# Patient Record
Sex: FEMALE | Race: WHITE | Employment: OTHER | URBAN - METROPOLITAN AREA
[De-identification: names, ages, dates, MRNs, and addresses within clinical notes are randomized per-mention and may not be internally consistent; named-entity substitution may affect disease eponyms.]

---

## 2023-06-01 ENCOUNTER — OFFICE VISIT (OUTPATIENT)
Age: 68
End: 2023-06-01
Payer: COMMERCIAL

## 2023-06-01 VITALS
TEMPERATURE: 97.1 F | HEART RATE: 71 BPM | OXYGEN SATURATION: 96 % | SYSTOLIC BLOOD PRESSURE: 130 MMHG | HEIGHT: 64 IN | WEIGHT: 191 LBS | DIASTOLIC BLOOD PRESSURE: 60 MMHG | RESPIRATION RATE: 14 BRPM | BODY MASS INDEX: 32.61 KG/M2

## 2023-06-01 DIAGNOSIS — Z86.010 HISTORY OF COLONIC POLYPS: ICD-10-CM

## 2023-06-01 DIAGNOSIS — R10.30 LOWER ABDOMINAL PAIN: ICD-10-CM

## 2023-06-01 DIAGNOSIS — D50.0 IRON DEFICIENCY ANEMIA DUE TO CHRONIC BLOOD LOSS: ICD-10-CM

## 2023-06-01 DIAGNOSIS — E07.9 THYROID DISORDER: ICD-10-CM

## 2023-06-01 DIAGNOSIS — Z86.010 HISTORY OF COLONIC POLYPS: Primary | ICD-10-CM

## 2023-06-01 DIAGNOSIS — K92.1 BLOOD IN STOOL: ICD-10-CM

## 2023-06-01 DIAGNOSIS — R10.30 LOWER ABDOMINAL PAIN: Primary | ICD-10-CM

## 2023-06-01 DIAGNOSIS — R19.7 DIARRHEA, UNSPECIFIED TYPE: ICD-10-CM

## 2023-06-01 PROBLEM — D64.9 ANEMIA: Status: ACTIVE | Noted: 2023-06-01

## 2023-06-01 PROBLEM — Z86.0100 HISTORY OF COLONIC POLYPS: Status: ACTIVE | Noted: 2023-03-08

## 2023-06-01 PROCEDURE — 99204 OFFICE O/P NEW MOD 45 MIN: CPT | Performed by: INTERNAL MEDICINE

## 2023-06-01 PROCEDURE — 1123F ACP DISCUSS/DSCN MKR DOCD: CPT | Performed by: INTERNAL MEDICINE

## 2023-06-01 RX ORDER — ASPIRIN 81 MG/1
81 TABLET ORAL DAILY
COMMUNITY

## 2023-06-01 RX ORDER — SERTRALINE HYDROCHLORIDE 100 MG/1
200 TABLET, FILM COATED ORAL EVERY MORNING
COMMUNITY
Start: 2023-04-03 | End: 2023-06-01

## 2023-06-01 RX ORDER — FUROSEMIDE 20 MG/1
20 TABLET ORAL 2 TIMES DAILY
COMMUNITY

## 2023-06-01 RX ORDER — POLYETHYLENE GLYCOL 3350 17 G/17G
POWDER, FOR SOLUTION ORAL
Qty: 510 G | Refills: 0 | Status: SHIPPED | OUTPATIENT
Start: 2023-06-01

## 2023-06-01 RX ORDER — ERGOCALCIFEROL 1.25 MG/1
CAPSULE ORAL
COMMUNITY
Start: 2023-04-13

## 2023-06-01 RX ORDER — LOPERAMIDE HCL 2 MG
2 TABLET ORAL PRN
COMMUNITY
Start: 2023-03-22

## 2023-06-01 RX ORDER — HYDROCODONE BITARTRATE AND ACETAMINOPHEN 10; 325 MG/1; MG/1
TABLET ORAL
COMMUNITY
Start: 2023-03-10 | End: 2023-06-01

## 2023-06-01 RX ORDER — LEVOTHYROXINE SODIUM 0.15 MG/1
150 TABLET ORAL
COMMUNITY
Start: 2023-04-03

## 2023-06-01 RX ORDER — DICYCLOMINE HYDROCHLORIDE 10 MG/1
10 CAPSULE ORAL 3 TIMES DAILY PRN
Qty: 90 CAPSULE | Refills: 3 | Status: SHIPPED | OUTPATIENT
Start: 2023-06-01

## 2023-06-01 RX ORDER — TRAZODONE HYDROCHLORIDE 100 MG/1
200 TABLET ORAL NIGHTLY
COMMUNITY
Start: 2023-05-15

## 2023-06-01 RX ORDER — BUSPIRONE HYDROCHLORIDE 15 MG/1
15 TABLET ORAL 2 TIMES DAILY
COMMUNITY
Start: 2023-05-22

## 2023-06-01 RX ORDER — ATORVASTATIN CALCIUM 40 MG/1
40 TABLET, FILM COATED ORAL DAILY
COMMUNITY

## 2023-06-01 RX ORDER — DONEPEZIL HYDROCHLORIDE 10 MG/1
TABLET, FILM COATED ORAL
COMMUNITY
Start: 2023-03-20

## 2023-06-01 RX ORDER — POTASSIUM CHLORIDE 750 MG/1
TABLET, FILM COATED, EXTENDED RELEASE ORAL
COMMUNITY
Start: 2023-05-31

## 2023-06-01 RX ORDER — MIRABEGRON 50 MG/1
TABLET, FILM COATED, EXTENDED RELEASE ORAL
COMMUNITY
Start: 2023-05-31

## 2023-06-01 RX ORDER — ATORVASTATIN CALCIUM 20 MG/1
20 TABLET, FILM COATED ORAL
COMMUNITY
End: 2023-06-01

## 2023-06-01 ASSESSMENT — PATIENT HEALTH QUESTIONNAIRE - PHQ9
1. LITTLE INTEREST OR PLEASURE IN DOING THINGS: 1
2. FEELING DOWN, DEPRESSED OR HOPELESS: 0
SUM OF ALL RESPONSES TO PHQ QUESTIONS 1-9: 1
SUM OF ALL RESPONSES TO PHQ QUESTIONS 1-9: 1
SUM OF ALL RESPONSES TO PHQ9 QUESTIONS 1 & 2: 1
SUM OF ALL RESPONSES TO PHQ QUESTIONS 1-9: 1
SUM OF ALL RESPONSES TO PHQ QUESTIONS 1-9: 1

## 2023-06-01 ASSESSMENT — ENCOUNTER SYMPTOMS
RECTAL PAIN: 0
CONSTIPATION: 0
ABDOMINAL DISTENTION: 1
ANAL BLEEDING: 1
VOMITING: 1
BLOOD IN STOOL: 1
DIARRHEA: 1
ABDOMINAL PAIN: 1
ALLERGIC/IMMUNOLOGIC NEGATIVE: 1
NAUSEA: 1

## 2023-06-02 PROBLEM — K92.1 BLOOD IN STOOL: Status: ACTIVE | Noted: 2023-06-02

## 2023-06-02 NOTE — PROGRESS NOTES
1. Have you been to the ER, urgent care clinic since your last visit? Hospitalized since your last visit? no    2. Have you seen or consulted any other health care providers outside of the [address] [place] [address] since your last visit? Include any pap smears or colon screening.  No    Chief Complaint   Patient presents with    New Patient    Melena     Black and tarry stools   /60 (Site: Left Upper Arm, Position: Sitting, Cuff Size: Large Adult)   Pulse 71   Temp 97.1 °F (36.2 °C) (Temporal)   Resp 14   Ht 5' 4\" (1.626 m)   Wt 191 lb (86.6 kg)   SpO2 96%   BMI 32.79 kg/m²
Patient and  was given [employer] stool kit to collect specimen at home. Colonoscopy is scheduled for 6-8-2023. NO PA REQUIRED PER [facility]. 6-2-2023.
back surgery    TONSILLECTOMY N/A        Family History   Problem Relation Age of Onset    Heart Disease Paternal Grandfather     Diabetes Other     Hypertension Other     Stroke Other        Social History     Socioeconomic History    Marital status:      Spouse name: Not on file    Number of children: Not on file    Years of education: Not on file    Highest education level: Not on file   Occupational History    Not on file   Tobacco Use    Smoking status: Every Day     Types: Cigarettes    Smokeless tobacco: Never   Vaping Use    Vaping Use: Never used   Substance and Sexual Activity    Alcohol use: Never    Drug use: Never    Sexual activity: Not on file   Other Topics Concern    Not on file   Social History Narrative    Not on file     Social Determinants of Health     Financial Resource Strain: Not on file   Food Insecurity: Not on file   Transportation Needs: Not on file   Physical Activity: Not on file   Stress: Not on file   Social Connections: Not on file   Intimate Partner Violence: Not on file   Housing Stability: Not on file         HPI  63-year-old female with history of hypertension, hyperlipidemia, anxiety disorder, legally blind, COPD, status post CVA, dementia, hypothyroidism, polysubstance abuse, history of suicide attempts, and depression who comes in for evaluation. Colonoscopy on 3/8/2023 at [address] [place] [address] for multiple polyps were noted throughout the colon which were not removed. The procedure was done by a surgeon there. The reason for the colonoscopy was anemia, diarrhea, and lower abdominal pain. Patient was sent to see GI later on 3/26/2023 who decided to hold off on a colonoscopy and removal polyps until her abdominal pain resolves. She was to have follow-up in 1 month. She states she is has constant diarrhea all day eating causes her to have a bowel movement. The abdominal pain is across the lower abdomen.   She has seen a urologist who did a cystoscopy and told her everything

## 2023-06-08 ENCOUNTER — HOSPITAL ENCOUNTER (OUTPATIENT)
Facility: HOSPITAL | Age: 68
Setting detail: OUTPATIENT SURGERY
Discharge: HOME | End: 2023-06-08
Attending: INTERNAL MEDICINE | Admitting: INTERNAL MEDICINE
Payer: COMMERCIAL

## 2023-06-08 ENCOUNTER — ANESTHESIA EVENT (OUTPATIENT)
Facility: HOSPITAL | Age: 68
End: 2023-06-08
Payer: COMMERCIAL

## 2023-06-08 ENCOUNTER — ANESTHESIA (OUTPATIENT)
Facility: HOSPITAL | Age: 68
End: 2023-06-08
Payer: COMMERCIAL

## 2023-06-08 ENCOUNTER — TELEPHONE (OUTPATIENT)
Age: 68
End: 2023-06-08

## 2023-06-08 VITALS
OXYGEN SATURATION: 94 % | HEIGHT: 64 IN | RESPIRATION RATE: 18 BRPM | TEMPERATURE: 97.3 F | WEIGHT: 181 LBS | DIASTOLIC BLOOD PRESSURE: 59 MMHG | SYSTOLIC BLOOD PRESSURE: 134 MMHG | HEART RATE: 75 BPM | BODY MASS INDEX: 30.9 KG/M2

## 2023-06-08 DIAGNOSIS — K57.90 DIVERTICULAR DISEASE: Primary | ICD-10-CM

## 2023-06-08 PROCEDURE — 3700000000 HC ANESTHESIA ATTENDED CARE: Performed by: INTERNAL MEDICINE

## 2023-06-08 PROCEDURE — 2709999900 HC NON-CHARGEABLE SUPPLY: Performed by: INTERNAL MEDICINE

## 2023-06-08 PROCEDURE — 6360000002 HC RX W HCPCS: Performed by: NURSE ANESTHETIST, CERTIFIED REGISTERED

## 2023-06-08 PROCEDURE — 3600007502: Performed by: INTERNAL MEDICINE

## 2023-06-08 PROCEDURE — 3600007512: Performed by: INTERNAL MEDICINE

## 2023-06-08 PROCEDURE — 3700000001 HC ADD 15 MINUTES (ANESTHESIA): Performed by: INTERNAL MEDICINE

## 2023-06-08 PROCEDURE — 7100000010 HC PHASE II RECOVERY - FIRST 15 MIN: Performed by: INTERNAL MEDICINE

## 2023-06-08 PROCEDURE — 7100000011 HC PHASE II RECOVERY - ADDTL 15 MIN: Performed by: INTERNAL MEDICINE

## 2023-06-08 PROCEDURE — 2580000003 HC RX 258: Performed by: INTERNAL MEDICINE

## 2023-06-08 RX ORDER — SODIUM CHLORIDE 9 MG/ML
25 INJECTION, SOLUTION INTRAVENOUS PRN
Status: DISCONTINUED | OUTPATIENT
Start: 2023-06-08 | End: 2023-06-08 | Stop reason: HOSPADM

## 2023-06-08 RX ORDER — FENTANYL CITRATE 50 UG/ML
INJECTION, SOLUTION INTRAMUSCULAR; INTRAVENOUS PRN
Status: DISCONTINUED | OUTPATIENT
Start: 2023-06-08 | End: 2023-06-08 | Stop reason: SDUPTHER

## 2023-06-08 RX ORDER — PROPOFOL 10 MG/ML
INJECTION, EMULSION INTRAVENOUS PRN
Status: DISCONTINUED | OUTPATIENT
Start: 2023-06-08 | End: 2023-06-08 | Stop reason: SDUPTHER

## 2023-06-08 RX ORDER — METRONIDAZOLE 500 MG/1
500 TABLET ORAL 3 TIMES DAILY
Qty: 42 TABLET | Refills: 0 | Status: SHIPPED | OUTPATIENT
Start: 2023-06-08

## 2023-06-08 RX ADMIN — PROPOFOL 50 MG: 10 INJECTION, EMULSION INTRAVENOUS at 14:24

## 2023-06-08 RX ADMIN — PROPOFOL 100 MG: 10 INJECTION, EMULSION INTRAVENOUS at 15:40

## 2023-06-08 RX ADMIN — PROPOFOL 75 MG: 10 INJECTION, EMULSION INTRAVENOUS at 14:38

## 2023-06-08 RX ADMIN — PROPOFOL 100 MG: 10 INJECTION, EMULSION INTRAVENOUS at 15:51

## 2023-06-08 RX ADMIN — PROPOFOL 50 MG: 10 INJECTION, EMULSION INTRAVENOUS at 16:23

## 2023-06-08 RX ADMIN — PROPOFOL 50 MG: 10 INJECTION, EMULSION INTRAVENOUS at 15:59

## 2023-06-08 RX ADMIN — PROPOFOL 50 MG: 10 INJECTION, EMULSION INTRAVENOUS at 16:10

## 2023-06-08 RX ADMIN — PROPOFOL 50 MG: 10 INJECTION, EMULSION INTRAVENOUS at 16:29

## 2023-06-08 RX ADMIN — PROPOFOL 50 MG: 10 INJECTION, EMULSION INTRAVENOUS at 15:33

## 2023-06-08 RX ADMIN — MIDAZOLAM 2 MG: 1 INJECTION INTRAMUSCULAR; INTRAVENOUS at 14:14

## 2023-06-08 RX ADMIN — PROPOFOL 50 MG: 10 INJECTION, EMULSION INTRAVENOUS at 14:15

## 2023-06-08 RX ADMIN — PROPOFOL 100 MG: 10 INJECTION, EMULSION INTRAVENOUS at 14:58

## 2023-06-08 RX ADMIN — PROPOFOL 100 MG: 10 INJECTION, EMULSION INTRAVENOUS at 15:45

## 2023-06-08 RX ADMIN — FENTANYL CITRATE 100 MCG: 50 INJECTION, SOLUTION INTRAMUSCULAR; INTRAVENOUS at 16:41

## 2023-06-08 RX ADMIN — PROPOFOL 50 MG: 10 INJECTION, EMULSION INTRAVENOUS at 15:25

## 2023-06-08 RX ADMIN — PROPOFOL 100 MG: 10 INJECTION, EMULSION INTRAVENOUS at 15:09

## 2023-06-08 RX ADMIN — PROPOFOL 50 MG: 10 INJECTION, EMULSION INTRAVENOUS at 14:32

## 2023-06-08 RX ADMIN — PROPOFOL 75 MG: 10 INJECTION, EMULSION INTRAVENOUS at 14:46

## 2023-06-08 ASSESSMENT — PAIN - FUNCTIONAL ASSESSMENT
PAIN_FUNCTIONAL_ASSESSMENT: NONE - DENIES PAIN
PAIN_FUNCTIONAL_ASSESSMENT: 0-10

## 2023-06-08 NOTE — OP NOTE
cm in length by 0.5 cm. It was removed via multiple cold biopsies. In the descending colon a polyp measuring 0 1.2 cm was removed via hot snare polypectomy. In the sigmoid colon polyp which measured 1.5 cm was found which could not be removed due to technical difficulties. In the rectum a polyp measuring 0.4 cm x 3 was removed via cold biopsies. Patient had diverticulosis involving the sigmoid and descending colon. It should be noted that the gum-like material was noted and small pieces throughout the colon because of biopsy as well as snares to get stuck in the scope after the initial use. Second colonoscope was used with all new forceps and it also stuck through the channel after initial use. We will have to discuss with patient since the sigmoid colon polyp could not be removed secondary to that problem. It did not prevent us from reaching the cecum.         ENDOSCOPIST: Jaylen Velasco MD     ENDOSCOPE: Olympus video colonoscope     ASSISTANT:Circulator: Yocasta Sierra RN              Scrub Person First: Mahsa Harden     ANESTHESIA: TIVA      QUALITY OF PREPARATION:Poor      FINDINGS:   Ascending colon polyps  Transverse colon polyp  Sigmoid colon polyp  Rectal polyps  Left-sided diverticulosis      Complication:  None           EBL: Minimal     SPECIMENS:   ID Type Source Tests Collected by Time Destination   A : ascending colon polyp Tissue Colon-Ascending SURGICAL PATHOLOGY [place]., MD 6/8/2023 1448    B : descending colon polyp Tissue Colon-Descending SURGICAL PATHOLOGY [place] [facility]., MD 6/8/2023 1509    C : rectal polyp Tissue Rectum SURGICAL PATHOLOGY [place]., MD 6/8/2023 1519              [place], MD  June 8, 2023  4:43 PM

## 2023-06-08 NOTE — INTERVAL H&P NOTE
Update History & Physical    The patient's History and Physical of June 8, 2023 was reviewed with the patient and I examined the patient. There was no change. The surgical site was confirmed by the patient and me. Plan: The risks, benefits, expected outcome, and alternative to the recommended procedure have been discussed with the patient. Patient understands and wants to proceed with the procedure.      Electronically signed by Carmen Birmingham MD on 6/8/2023 at 10:34 AM

## 2023-06-08 NOTE — ANESTHESIA PRE PROCEDURE
Department of Anesthesiology  Preprocedure Note       Name:  Venkat Polo   Age:  79 y.o.  :  1955                                          MRN:  [id number]         Date:  2023      Surgeon: Luis Miguel Carrasquillo):  Wing Arabella MD    Procedure: Procedure(s):  COLONOSCOPY    Medications prior to admission:   Prior to Admission medications    Medication Sig Start Date End Date Taking? Authorizing Provider   busPIRone (BUSPAR) 15 MG tablet Take 15 mg by mouth 2 times daily 23   Historical Provider, MD   diclofenac sodium (VOLTAREN) 1 % GEL 2 g 4 times daily as needed 23   Historical Provider, MD   donepezil (ARICEPT) 10 MG tablet TAKE 1 TABLET BY MOUTH EVERYDAY AT BEDTIME 3/20/23   Historical Provider, MD   vitamin D (ERGOCALCIFEROL) 1.25 MG (60621 UT) CAPS capsule TAKE 1 CAPSULE BY MOUTH 1 TIME PER WEEK. 23   Historical Provider, MD   levothyroxine (SYNTHROID) 150 MCG tablet Take 1 tablet by mouth every morning (before breakfast) 4/3/23   Historical Provider, MD   [employer] ANTI-DIARRHEAL 2 MG tablet 1 tablet as needed 3/22/23   Historical Provider, MD   metoprolol tartrate (LOPRESSOR) 25 MG tablet Take 1 tablet by mouth 2 times daily 23   Historical Provider, MD   MYRBETRIQ 50 MG TB24  23   Historical Provider, MD   potassium chloride (KLOR-CON) 10 MEQ extended release tablet  23   Historical Provider, MD   traZODone (DESYREL) 100 MG tablet Take 2 tablets by mouth nightly 5/15/23   Historical Provider, MD   aspirin 81 MG EC tablet Take 1 tablet by mouth daily    Historical Provider, MD   furosemide (LASIX) 20 MG tablet Take 1 tablet by mouth 2 times daily  Patient not taking: Reported on 2023    Historical Provider, MD   atorvastatin (LIPITOR) 40 MG tablet Take 1 tablet by mouth daily    Historical Provider, MD   polyethylene glycol (GLYCOLAX) 17 GM/SCOOP powder Use as directed by physician.  23   Jh Rico MD   dicyclomine (BENTYL) 10 MG capsule Take 1

## 2023-06-08 NOTE — ANESTHESIA PRE PROCEDURE
Department of Anesthesiology  Preprocedure Note       Name:  Lilian Burn   Age:  79 y.o.  :  1955                                          MRN:  [id number]         Date:  2023      Surgeon: Robinson Nguyen):  Danelle Mesa MD    Procedure: Procedure(s):  COLONOSCOPY    Medications prior to admission:   Prior to Admission medications    Medication Sig Start Date End Date Taking? Authorizing Provider   busPIRone (BUSPAR) 15 MG tablet Take 15 mg by mouth 2 times daily 23   Historical Provider, MD   diclofenac sodium (VOLTAREN) 1 % GEL 2 g 4 times daily as needed 23   Historical Provider, MD   donepezil (ARICEPT) 10 MG tablet TAKE 1 TABLET BY MOUTH EVERYDAY AT BEDTIME 3/20/23   Historical Provider, MD   vitamin D (ERGOCALCIFEROL) 1.25 MG (82365 UT) CAPS capsule TAKE 1 CAPSULE BY MOUTH 1 TIME PER WEEK. 23   Historical Provider, MD   levothyroxine (SYNTHROID) 150 MCG tablet Take 1 tablet by mouth every morning (before breakfast) 4/3/23   Historical Provider, MD   [employer]S ANTI-DIARRHEAL 2 MG tablet 1 tablet as needed 3/22/23   Historical Provider, MD   metoprolol tartrate (LOPRESSOR) 25 MG tablet Take 1 tablet by mouth 2 times daily 23   Historical Provider, MD   MYRBETRIQ 50 MG TB24  23   Historical Provider, MD   potassium chloride (KLOR-CON) 10 MEQ extended release tablet  23   Historical Provider, MD   traZODone (DESYREL) 100 MG tablet Take 2 tablets by mouth nightly 5/15/23   Historical Provider, MD   aspirin 81 MG EC tablet Take 1 tablet by mouth daily    Historical Provider, MD   furosemide (LASIX) 20 MG tablet Take 1 tablet by mouth 2 times daily  Patient not taking: Reported on 2023    Historical Provider, MD   atorvastatin (LIPITOR) 40 MG tablet Take 1 tablet by mouth daily    Historical Provider, MD   polyethylene glycol (GLYCOLAX) 17 GM/SCOOP powder Use as directed by physician.  23   Jh Hurt MD   dicyclomine (BENTYL) 10 MG capsule Take 1

## 2023-06-08 NOTE — ANESTHESIA POSTPROCEDURE EVALUATION
Department of Anesthesiology  Postprocedure Note    Patient: Cristine Ortiz  MRN: [id number]  YOB: 1955  Date of evaluation: 6/8/2023      Procedure Summary     Date: 06/08/23 Room / Location: [facility] ENDO 01 / [facility] ENDOSCOPY    Anesthesia Start: 1414 Anesthesia Stop:     Procedure: COLONOSCOPY (Anus) Diagnosis:       Hx of colonic polyps      Lower abdominal pain      Diarrhea, unspecified type      (Hx of colonic polyps [Z86.010])      (Lower abdominal pain [R10.30])      (Diarrhea, unspecified type [R19.7])    Surgeons:  Rancho Yepez MD Responsible Provider: JILLIAN Brizuela CRNA    Anesthesia Type: MAC ASA Status: 3          Anesthesia Type: MAC    Sowmya Phase I: Sowmya Score: 10    Sowmya Phase II:        Anesthesia Post Evaluation    Patient location during evaluation: PACU  Patient participation: complete - patient participated  Level of consciousness: awake  Pain score: 7  Airway patency: patent  Nausea & Vomiting: no vomiting and no nausea  Complications: no  Cardiovascular status: blood pressure returned to baseline and hemodynamically stable  Respiratory status: room air  Hydration status: stable  Multimodal analgesia pain management approach

## 2023-06-08 NOTE — DISCHARGE INSTRUCTIONS

## 2023-06-09 ENCOUNTER — TELEPHONE (OUTPATIENT)
Age: 68
End: 2023-06-09

## 2023-06-09 RX ORDER — MIDAZOLAM HYDROCHLORIDE 1 MG/ML
INJECTION INTRAMUSCULAR; INTRAVENOUS PRN
Status: DISCONTINUED | OUTPATIENT
Start: 2023-06-08 | End: 2023-06-09 | Stop reason: SDUPTHER

## 2023-06-09 NOTE — ADDENDUM NOTE
Addendum  created 06/09/23 1142 by JILLIAN Lacy - DAVID    Intraprocedure Meds edited, Orders acknowledged in Narrator

## 2023-06-09 NOTE — RESULT ENCOUNTER NOTE
Tell patient that the stool sample submitted did not show any infection in her colon. Continue the antibiotic therapy that was prescribed after the colonoscopy.

## 2024-03-17 ENCOUNTER — ANESTHESIA (OUTPATIENT)
Facility: HOSPITAL | Age: 69
End: 2024-03-17
Payer: COMMERCIAL

## 2024-03-17 ENCOUNTER — APPOINTMENT (OUTPATIENT)
Facility: HOSPITAL | Age: 69
End: 2024-03-17
Payer: MEDICARE

## 2024-03-17 ENCOUNTER — HOSPITAL ENCOUNTER (INPATIENT)
Facility: HOSPITAL | Age: 69
LOS: 4 days | Discharge: NURSING, REHAB OR LONG-TERM CARE | DRG: 661 | End: 2024-03-21
Attending: STUDENT IN AN ORGANIZED HEALTH CARE EDUCATION/TRAINING PROGRAM | Admitting: STUDENT IN AN ORGANIZED HEALTH CARE EDUCATION/TRAINING PROGRAM
Payer: MEDICARE

## 2024-03-17 ENCOUNTER — ANESTHESIA EVENT (OUTPATIENT)
Facility: HOSPITAL | Age: 69
End: 2024-03-17
Payer: COMMERCIAL

## 2024-03-17 ENCOUNTER — HOSPITAL ENCOUNTER (EMERGENCY)
Facility: HOSPITAL | Age: 69
Discharge: ANOTHER HOSPITAL | End: 2024-03-17
Attending: STUDENT IN AN ORGANIZED HEALTH CARE EDUCATION/TRAINING PROGRAM
Payer: MEDICARE

## 2024-03-17 ENCOUNTER — APPOINTMENT (OUTPATIENT)
Facility: HOSPITAL | Age: 69
DRG: 661 | End: 2024-03-17
Payer: MEDICARE

## 2024-03-17 VITALS
SYSTOLIC BLOOD PRESSURE: 190 MMHG | HEIGHT: 64 IN | DIASTOLIC BLOOD PRESSURE: 88 MMHG | OXYGEN SATURATION: 92 % | TEMPERATURE: 98.3 F | WEIGHT: 196.2 LBS | BODY MASS INDEX: 33.49 KG/M2 | RESPIRATION RATE: 18 BRPM | HEART RATE: 73 BPM

## 2024-03-17 DIAGNOSIS — N20.0 NEPHROLITHIASIS: Primary | ICD-10-CM

## 2024-03-17 DIAGNOSIS — N20.1 URETEROLITHIASIS: Primary | ICD-10-CM

## 2024-03-17 LAB
ABO + RH BLD: NORMAL
ALBUMIN SERPL-MCNC: 3.1 G/DL (ref 3.5–5)
ALBUMIN/GLOB SERPL: 1 (ref 1.1–2.2)
ALP SERPL-CCNC: 79 U/L (ref 45–117)
ALT SERPL-CCNC: 11 U/L (ref 12–78)
ANION GAP SERPL CALC-SCNC: 10 MMOL/L (ref 5–15)
APPEARANCE UR: CLEAR
AST SERPL W P-5'-P-CCNC: 17 U/L (ref 15–37)
BASOPHILS # BLD: 0 K/UL (ref 0–0.1)
BASOPHILS NFR BLD: 0 % (ref 0–1)
BILIRUB SERPL-MCNC: 0.4 MG/DL (ref 0.2–1)
BILIRUB UR QL: NEGATIVE
BLOOD GROUP ANTIBODIES SERPL: NEGATIVE
BUN SERPL-MCNC: 16 MG/DL (ref 6–20)
BUN/CREAT SERPL: 10 (ref 12–20)
CA-I BLD-MCNC: 8.4 MG/DL (ref 8.5–10.1)
CHLORIDE SERPL-SCNC: 106 MMOL/L (ref 97–108)
CO2 SERPL-SCNC: 26 MMOL/L (ref 21–32)
COLOR UR: ABNORMAL
CREAT SERPL-MCNC: 1.64 MG/DL (ref 0.55–1.02)
DIFFERENTIAL METHOD BLD: ABNORMAL
EOSINOPHIL # BLD: 0.1 K/UL (ref 0–0.4)
EOSINOPHIL NFR BLD: 2 % (ref 0–7)
ERYTHROCYTE [DISTWIDTH] IN BLOOD BY AUTOMATED COUNT: 21.5 % (ref 11.5–14.5)
GLOBULIN SER CALC-MCNC: 3.1 G/DL (ref 2–4)
GLUCOSE SERPL-MCNC: 129 MG/DL (ref 65–100)
GLUCOSE UR STRIP.AUTO-MCNC: NEGATIVE MG/DL
HCT VFR BLD AUTO: 26.9 % (ref 35–47)
HGB BLD-MCNC: 7.8 G/DL (ref 11.5–16)
HGB UR QL STRIP: ABNORMAL
IMM GRANULOCYTES # BLD AUTO: 0 K/UL (ref 0–0.04)
IMM GRANULOCYTES NFR BLD AUTO: 0 % (ref 0–0.5)
KETONES UR QL STRIP.AUTO: NEGATIVE MG/DL
LEUKOCYTE ESTERASE UR QL STRIP.AUTO: NEGATIVE
LIPASE SERPL-CCNC: 53 U/L (ref 13–75)
LYMPHOCYTES # BLD: 1.1 K/UL (ref 0.8–3.5)
LYMPHOCYTES NFR BLD: 20 % (ref 12–49)
MAGNESIUM SERPL-MCNC: 1.7 MG/DL (ref 1.6–2.4)
MCH RBC QN AUTO: 20.6 PG (ref 26–34)
MCHC RBC AUTO-ENTMCNC: 29 G/DL (ref 30–36.5)
MCV RBC AUTO: 71 FL (ref 80–99)
MONOCYTES # BLD: 0.5 K/UL (ref 0–1)
MONOCYTES NFR BLD: 9 % (ref 5–13)
NEUTS SEG # BLD: 4 K/UL (ref 1.8–8)
NEUTS SEG NFR BLD: 69 % (ref 32–75)
NITRITE UR QL STRIP.AUTO: NEGATIVE
NRBC # BLD: 0 K/UL (ref 0–0.01)
NRBC BLD-RTO: 0 PER 100 WBC
PH UR STRIP: 7 (ref 5–8)
PLATELET # BLD AUTO: 166 K/UL (ref 150–400)
PMV BLD AUTO: 9.5 FL (ref 8.9–12.9)
POTASSIUM SERPL-SCNC: 3.7 MMOL/L (ref 3.5–5.1)
PROT SERPL-MCNC: 6.2 G/DL (ref 6.4–8.2)
PROT UR STRIP-MCNC: NEGATIVE MG/DL
RBC # BLD AUTO: 3.79 M/UL (ref 3.8–5.2)
RBC MORPH BLD: ABNORMAL
SODIUM SERPL-SCNC: 142 MMOL/L (ref 136–145)
SP GR UR REFRACTOMETRY: 1.01 (ref 1–1.03)
SPECIMEN EXP DATE BLD: NORMAL
TROPONIN I SERPL HS-MCNC: 11 NG/L (ref 0–51)
TROPONIN I SERPL HS-MCNC: 12 NG/L (ref 0–51)
UROBILINOGEN UR QL STRIP.AUTO: 1 EU/DL (ref 0.2–1)
WBC # BLD AUTO: 5.7 K/UL (ref 3.6–11)

## 2024-03-17 PROCEDURE — 7100000001 HC PACU RECOVERY - ADDTL 15 MIN: Performed by: UROLOGY

## 2024-03-17 PROCEDURE — 83735 ASSAY OF MAGNESIUM: CPT

## 2024-03-17 PROCEDURE — 36415 COLL VENOUS BLD VENIPUNCTURE: CPT

## 2024-03-17 PROCEDURE — 3600000003 HC SURGERY LEVEL 3 BASE: Performed by: UROLOGY

## 2024-03-17 PROCEDURE — 86900 BLOOD TYPING SEROLOGIC ABO: CPT

## 2024-03-17 PROCEDURE — 96375 TX/PRO/DX INJ NEW DRUG ADDON: CPT

## 2024-03-17 PROCEDURE — 0T768DZ DILATION OF RIGHT URETER WITH INTRALUMINAL DEVICE, VIA NATURAL OR ARTIFICIAL OPENING ENDOSCOPIC: ICD-10-PCS | Performed by: UROLOGY

## 2024-03-17 PROCEDURE — 76000 FLUOROSCOPY <1 HR PHYS/QHP: CPT

## 2024-03-17 PROCEDURE — 74176 CT ABD & PELVIS W/O CONTRAST: CPT

## 2024-03-17 PROCEDURE — 6370000000 HC RX 637 (ALT 250 FOR IP): Performed by: PHYSICIAN ASSISTANT

## 2024-03-17 PROCEDURE — 84484 ASSAY OF TROPONIN QUANT: CPT

## 2024-03-17 PROCEDURE — 52332 CYSTOSCOPY AND TREATMENT: CPT | Performed by: UROLOGY

## 2024-03-17 PROCEDURE — 85025 COMPLETE CBC W/AUTO DIFF WBC: CPT

## 2024-03-17 PROCEDURE — 6360000002 HC RX W HCPCS: Performed by: STUDENT IN AN ORGANIZED HEALTH CARE EDUCATION/TRAINING PROGRAM

## 2024-03-17 PROCEDURE — 99223 1ST HOSP IP/OBS HIGH 75: CPT | Performed by: UROLOGY

## 2024-03-17 PROCEDURE — C1769 GUIDE WIRE: HCPCS | Performed by: UROLOGY

## 2024-03-17 PROCEDURE — 2500000003 HC RX 250 WO HCPCS: Performed by: NURSE ANESTHETIST, CERTIFIED REGISTERED

## 2024-03-17 PROCEDURE — 2580000003 HC RX 258: Performed by: STUDENT IN AN ORGANIZED HEALTH CARE EDUCATION/TRAINING PROGRAM

## 2024-03-17 PROCEDURE — 2500000003 HC RX 250 WO HCPCS: Performed by: PHYSICIAN ASSISTANT

## 2024-03-17 PROCEDURE — 86850 RBC ANTIBODY SCREEN: CPT

## 2024-03-17 PROCEDURE — 96374 THER/PROPH/DIAG INJ IV PUSH: CPT

## 2024-03-17 PROCEDURE — 1100000000 HC RM PRIVATE

## 2024-03-17 PROCEDURE — 2580000003 HC RX 258: Performed by: NURSE ANESTHETIST, CERTIFIED REGISTERED

## 2024-03-17 PROCEDURE — 3600000013 HC SURGERY LEVEL 3 ADDTL 15MIN: Performed by: UROLOGY

## 2024-03-17 PROCEDURE — 83540 ASSAY OF IRON: CPT

## 2024-03-17 PROCEDURE — 3700000001 HC ADD 15 MINUTES (ANESTHESIA): Performed by: UROLOGY

## 2024-03-17 PROCEDURE — 2709999900 HC NON-CHARGEABLE SUPPLY: Performed by: UROLOGY

## 2024-03-17 PROCEDURE — 7100000000 HC PACU RECOVERY - FIRST 15 MIN: Performed by: UROLOGY

## 2024-03-17 PROCEDURE — 99285 EMERGENCY DEPT VISIT HI MDM: CPT

## 2024-03-17 PROCEDURE — 96361 HYDRATE IV INFUSION ADD-ON: CPT

## 2024-03-17 PROCEDURE — 93005 ELECTROCARDIOGRAM TRACING: CPT | Performed by: STUDENT IN AN ORGANIZED HEALTH CARE EDUCATION/TRAINING PROGRAM

## 2024-03-17 PROCEDURE — 3700000000 HC ANESTHESIA ATTENDED CARE: Performed by: UROLOGY

## 2024-03-17 PROCEDURE — 81001 URINALYSIS AUTO W/SCOPE: CPT

## 2024-03-17 PROCEDURE — C2617 STENT, NON-COR, TEM W/O DEL: HCPCS | Performed by: UROLOGY

## 2024-03-17 PROCEDURE — 83690 ASSAY OF LIPASE: CPT

## 2024-03-17 PROCEDURE — 6360000002 HC RX W HCPCS: Performed by: PHYSICIAN ASSISTANT

## 2024-03-17 PROCEDURE — 6370000000 HC RX 637 (ALT 250 FOR IP): Performed by: UROLOGY

## 2024-03-17 PROCEDURE — 80053 COMPREHEN METABOLIC PANEL: CPT

## 2024-03-17 PROCEDURE — 86901 BLOOD TYPING SEROLOGIC RH(D): CPT

## 2024-03-17 PROCEDURE — 6370000000 HC RX 637 (ALT 250 FOR IP): Performed by: STUDENT IN AN ORGANIZED HEALTH CARE EDUCATION/TRAINING PROGRAM

## 2024-03-17 PROCEDURE — 6360000002 HC RX W HCPCS: Performed by: NURSE ANESTHETIST, CERTIFIED REGISTERED

## 2024-03-17 PROCEDURE — 2580000003 HC RX 258: Performed by: PHYSICIAN ASSISTANT

## 2024-03-17 DEVICE — VARIABLE LENGTH URETERAL STENT
Type: IMPLANTABLE DEVICE | Site: URETER | Status: FUNCTIONAL
Brand: CONTOUR VL™

## 2024-03-17 RX ORDER — ONDANSETRON 4 MG/1
4 TABLET, ORALLY DISINTEGRATING ORAL EVERY 8 HOURS PRN
Status: DISCONTINUED | OUTPATIENT
Start: 2024-03-17 | End: 2024-03-21 | Stop reason: HOSPADM

## 2024-03-17 RX ORDER — DEXAMETHASONE SODIUM PHOSPHATE 4 MG/ML
INJECTION, SOLUTION INTRA-ARTICULAR; INTRALESIONAL; INTRAMUSCULAR; INTRAVENOUS; SOFT TISSUE PRN
Status: DISCONTINUED | OUTPATIENT
Start: 2024-03-17 | End: 2024-03-17 | Stop reason: SDUPTHER

## 2024-03-17 RX ORDER — 0.9 % SODIUM CHLORIDE 0.9 %
1000 INTRAVENOUS SOLUTION INTRAVENOUS ONCE
Status: COMPLETED | OUTPATIENT
Start: 2024-03-17 | End: 2024-03-17

## 2024-03-17 RX ORDER — LIDOCAINE HYDROCHLORIDE 20 MG/ML
INJECTION, SOLUTION EPIDURAL; INFILTRATION; INTRACAUDAL; PERINEURAL PRN
Status: DISCONTINUED | OUTPATIENT
Start: 2024-03-17 | End: 2024-03-17 | Stop reason: SDUPTHER

## 2024-03-17 RX ORDER — POTASSIUM CHLORIDE 20 MEQ/1
40 TABLET, EXTENDED RELEASE ORAL PRN
Status: DISCONTINUED | OUTPATIENT
Start: 2024-03-17 | End: 2024-03-21 | Stop reason: HOSPADM

## 2024-03-17 RX ORDER — MORPHINE SULFATE 4 MG/ML
4 INJECTION, SOLUTION INTRAMUSCULAR; INTRAVENOUS
Status: COMPLETED | OUTPATIENT
Start: 2024-03-17 | End: 2024-03-17

## 2024-03-17 RX ORDER — LORAZEPAM 2 MG/ML
0.5 INJECTION INTRAMUSCULAR
Status: DISCONTINUED | OUTPATIENT
Start: 2024-03-17 | End: 2024-03-17 | Stop reason: HOSPADM

## 2024-03-17 RX ORDER — SODIUM CHLORIDE 9 MG/ML
INJECTION, SOLUTION INTRAVENOUS CONTINUOUS
Status: DISCONTINUED | OUTPATIENT
Start: 2024-03-17 | End: 2024-03-17 | Stop reason: HOSPADM

## 2024-03-17 RX ORDER — ONDANSETRON 2 MG/ML
4 INJECTION INTRAMUSCULAR; INTRAVENOUS
Status: DISCONTINUED | OUTPATIENT
Start: 2024-03-17 | End: 2024-03-17 | Stop reason: HOSPADM

## 2024-03-17 RX ORDER — FENTANYL CITRATE 50 UG/ML
50 INJECTION, SOLUTION INTRAMUSCULAR; INTRAVENOUS EVERY 5 MIN PRN
Status: DISCONTINUED | OUTPATIENT
Start: 2024-03-17 | End: 2024-03-17 | Stop reason: HOSPADM

## 2024-03-17 RX ORDER — SODIUM CHLORIDE 0.9 % (FLUSH) 0.9 %
5-40 SYRINGE (ML) INJECTION PRN
Status: DISCONTINUED | OUTPATIENT
Start: 2024-03-17 | End: 2024-03-21 | Stop reason: HOSPADM

## 2024-03-17 RX ORDER — MORPHINE SULFATE 4 MG/ML
4 INJECTION, SOLUTION INTRAMUSCULAR; INTRAVENOUS ONCE
Status: COMPLETED | OUTPATIENT
Start: 2024-03-17 | End: 2024-03-17

## 2024-03-17 RX ORDER — LIDOCAINE 4 G/G
1 PATCH TOPICAL AS NEEDED
Status: DISCONTINUED | OUTPATIENT
Start: 2024-03-17 | End: 2024-03-17 | Stop reason: HOSPADM

## 2024-03-17 RX ORDER — SODIUM CHLORIDE 0.9 % (FLUSH) 0.9 %
5-40 SYRINGE (ML) INJECTION PRN
Status: DISCONTINUED | OUTPATIENT
Start: 2024-03-17 | End: 2024-03-17 | Stop reason: HOSPADM

## 2024-03-17 RX ORDER — EPHEDRINE SULFATE 50 MG/ML
INJECTION INTRAVENOUS PRN
Status: DISCONTINUED | OUTPATIENT
Start: 2024-03-17 | End: 2024-03-17 | Stop reason: SDUPTHER

## 2024-03-17 RX ORDER — HYDROMORPHONE HYDROCHLORIDE 1 MG/ML
0.5 INJECTION, SOLUTION INTRAMUSCULAR; INTRAVENOUS; SUBCUTANEOUS EVERY 4 HOURS PRN
Status: DISPENSED | OUTPATIENT
Start: 2024-03-17 | End: 2024-03-19

## 2024-03-17 RX ORDER — LIDOCAINE HYDROCHLORIDE 20 MG/ML
JELLY TOPICAL PRN
Status: DISCONTINUED | OUTPATIENT
Start: 2024-03-17 | End: 2024-03-17 | Stop reason: ALTCHOICE

## 2024-03-17 RX ORDER — LEVOTHYROXINE SODIUM 0.03 MG/1
125 TABLET ORAL
Status: DISCONTINUED | OUTPATIENT
Start: 2024-03-17 | End: 2024-03-21 | Stop reason: HOSPADM

## 2024-03-17 RX ORDER — FENTANYL CITRATE 50 UG/ML
INJECTION, SOLUTION INTRAMUSCULAR; INTRAVENOUS PRN
Status: DISCONTINUED | OUTPATIENT
Start: 2024-03-17 | End: 2024-03-17 | Stop reason: SDUPTHER

## 2024-03-17 RX ORDER — SODIUM CHLORIDE 0.9 % (FLUSH) 0.9 %
5-40 SYRINGE (ML) INJECTION EVERY 12 HOURS SCHEDULED
Status: DISCONTINUED | OUTPATIENT
Start: 2024-03-17 | End: 2024-03-17 | Stop reason: HOSPADM

## 2024-03-17 RX ORDER — QUETIAPINE FUMARATE 100 MG/1
100 TABLET, FILM COATED ORAL NIGHTLY
COMMUNITY

## 2024-03-17 RX ORDER — TAMSULOSIN HYDROCHLORIDE 0.4 MG/1
0.4 CAPSULE ORAL ONCE
Status: COMPLETED | OUTPATIENT
Start: 2024-03-17 | End: 2024-03-17

## 2024-03-17 RX ORDER — ATORVASTATIN CALCIUM 40 MG/1
40 TABLET, FILM COATED ORAL DAILY
Status: DISCONTINUED | OUTPATIENT
Start: 2024-03-17 | End: 2024-03-21 | Stop reason: HOSPADM

## 2024-03-17 RX ORDER — TRAZODONE HYDROCHLORIDE 50 MG/1
100 TABLET ORAL DAILY PRN
Status: DISCONTINUED | OUTPATIENT
Start: 2024-03-17 | End: 2024-03-21 | Stop reason: HOSPADM

## 2024-03-17 RX ORDER — KETOROLAC TROMETHAMINE 30 MG/ML
INJECTION, SOLUTION INTRAMUSCULAR; INTRAVENOUS PRN
Status: DISCONTINUED | OUTPATIENT
Start: 2024-03-17 | End: 2024-03-17 | Stop reason: SDUPTHER

## 2024-03-17 RX ORDER — IPRATROPIUM BROMIDE AND ALBUTEROL SULFATE 2.5; .5 MG/3ML; MG/3ML
1 SOLUTION RESPIRATORY (INHALATION)
Status: DISCONTINUED | OUTPATIENT
Start: 2024-03-17 | End: 2024-03-17 | Stop reason: HOSPADM

## 2024-03-17 RX ORDER — POLYETHYLENE GLYCOL 3350 17 G/17G
17 POWDER, FOR SOLUTION ORAL DAILY PRN
Status: DISCONTINUED | OUTPATIENT
Start: 2024-03-17 | End: 2024-03-21 | Stop reason: HOSPADM

## 2024-03-17 RX ORDER — SERTRALINE HYDROCHLORIDE 100 MG/1
200 TABLET, FILM COATED ORAL DAILY
COMMUNITY

## 2024-03-17 RX ORDER — OXYCODONE HYDROCHLORIDE 5 MG/1
5 TABLET ORAL PRN
Status: DISCONTINUED | OUTPATIENT
Start: 2024-03-17 | End: 2024-03-17 | Stop reason: HOSPADM

## 2024-03-17 RX ORDER — HYDROMORPHONE HYDROCHLORIDE 1 MG/ML
0.5 INJECTION, SOLUTION INTRAMUSCULAR; INTRAVENOUS; SUBCUTANEOUS EVERY 5 MIN PRN
Status: DISCONTINUED | OUTPATIENT
Start: 2024-03-17 | End: 2024-03-17 | Stop reason: HOSPADM

## 2024-03-17 RX ORDER — SODIUM CHLORIDE 0.9 % (FLUSH) 0.9 %
5-40 SYRINGE (ML) INJECTION EVERY 12 HOURS SCHEDULED
Status: DISCONTINUED | OUTPATIENT
Start: 2024-03-17 | End: 2024-03-21 | Stop reason: HOSPADM

## 2024-03-17 RX ORDER — GLUCAGON 1 MG/ML
1 KIT INJECTION PRN
Status: DISCONTINUED | OUTPATIENT
Start: 2024-03-17 | End: 2024-03-17 | Stop reason: HOSPADM

## 2024-03-17 RX ORDER — ACETAMINOPHEN 325 MG/1
650 TABLET ORAL EVERY 6 HOURS PRN
Status: DISCONTINUED | OUTPATIENT
Start: 2024-03-17 | End: 2024-03-21 | Stop reason: HOSPADM

## 2024-03-17 RX ORDER — ONDANSETRON 2 MG/ML
4 INJECTION INTRAMUSCULAR; INTRAVENOUS EVERY 6 HOURS PRN
Status: DISCONTINUED | OUTPATIENT
Start: 2024-03-17 | End: 2024-03-21 | Stop reason: HOSPADM

## 2024-03-17 RX ORDER — 0.9 % SODIUM CHLORIDE 0.9 %
INTRAVENOUS SOLUTION INTRAVENOUS PRN
Status: DISCONTINUED | OUTPATIENT
Start: 2024-03-17 | End: 2024-03-17 | Stop reason: SDUPTHER

## 2024-03-17 RX ORDER — POTASSIUM CHLORIDE 7.45 MG/ML
10 INJECTION INTRAVENOUS PRN
Status: DISCONTINUED | OUTPATIENT
Start: 2024-03-17 | End: 2024-03-21 | Stop reason: HOSPADM

## 2024-03-17 RX ORDER — SODIUM CHLORIDE 9 MG/ML
INJECTION, SOLUTION INTRAVENOUS CONTINUOUS
Status: DISPENSED | OUTPATIENT
Start: 2024-03-17 | End: 2024-03-19

## 2024-03-17 RX ORDER — PROPOFOL 10 MG/ML
INJECTION, EMULSION INTRAVENOUS PRN
Status: DISCONTINUED | OUTPATIENT
Start: 2024-03-17 | End: 2024-03-17 | Stop reason: SDUPTHER

## 2024-03-17 RX ORDER — OXYCODONE HYDROCHLORIDE 5 MG/1
10 TABLET ORAL PRN
Status: DISCONTINUED | OUTPATIENT
Start: 2024-03-17 | End: 2024-03-17 | Stop reason: HOSPADM

## 2024-03-17 RX ORDER — DEXTROSE MONOHYDRATE 100 MG/ML
INJECTION, SOLUTION INTRAVENOUS CONTINUOUS PRN
Status: DISCONTINUED | OUTPATIENT
Start: 2024-03-17 | End: 2024-03-17 | Stop reason: HOSPADM

## 2024-03-17 RX ORDER — MAGNESIUM SULFATE IN WATER 40 MG/ML
2000 INJECTION, SOLUTION INTRAVENOUS PRN
Status: DISCONTINUED | OUTPATIENT
Start: 2024-03-17 | End: 2024-03-21 | Stop reason: HOSPADM

## 2024-03-17 RX ORDER — NALOXONE HYDROCHLORIDE 0.4 MG/ML
INJECTION, SOLUTION INTRAMUSCULAR; INTRAVENOUS; SUBCUTANEOUS PRN
Status: DISCONTINUED | OUTPATIENT
Start: 2024-03-17 | End: 2024-03-17 | Stop reason: HOSPADM

## 2024-03-17 RX ORDER — SODIUM CHLORIDE, SODIUM LACTATE, POTASSIUM CHLORIDE, CALCIUM CHLORIDE 600; 310; 30; 20 MG/100ML; MG/100ML; MG/100ML; MG/100ML
INJECTION, SOLUTION INTRAVENOUS ONCE
Status: DISCONTINUED | OUTPATIENT
Start: 2024-03-17 | End: 2024-03-17 | Stop reason: HOSPADM

## 2024-03-17 RX ORDER — DIPHENHYDRAMINE HYDROCHLORIDE 50 MG/ML
12.5 INJECTION INTRAMUSCULAR; INTRAVENOUS
Status: DISCONTINUED | OUTPATIENT
Start: 2024-03-17 | End: 2024-03-17 | Stop reason: HOSPADM

## 2024-03-17 RX ORDER — ACETAMINOPHEN 650 MG/1
650 SUPPOSITORY RECTAL EVERY 6 HOURS PRN
Status: DISCONTINUED | OUTPATIENT
Start: 2024-03-17 | End: 2024-03-21 | Stop reason: HOSPADM

## 2024-03-17 RX ORDER — ONDANSETRON 2 MG/ML
2 INJECTION INTRAMUSCULAR; INTRAVENOUS ONCE
Status: COMPLETED | OUTPATIENT
Start: 2024-03-17 | End: 2024-03-17

## 2024-03-17 RX ORDER — DONEPEZIL HYDROCHLORIDE 5 MG/1
10 TABLET, FILM COATED ORAL
Status: DISCONTINUED | OUTPATIENT
Start: 2024-03-17 | End: 2024-03-21 | Stop reason: HOSPADM

## 2024-03-17 RX ORDER — ONDANSETRON 2 MG/ML
INJECTION INTRAMUSCULAR; INTRAVENOUS PRN
Status: DISCONTINUED | OUTPATIENT
Start: 2024-03-17 | End: 2024-03-17 | Stop reason: SDUPTHER

## 2024-03-17 RX ORDER — HYDRALAZINE HYDROCHLORIDE 20 MG/ML
10 INJECTION INTRAMUSCULAR; INTRAVENOUS
Status: DISCONTINUED | OUTPATIENT
Start: 2024-03-17 | End: 2024-03-17 | Stop reason: HOSPADM

## 2024-03-17 RX ORDER — LABETALOL HYDROCHLORIDE 5 MG/ML
10 INJECTION, SOLUTION INTRAVENOUS
Status: DISCONTINUED | OUTPATIENT
Start: 2024-03-17 | End: 2024-03-17 | Stop reason: HOSPADM

## 2024-03-17 RX ORDER — SODIUM CHLORIDE 9 MG/ML
INJECTION, SOLUTION INTRAVENOUS PRN
Status: DISCONTINUED | OUTPATIENT
Start: 2024-03-17 | End: 2024-03-17 | Stop reason: HOSPADM

## 2024-03-17 RX ORDER — SODIUM CHLORIDE 9 MG/ML
INJECTION, SOLUTION INTRAVENOUS PRN
Status: DISCONTINUED | OUTPATIENT
Start: 2024-03-17 | End: 2024-03-21 | Stop reason: HOSPADM

## 2024-03-17 RX ORDER — METOCLOPRAMIDE HYDROCHLORIDE 5 MG/ML
10 INJECTION INTRAMUSCULAR; INTRAVENOUS
Status: DISCONTINUED | OUTPATIENT
Start: 2024-03-17 | End: 2024-03-17 | Stop reason: HOSPADM

## 2024-03-17 RX ADMIN — TRAZODONE HYDROCHLORIDE 100 MG: 50 TABLET ORAL at 20:26

## 2024-03-17 RX ADMIN — SODIUM CHLORIDE: 9 INJECTION, SOLUTION INTRAVENOUS at 16:45

## 2024-03-17 RX ADMIN — ATORVASTATIN CALCIUM 40 MG: 40 TABLET, FILM COATED ORAL at 16:46

## 2024-03-17 RX ADMIN — HYDROMORPHONE HYDROCHLORIDE 0.5 MG: 1 INJECTION, SOLUTION INTRAMUSCULAR; INTRAVENOUS; SUBCUTANEOUS at 19:03

## 2024-03-17 RX ADMIN — ONDANSETRON 4 MG: 2 INJECTION INTRAMUSCULAR; INTRAVENOUS at 13:49

## 2024-03-17 RX ADMIN — FENTANYL CITRATE 50 MCG: 50 INJECTION, SOLUTION INTRAMUSCULAR; INTRAVENOUS at 13:49

## 2024-03-17 RX ADMIN — BUSPIRONE HYDROCHLORIDE 15 MG: 10 TABLET ORAL at 20:26

## 2024-03-17 RX ADMIN — SODIUM CHLORIDE 1000 ML: 9 INJECTION, SOLUTION INTRAVENOUS at 04:33

## 2024-03-17 RX ADMIN — ONDANSETRON 2 MG: 2 INJECTION INTRAMUSCULAR; INTRAVENOUS at 02:13

## 2024-03-17 RX ADMIN — METOPROLOL TARTRATE 25 MG: 25 TABLET, FILM COATED ORAL at 20:25

## 2024-03-17 RX ADMIN — ACETAMINOPHEN 650 MG: 325 TABLET ORAL at 20:25

## 2024-03-17 RX ADMIN — TAMSULOSIN HYDROCHLORIDE 0.4 MG: 0.4 CAPSULE ORAL at 04:34

## 2024-03-17 RX ADMIN — KETOROLAC TROMETHAMINE 30 MG: 30 INJECTION, SOLUTION INTRAMUSCULAR at 14:11

## 2024-03-17 RX ADMIN — MORPHINE SULFATE 4 MG: 4 INJECTION, SOLUTION INTRAMUSCULAR; INTRAVENOUS at 06:32

## 2024-03-17 RX ADMIN — DONEPEZIL HYDROCHLORIDE 10 MG: 5 TABLET, FILM COATED ORAL at 20:26

## 2024-03-17 RX ADMIN — MORPHINE SULFATE 4 MG: 4 INJECTION, SOLUTION INTRAMUSCULAR; INTRAVENOUS at 02:13

## 2024-03-17 RX ADMIN — PROPOFOL 100 MG: 10 INJECTION, EMULSION INTRAVENOUS at 13:34

## 2024-03-17 RX ADMIN — SODIUM CHLORIDE 600 ML: 9 INJECTION, SOLUTION INTRAVENOUS at 13:56

## 2024-03-17 RX ADMIN — DEXAMETHASONE SODIUM PHOSPHATE 4 MG: 4 INJECTION, SOLUTION INTRA-ARTICULAR; INTRALESIONAL; INTRAMUSCULAR; INTRAVENOUS; SOFT TISSUE at 13:49

## 2024-03-17 RX ADMIN — SODIUM CHLORIDE: 9 INJECTION, SOLUTION INTRAVENOUS at 10:42

## 2024-03-17 RX ADMIN — LEVOTHYROXINE SODIUM 125 MCG: 0.03 TABLET ORAL at 16:46

## 2024-03-17 RX ADMIN — EPHEDRINE SULFATE 15 MG: 50 INJECTION INTRAVENOUS at 13:40

## 2024-03-17 RX ADMIN — CEFTRIAXONE SODIUM 2000 MG: 2 INJECTION, POWDER, FOR SOLUTION INTRAMUSCULAR; INTRAVENOUS at 12:45

## 2024-03-17 RX ADMIN — FENTANYL CITRATE 50 MCG: 50 INJECTION, SOLUTION INTRAMUSCULAR; INTRAVENOUS at 13:34

## 2024-03-17 RX ADMIN — SODIUM CHLORIDE, PRESERVATIVE FREE 5 ML: 5 INJECTION INTRAVENOUS at 17:43

## 2024-03-17 RX ADMIN — SODIUM CHLORIDE, PRESERVATIVE FREE 10 ML: 5 INJECTION INTRAVENOUS at 20:26

## 2024-03-17 RX ADMIN — EPHEDRINE SULFATE 25 MG: 50 INJECTION INTRAVENOUS at 13:45

## 2024-03-17 RX ADMIN — LIDOCAINE HYDROCHLORIDE 80 MG: 20 INJECTION, SOLUTION EPIDURAL; INFILTRATION; INTRACAUDAL; PERINEURAL at 13:34

## 2024-03-17 ASSESSMENT — PAIN DESCRIPTION - ORIENTATION
ORIENTATION: MID;LOWER
ORIENTATION: UPPER
ORIENTATION: LOWER

## 2024-03-17 ASSESSMENT — PAIN DESCRIPTION - DESCRIPTORS
DESCRIPTORS: DISCOMFORT;ACHING
DESCRIPTORS: ACHING
DESCRIPTORS: SORE
DESCRIPTORS: ACHING

## 2024-03-17 ASSESSMENT — PAIN SCALES - GENERAL
PAINLEVEL_OUTOF10: 2
PAINLEVEL_OUTOF10: 8
PAINLEVEL_OUTOF10: 6
PAINLEVEL_OUTOF10: 0
PAINLEVEL_OUTOF10: 3
PAINLEVEL_OUTOF10: 6
PAINLEVEL_OUTOF10: 9
PAINLEVEL_OUTOF10: 5

## 2024-03-17 ASSESSMENT — PAIN DESCRIPTION - LOCATION
LOCATION: ABDOMEN
LOCATION: BACK

## 2024-03-17 ASSESSMENT — LIFESTYLE VARIABLES
HOW MANY STANDARD DRINKS CONTAINING ALCOHOL DO YOU HAVE ON A TYPICAL DAY: PATIENT DOES NOT DRINK
HOW MANY STANDARD DRINKS CONTAINING ALCOHOL DO YOU HAVE ON A TYPICAL DAY: PATIENT DOES NOT DRINK
HOW OFTEN DO YOU HAVE A DRINK CONTAINING ALCOHOL: NEVER
HOW OFTEN DO YOU HAVE A DRINK CONTAINING ALCOHOL: NEVER

## 2024-03-17 ASSESSMENT — PAIN DESCRIPTION - PAIN TYPE: TYPE: ACUTE PAIN

## 2024-03-17 ASSESSMENT — PAIN - FUNCTIONAL ASSESSMENT
PAIN_FUNCTIONAL_ASSESSMENT: 0-10
PAIN_FUNCTIONAL_ASSESSMENT: ACTIVITIES ARE NOT PREVENTED
PAIN_FUNCTIONAL_ASSESSMENT: WONG-BAKER FACES

## 2024-03-17 ASSESSMENT — PAIN SCALES - WONG BAKER
WONGBAKER_NUMERICALRESPONSE: NO HURT
WONGBAKER_NUMERICALRESPONSE: NO HURT
WONGBAKER_NUMERICALRESPONSE: HURTS EVEN MORE

## 2024-03-17 NOTE — PROGRESS NOTES
Patient arrival to unit in bed 441. She isconfused confused but alert and cooperative and can recall some things. Reports abdominal pain 5/10. Denies nausea. Does report pain with urination and burning.

## 2024-03-17 NOTE — PROGRESS NOTES
Patient has pulled out her IV and will not let nurse place another. She is very angry about it and saying aggressively \"just forget it\". Dr. Hernandez notified. She is becoming more restless and confused. Will request oral pain medication for c/o pain.

## 2024-03-17 NOTE — ED NOTES
ED TO INPATIENT SBAR HANDOFF    Patient Name: Jimena Sepulveda   Preferred Name: Jimena  : 1955  68 y.o.   Family/Caregiver Present: no   Code Status Order: Prior  PO Status: NPO:No  Telemetry Order:   C-SSRS: Risk of Suicide: No Risk  Sitter no   Restraints:     Sepsis Risk Score Sepsis Risk Score: 0.56    Situation  No chief complaint on file.    Brief Description of Patient's Condition: stable  Mental Status: oriented and alert  [facility] from:[facility]  Imaging:   No orders to display     Abnormal labs: Abnormal Labs Reviewed - No data to display    Background  Allergies:   Allergies   Allergen Reactions    Food Rash     Terriaki sauce    Penicillins Nausea And Vomiting     History:   Past Medical History:   Diagnosis Date    Anemia     Blind     Blood in stool 2023    Diarrhea     History of colonic polyps 2023    [facility]    Lower abdominal pain     Stroke (HCC) 2018    Thyroid disorder        Assessment  Vitals:        Vitals:    24 0616 24 0629 24 0659 24 0715   BP: (!) 165/70 (!) 148/63 (!) 147/83 (!) 154/65   Pulse: 68 68 68 69   Resp: 15 19 17 14   Temp: 98.2 °F (36.8 °C)      TempSrc: Oral      SpO2: 95% 93% 92% 94%     Deterioration Index (DI): Deterioration Index: 27.91  Deterioration Index (DI) Interventions Performed:    O2 Flow Rate:    O2 Device: O2 Device: None (Room air)  Cardiac Rhythm:    Critical Lab Results: [unfilled]  Cultures: no  NIH Score: NIH     Active LDA's:   Peripheral IV 24 Left Forearm (Active)     Active Central Lines:                          Active Wounds:    Active Jj's:    Active Feeding Tubes:      Administered Medications:   Medications   morphine (PF) injection 4 mg (4 mg IntraVENous Given 3/17/24 0632)     Last documented pain medication administration: Morphine @ 6:32 am  Pertinent or High Risk Medications/Drips: no   If Yes, please provide details:   Blood Product Administration: no  If Yes, please provide

## 2024-03-17 NOTE — ED NOTES
Patient stable at time of transfer. Report given to Ary with [facility]. Patient being moved to their stretcher at this time.

## 2024-03-17 NOTE — ED PROVIDER NOTES
16 6 - 20 mg/dL    Creatinine 1.64 (H) 0.55 - 1.02 mg/dL    Bun/Cre Ratio 10 (L) 12 - 20      Est, Glom Filt Rate 34 (L) >60 ml/min/1.73m2    Calcium 8.4 (L) 8.5 - 10.1 mg/dL    Total Bilirubin 0.4 0.2 - 1.0 mg/dL    AST 17 15 - 37 U/L    ALT 11 (L) 12 - 78 U/L    Alk Phosphatase 79 45 - 117 U/L    Total Protein 6.2 (L) 6.4 - 8.2 g/dL    Albumin 3.1 (L) 3.5 - 5.0 g/dL    Globulin 3.1 2.0 - 4.0 g/dL    Albumin/Globulin Ratio 1.0 (L) 1.1 - 2.2     Lipase    Collection Time: 03/17/24  1:52 AM   Result Value Ref Range    Lipase 53 13 - 75 U/L   Troponin    Collection Time: 03/17/24  1:52 AM   Result Value Ref Range    Troponin, High Sensitivity 11 0 - 51 ng/L   Magnesium    Collection Time: 03/17/24  1:52 AM   Result Value Ref Range    Magnesium 1.7 1.6 - 2.4 mg/dL   EKG 12 Lead    Collection Time: 03/17/24  1:54 AM   Result Value Ref Range    Ventricular Rate 72 BPM    Atrial Rate 72 BPM    P-R Interval 182 ms    QRS Duration 108 ms    Q-T Interval 410 ms    QTc Calculation (Bazett) 449 ms    P Axis 78 degrees    R Axis 45 degrees    T Axis 53 degrees    Diagnosis Sinus rhythm  Low voltage, precordial leads      Troponin    Collection Time: 03/17/24  3:33 AM   Result Value Ref Range    Troponin, High Sensitivity 12 0 - 51 ng/L   Urinalysis with Reflex to Culture    Collection Time: 03/17/24  4:23 AM    Specimen: Urine   Result Value Ref Range    Color, UA Yellow/Straw      Appearance Clear Clear      Specific Gravity, UA 1.010 1.003 - 1.030      pH, Urine 7.0 5.0 - 8.0      Protein, UA Negative Negative mg/dL    Glucose, UA Negative Negative mg/dL    Ketones, Urine Negative Negative mg/dL    Bilirubin Urine Negative Negative      Blood, Urine Moderate (A) Negative      Urobilinogen, Urine 1.0 0.2 - 1.0 EU/dL    Nitrite, Urine Negative Negative      Leukocyte Esterase, Urine Negative Negative       Radiologic Studies:  CT ABDOMEN PELVIS WO CONTRAST Additional Contrast? None   Final Result   1.  Bilateral renal calculi.

## 2024-03-17 NOTE — ED NOTES
Received report on the patient at the change of shift. Patient is A+O X 4 and is waiting for a bed for admission to the nephrology services. Patient has bilateral kidney stones. Morphine given for pain with good effect. Patient denies pain or discomfort at this time. VSS. SERENITY.

## 2024-03-17 NOTE — H&P
Hospitalist Admission Note    NAME: Jimena Sepulveda   :  1955   MRN:  [id number]     Date/Time:  3/17/2024 8:31 AM    Patient PCP: Bertha Contreras    ______________________________________________________________________  Given the patient's current clinical presentation, I have a high level of concern for decompensation if discharged from the emergency department.  Complex decision making was performed, which includes reviewing the patient's available past medical records, laboratory results, and x-ray films.       My assessment of this patient's clinical condition and my plan of care is as follows.    Assessment / Plan:    Right hydronephrosis secondary to urolithiasis  CT reveals severe hydronephrosis with 2 distal stones 4 mm and 7 mm in size.  Urology consultation  Rocephin  Pain medications  N.p.o.  Urinalysis during intervention or urine culture due to complete obstruction of the right ureter    Hypothyroidism  Continue levothyroxine    Blindness  Stable    Hypercholesterolemia  Continue Lipitor    Essential hypertension  Continue metoprolol    History of stroke  No obvious residual effects  Continue aspirin when appropriate    Presumed STEPHANIE  No further creatinine noted  Monitor creatinine closely  Patient has received IV fluids in the emergency department    Microcytic anemia  Hemoglobin 7.8  MCV 71.8  Iron panel  Type and cross    Medical Decision Making     [x] High (any 2)     A. Problems (any 1)  [x] Acute/Chronic Illness/injury posing threat to life or bodily function:    [] Severe exacerbation of chronic illness:    ---------------------------------------------------------------------  B. Risk of Treatment (any 1)   [] Drugs/treatments that require intensive monitoring for toxicity include:    [x] IV ABX requiring serial renal monitoring for nephrotoxicity:     [] IV Narcotic analgesia for adverse drug reaction  [] Aggressive IV diuresis requiring serial monitoring for renal impairment and  AM   Result Value Ref Range    Troponin, High Sensitivity 11 0 - 51 ng/L   Magnesium    Collection Time: 03/17/24  1:52 AM   Result Value Ref Range    Magnesium 1.7 1.6 - 2.4 mg/dL   EKG 12 Lead    Collection Time: 03/17/24  1:54 AM   Result Value Ref Range    Ventricular Rate 72 BPM    Atrial Rate 72 BPM    P-R Interval 182 ms    QRS Duration 108 ms    Q-T Interval 410 ms    QTc Calculation (Bazett) 449 ms    P Axis 78 degrees    R Axis 45 degrees    T Axis 53 degrees    Diagnosis Sinus rhythm  Low voltage, precordial leads      Troponin    Collection Time: 03/17/24  3:33 AM   Result Value Ref Range    Troponin, High Sensitivity 12 0 - 51 ng/L   Urinalysis with Reflex to Culture    Collection Time: 03/17/24  4:23 AM    Specimen: Urine   Result Value Ref Range    Color, UA Yellow/Straw      Appearance Clear Clear      Specific Gravity, UA 1.010 1.003 - 1.030      pH, Urine 7.0 5.0 - 8.0      Protein, UA Negative Negative mg/dL    Glucose, UA Negative Negative mg/dL    Ketones, Urine Negative Negative mg/dL    Bilirubin Urine Negative Negative      Blood, Urine Moderate (A) Negative      Urobilinogen, Urine 1.0 0.2 - 1.0 EU/dL    Nitrite, Urine Negative Negative      Leukocyte Esterase, Urine Negative Negative           CT Result (most recent):  CT ABDOMEN PELVIS WO IV CONTRAST 03/17/2024    Narrative  EXAM: CT ABDOMEN PELVIS WO CONTRAST    INDICATION: periumbilical adbominal tenderness, vomiting    COMPARISON: None    IV CONTRAST: None.    ORAL CONTRAST: None    TECHNIQUE:  Thin axial images were obtained through the abdomen and pelvis. Coronal and  sagittal reformats were generated. CT dose reduction was achieved through use of  a standardized protocol tailored for this examination and automatic exposure  control for dose modulation.    The absence of intravenous contrast material reduces the sensitivity for  evaluation of the vasculature and solid organs.    FINDINGS:  LOWER THORAX: No significant abnormality in

## 2024-03-17 NOTE — CONSULTS
nonradiating, and severe continuous pain.  It is associated with nausea and 1 episode of nonbloody nonbilious vomiting.  She denies any chest pain or shortness of breath.      Medical History   I reviewed the medical, surgical, family, and social history, as well as allergies:     PCP: Bertha Contreras     Past Medical History:  Past Medical History        Past Medical History:   Diagnosis Date    Anemia      Blind      Blood in stool 6/2/2023    Diarrhea      History of colonic polyps 03/08/2023     [facility]    Lower abdominal pain      Stroke (HCC) 2018    Thyroid disorder           Past Surgical History:  Past Surgical History         Past Surgical History:   Procedure Laterality Date    COLONOSCOPY N/A       march 8, 2023 at [facility]   colon polyps    COLONOSCOPY N/A 6/8/2023     COLONOSCOPY WITH HOT SNARE POLYPECTOMY  performed by Jh Pelayo Jr., MD at [facility] ENDOSCOPY    HYSTERECTOMY, TOTAL ABDOMINAL (CERVIX REMOVED)        ORTHOPEDIC SURGERY   1980     back surgery    TONSILLECTOMY N/A           Current Outpatient Medications:       Current Outpatient Medications   Medication Instructions    aspirin 81 mg, Oral, DAILY    atorvastatin (LIPITOR) 40 mg, Oral, DAILY    busPIRone (BUSPAR) 15 mg, Oral, 2 TIMES DAILY    [employer]S ANTI-DIARRHEAL 2 MG tablet 1 tablet as needed    diclofenac sodium (VOLTAREN) 2 g, 4 TIMES DAILY PRN    dicyclomine (BENTYL) 10 mg, Oral, 3 TIMES DAILY PRN    donepezil (ARICEPT) 10 MG tablet TAKE 1 TABLET BY MOUTH EVERYDAY AT BEDTIME    levothyroxine (SYNTHROID) 150 mcg, Oral, DAILY BEFORE BREAKFAST    metoprolol tartrate (LOPRESSOR) 25 mg, Oral, 2 TIMES DAILY    metroNIDAZOLE (FLAGYL) 500 mg, Oral, 3 TIMES DAILY    MYRBETRIQ 50 MG TB24 No dose, route, or frequency recorded.    potassium chloride (KLOR-CON) 10 MEQ extended release tablet No dose, route, or frequency recorded.    traZODone (DESYREL) 200 mg, Oral, NIGHTLY    vitamin D (ERGOCALCIFEROL) 1.25 MG (57487 UT) CAPS capsule TAKE 1 CAPSULE BY  Active Problem List   Diagnosis    Diarrhea    Lower abdominal pain    Anemia    History of colonic polyps    Thyroid disorder    Blood in stool    Ureterolithiasis       Plan: Cystoscopy double-J stent placement on the right           Signed By: MEHREEN DONAHUE MD     March 17, 2024      Please note that portions of this note were completed with Dragon dictation, the computer voice recognition software.  Quite often unanticipated grammatical, syntax, homophones, and other interpretive errors are inadvertently transcribed by the computer software.  Please disregard these errors and any other errors that may have escaped final proofreading.  Thank you.

## 2024-03-17 NOTE — PROGRESS NOTES
Patient transfer from Ed to room 441. Her pain is tolerable at this time. She rates abdominal pain surrounding umbilicus 5/10. She is somewhat confused and forgetful, but pleasant and cooperative. Orders reviewed with patient. Will reach out to  and update.    1200 - Spoke with  and provided update. Patient has dementia and can not consent for the procedure herself. Will page Doctor Joe to phone  to explain upcoming procedure.  and POA [facility] [phone]

## 2024-03-17 NOTE — ED PROVIDER NOTES
[facility] EMERGENCY DEPT  EMERGENCY DEPARTMENT HISTORY AND PHYSICAL EXAM      Date: 3/17/2024  Patient Name: Jimena Sepulveda  MRN: [id number]  Birthdate 1955  Date of evaluation: 3/17/2024  Provider: Minor Kohli MD   Note Started: 6:10 AM EDT 3/17/24    HISTORY OF PRESENT ILLNESS   No chief complaint on file.      History Provided By: Patient    HPI: Jimena Sepulveda is a 68 y.o. female who comes to the ED transfer from [facility] after being diagnosed with obstructive nephrolithiasis.  Patient presents to the office today due to periumbilical pain.  She was worked up and her workup revealed that she has right-sided severe hydronephrosis secondary to multiple stones in the distal right ureter measuring between 4 and 7 mm.  She was sent to the ED due to lack of urology coverage at that facility.  Patient report that she has abdominal pain nausea and vomiting.  Currently her symptoms are under control and she did not require any further medication.  She denies any other recent illness or any other concerns.    PAST MEDICAL HISTORY   Past Medical History:  Past Medical History:   Diagnosis Date    Anemia     Blind     Blood in stool 6/2/2023    Diarrhea     History of colonic polyps 03/08/2023    [facility]    Lower abdominal pain     Stroke (HCC) 2018    Thyroid disorder        Past Surgical History:  Past Surgical History:   Procedure Laterality Date    COLONOSCOPY N/A     march 8, 2023 at [facility]   colon polyps    COLONOSCOPY N/A 6/8/2023    COLONOSCOPY WITH HOT SNARE POLYPECTOMY  performed by Jh Pelayo Jr., MD at [facility] ENDOSCOPY    HYSTERECTOMY, TOTAL ABDOMINAL (CERVIX REMOVED)      ORTHOPEDIC SURGERY  1980    back surgery    TONSILLECTOMY N/A        Family History:  Family History   Problem Relation Age of Onset    Hypertension Mother     Cancer Father     Heart Disease Paternal Grandfather     Diabetes Other     Hypertension Other     Stroke Other        Social History:  Social  9 5 - 13 %    Eosinophils % 2 0 - 7 %    Basophils % 0 0 - 1 %    Immature Granulocytes 0 0 - 0.5 %    Neutrophils Absolute 4.0 1.8 - 8.0 K/UL    Lymphocytes Absolute 1.1 0.8 - 3.5 K/UL    Monocytes Absolute 0.5 0.0 - 1.0 K/UL    Eosinophils Absolute 0.1 0.0 - 0.4 K/UL    Basophils Absolute 0.0 0.0 - 0.1 K/UL    Absolute Immature Granulocyte 0.0 0.00 - 0.04 K/UL    Differential Type Smear Scanned      RBC Comment Anisocytosis  2+       Comprehensive Metabolic Panel    Collection Time: 03/17/24  1:52 AM   Result Value Ref Range    Sodium 142 136 - 145 mmol/L    Potassium 3.7 3.5 - 5.1 mmol/L    Chloride 106 97 - 108 mmol/L    CO2 26 21 - 32 mmol/L    Anion Gap 10 5 - 15 mmol/L    Glucose 129 (H) 65 - 100 mg/dL    BUN 16 6 - 20 mg/dL    Creatinine 1.64 (H) 0.55 - 1.02 mg/dL    Bun/Cre Ratio 10 (L) 12 - 20      Est, Glom Filt Rate 34 (L) >60 ml/min/1.73m2    Calcium 8.4 (L) 8.5 - 10.1 mg/dL    Total Bilirubin 0.4 0.2 - 1.0 mg/dL    AST 17 15 - 37 U/L    ALT 11 (L) 12 - 78 U/L    Alk Phosphatase 79 45 - 117 U/L    Total Protein 6.2 (L) 6.4 - 8.2 g/dL    Albumin 3.1 (L) 3.5 - 5.0 g/dL    Globulin 3.1 2.0 - 4.0 g/dL    Albumin/Globulin Ratio 1.0 (L) 1.1 - 2.2     Lipase    Collection Time: 03/17/24  1:52 AM   Result Value Ref Range    Lipase 53 13 - 75 U/L   Troponin    Collection Time: 03/17/24  1:52 AM   Result Value Ref Range    Troponin, High Sensitivity 11 0 - 51 ng/L   Magnesium    Collection Time: 03/17/24  1:52 AM   Result Value Ref Range    Magnesium 1.7 1.6 - 2.4 mg/dL   EKG 12 Lead    Collection Time: 03/17/24  1:54 AM   Result Value Ref Range    Ventricular Rate 72 BPM    Atrial Rate 72 BPM    P-R Interval 182 ms    QRS Duration 108 ms    Q-T Interval 410 ms    QTc Calculation (Bazett) 449 ms    P Axis 78 degrees    R Axis 45 degrees    T Axis 53 degrees    Diagnosis Sinus rhythm  Low voltage, precordial leads      Troponin    Collection Time: 03/17/24  3:33 AM   Result Value Ref Range    Troponin, High

## 2024-03-17 NOTE — ED TRIAGE NOTES
Transfer from [place] for Uro consult . Came to ED for abdominal pain with nausea/vomiting x 3 episodes. Denies shortness of breath or chest pain.    Noted multiple kidney stones on scan.

## 2024-03-17 NOTE — ED TRIAGE NOTES
Patient presents via [facility] from [facility] for complaint of abdominal pain, nausea, vomiting x1 episode and dizziness.

## 2024-03-17 NOTE — OP NOTE
Operative Note      Patient: Jimena Sepulveda  YOB: 1955  MRN: [id number]    Date of Procedure: 3/17/2024    Preop dx-right ureteral stone, right hydronephrosis    Post-Op Diagnosis: Same       Procedure(s):  CYSTOSCOPY, RIGHT DOUBLE J STENT PLACEMENT    Surgeon(s):  Doc Diaz MD    Assistant:   * No surgical staff found *    Anesthesia: General    Estimated Blood Loss (mL): Minimal    Complications: None    Specimens:   * No specimens in log *    Implants:  Implant Name Type Inv. Item Serial No.  Lot No. LRB No. Used Action   STENT URET 7FR [id number] PERCFLX HYDR+ SFT PGTL TAPR TIP - SN/A  STENT URET 7FR [id number] PERCFLX HYDR+ SFT PGTL TAPR TIP N/A [employer] N/A Right 1 Implanted         Drains: * No LDAs found *    Findings: stones in ureter and tiny one in bladder        Detailed Description of Procedure:   Patient set up for double-J stent placement on the right.  She has multiple stones in her right ureter sent for a stent to decompress her kidney and then set her up for a later ureteroscopy with lithotripsy.  She is aware the risk of bleeding infection sepsis death injury to the ureter pulmonary embolus she has no questions  Procedure-patient prepped and draped in sterile fashion undergoing  anesthesia placed in lithotomy position..  Timeout was performed preoperative antibiotics were given SCDs were applied.  Patient was scope with 21 Hebrew cystoscope.  Normal ureteral orifices bilateral no stones or tumors appreciated.  I then placed a 0.35 Glidewire up the right ureteral orifice into the kidney.  Under diagnostic fluoroscopy.  And a small stone dropped out of the ureter less than a millimeter in size.  I then placed a 7-22/30 double-J ureteral stents.  It passed nicely up into the kidney.  It curled nicely in the kidney and then the bladder and then emptied the bladder.  I put lidocaine per urethra.  Emptied her bladder.  I took her off the table to the

## 2024-03-18 ENCOUNTER — TELEPHONE (OUTPATIENT)
Age: 69
End: 2024-03-18

## 2024-03-18 LAB
ALBUMIN SERPL-MCNC: 2.9 G/DL (ref 3.5–5)
ALBUMIN/GLOB SERPL: 0.9 (ref 1.1–2.2)
ALP SERPL-CCNC: 72 U/L (ref 45–117)
ALT SERPL-CCNC: 10 U/L (ref 12–78)
ANION GAP SERPL CALC-SCNC: 2 MMOL/L (ref 5–15)
AST SERPL W P-5'-P-CCNC: 12 U/L (ref 15–37)
BASOPHILS # BLD: 0 K/UL (ref 0–0.1)
BASOPHILS NFR BLD: 0 % (ref 0–1)
BILIRUB SERPL-MCNC: 0.2 MG/DL (ref 0.2–1)
BUN SERPL-MCNC: 14 MG/DL (ref 6–20)
BUN/CREAT SERPL: 11 (ref 12–20)
CA-I BLD-MCNC: 8 MG/DL (ref 8.5–10.1)
CHLORIDE SERPL-SCNC: 113 MMOL/L (ref 97–108)
CO2 SERPL-SCNC: 26 MMOL/L (ref 21–32)
CREAT SERPL-MCNC: 1.24 MG/DL (ref 0.55–1.02)
DIFFERENTIAL METHOD BLD: ABNORMAL
EKG ATRIAL RATE: 72 BPM
EKG DIAGNOSIS: NORMAL
EKG P AXIS: 78 DEGREES
EKG P-R INTERVAL: 182 MS
EKG Q-T INTERVAL: 410 MS
EKG QRS DURATION: 108 MS
EKG QTC CALCULATION (BAZETT): 449 MS
EKG R AXIS: 45 DEGREES
EKG T AXIS: 53 DEGREES
EKG VENTRICULAR RATE: 72 BPM
EOSINOPHIL # BLD: 0.1 K/UL (ref 0–0.4)
EOSINOPHIL NFR BLD: 1 % (ref 0–7)
ERYTHROCYTE [DISTWIDTH] IN BLOOD BY AUTOMATED COUNT: 22 % (ref 11.5–14.5)
GLOBULIN SER CALC-MCNC: 3.1 G/DL (ref 2–4)
GLUCOSE SERPL-MCNC: 123 MG/DL (ref 65–100)
HCT VFR BLD AUTO: 26.5 % (ref 35–47)
HGB BLD-MCNC: 7.5 G/DL (ref 11.5–16)
IMM GRANULOCYTES # BLD AUTO: 0 K/UL (ref 0–0.04)
IMM GRANULOCYTES NFR BLD AUTO: 1 % (ref 0–0.5)
IRON SATN MFR SERPL: 6 % (ref 20–50)
IRON SERPL-MCNC: 21 UG/DL (ref 35–150)
LYMPHOCYTES # BLD: 1.2 K/UL (ref 0.8–3.5)
LYMPHOCYTES NFR BLD: 21 % (ref 12–49)
MCH RBC QN AUTO: 20.5 PG (ref 26–34)
MCHC RBC AUTO-ENTMCNC: 28.3 G/DL (ref 30–36.5)
MCV RBC AUTO: 72.4 FL (ref 80–99)
MONOCYTES # BLD: 0.4 K/UL (ref 0–1)
MONOCYTES NFR BLD: 7 % (ref 5–13)
NEUTS SEG # BLD: 4 K/UL (ref 1.8–8)
NEUTS SEG NFR BLD: 70 % (ref 32–75)
NRBC # BLD: 0 K/UL (ref 0–0.01)
NRBC BLD-RTO: 0 PER 100 WBC
PLATELET # BLD AUTO: 173 K/UL (ref 150–400)
PMV BLD AUTO: 10.4 FL (ref 8.9–12.9)
POTASSIUM SERPL-SCNC: 3.6 MMOL/L (ref 3.5–5.1)
PROT SERPL-MCNC: 6 G/DL (ref 6.4–8.2)
RBC # BLD AUTO: 3.66 M/UL (ref 3.8–5.2)
SODIUM SERPL-SCNC: 141 MMOL/L (ref 136–145)
TIBC SERPL-MCNC: 356 UG/DL (ref 250–450)
WBC # BLD AUTO: 5.6 K/UL (ref 3.6–11)

## 2024-03-18 PROCEDURE — 6370000000 HC RX 637 (ALT 250 FOR IP): Performed by: PHYSICIAN ASSISTANT

## 2024-03-18 PROCEDURE — 80053 COMPREHEN METABOLIC PANEL: CPT

## 2024-03-18 PROCEDURE — 85025 COMPLETE CBC W/AUTO DIFF WBC: CPT

## 2024-03-18 PROCEDURE — 1100000000 HC RM PRIVATE

## 2024-03-18 PROCEDURE — 36415 COLL VENOUS BLD VENIPUNCTURE: CPT

## 2024-03-18 PROCEDURE — 6360000002 HC RX W HCPCS: Performed by: PHYSICIAN ASSISTANT

## 2024-03-18 PROCEDURE — 2580000003 HC RX 258: Performed by: PHYSICIAN ASSISTANT

## 2024-03-18 PROCEDURE — 2500000003 HC RX 250 WO HCPCS: Performed by: PHYSICIAN ASSISTANT

## 2024-03-18 RX ADMIN — BUSPIRONE HYDROCHLORIDE 15 MG: 10 TABLET ORAL at 21:23

## 2024-03-18 RX ADMIN — SODIUM CHLORIDE, PRESERVATIVE FREE 10 ML: 5 INJECTION INTRAVENOUS at 21:24

## 2024-03-18 RX ADMIN — SODIUM CHLORIDE: 9 INJECTION, SOLUTION INTRAVENOUS at 08:48

## 2024-03-18 RX ADMIN — HYDROMORPHONE HYDROCHLORIDE 0.5 MG: 1 INJECTION, SOLUTION INTRAMUSCULAR; INTRAVENOUS; SUBCUTANEOUS at 06:17

## 2024-03-18 RX ADMIN — SODIUM CHLORIDE: 9 INJECTION, SOLUTION INTRAVENOUS at 22:06

## 2024-03-18 RX ADMIN — SODIUM CHLORIDE, PRESERVATIVE FREE 10 ML: 5 INJECTION INTRAVENOUS at 09:20

## 2024-03-18 RX ADMIN — DONEPEZIL HYDROCHLORIDE 10 MG: 5 TABLET, FILM COATED ORAL at 21:24

## 2024-03-18 RX ADMIN — TRAZODONE HYDROCHLORIDE 100 MG: 50 TABLET ORAL at 21:24

## 2024-03-18 RX ADMIN — BUSPIRONE HYDROCHLORIDE 15 MG: 10 TABLET ORAL at 09:16

## 2024-03-18 RX ADMIN — ATORVASTATIN CALCIUM 40 MG: 40 TABLET, FILM COATED ORAL at 09:11

## 2024-03-18 RX ADMIN — ACETAMINOPHEN 650 MG: 325 TABLET ORAL at 13:28

## 2024-03-18 RX ADMIN — LEVOTHYROXINE SODIUM 125 MCG: 0.03 TABLET ORAL at 05:20

## 2024-03-18 RX ADMIN — METOPROLOL TARTRATE 25 MG: 25 TABLET, FILM COATED ORAL at 10:39

## 2024-03-18 RX ADMIN — HYDROMORPHONE HYDROCHLORIDE 0.5 MG: 1 INJECTION, SOLUTION INTRAMUSCULAR; INTRAVENOUS; SUBCUTANEOUS at 21:23

## 2024-03-18 RX ADMIN — CEFTRIAXONE SODIUM 2000 MG: 2 INJECTION, POWDER, FOR SOLUTION INTRAMUSCULAR; INTRAVENOUS at 09:10

## 2024-03-18 ASSESSMENT — PAIN DESCRIPTION - ORIENTATION
ORIENTATION: RIGHT;POSTERIOR;LEFT
ORIENTATION: RIGHT

## 2024-03-18 ASSESSMENT — PAIN - FUNCTIONAL ASSESSMENT
PAIN_FUNCTIONAL_ASSESSMENT: PREVENTS OR INTERFERES SOME ACTIVE ACTIVITIES AND ADLS
PAIN_FUNCTIONAL_ASSESSMENT: ACTIVITIES ARE NOT PREVENTED

## 2024-03-18 ASSESSMENT — PAIN DESCRIPTION - LOCATION
LOCATION: ABDOMEN;BACK
LOCATION: ABDOMEN

## 2024-03-18 ASSESSMENT — PAIN SCALES - GENERAL
PAINLEVEL_OUTOF10: 10
PAINLEVEL_OUTOF10: 4
PAINLEVEL_OUTOF10: 7
PAINLEVEL_OUTOF10: 3

## 2024-03-18 ASSESSMENT — PAIN SCALES - WONG BAKER: WONGBAKER_NUMERICALRESPONSE: HURTS A LITTLE BIT

## 2024-03-18 ASSESSMENT — PAIN DESCRIPTION - DESCRIPTORS
DESCRIPTORS: ACHING
DESCRIPTORS: STABBING

## 2024-03-18 NOTE — TELEPHONE ENCOUNTER
Please schedule patient to follow up with Dr. Diaz in 1 week for stone management. Please call her .Thanks

## 2024-03-18 NOTE — TELEPHONE ENCOUNTER
Pt  called back, he informed me the pt is at [facility] and they will need to be called to schedule the appt, but he is not sure when she will be out of the hospital.  He provided me the number to [facility] - [phone]  Attempted to call for the second time to schedule pt an appt, the line keeps ringing busy when I call.

## 2024-03-18 NOTE — PROGRESS NOTES
UROLOGY Progress Note         [phone]      Daily Progress Note: 3/18/2024    Subjective:   The patient is seen for UROLOGIC follow up.     CT scan of the abdomen pelvis revealed Bilateral renal calculi with severe right hydronephrosis secondary to 2 distal stones measuring 4 mm and 7 mm.     She is s/p CYSTOSCOPY, RIGHT DOUBLE J STENT PLACEMENT on 3/17/2024    The patient is seen at the bedside alert and oriented, ready to go home  Problem List:  Patient Active Problem List   Diagnosis    Diarrhea    Lower abdominal pain    Anemia    History of colonic polyps    Thyroid disorder    Blood in stool    Ureterolithiasis         Medications reviewed  Current Facility-Administered Medications   Medication Dose Route Frequency    atorvastatin (LIPITOR) tablet 40 mg  40 mg Oral Daily    busPIRone (BUSPAR) tablet 15 mg  15 mg Oral BID    donepezil (ARICEPT) tablet 10 mg  10 mg Oral QHS    levothyroxine (SYNTHROID) tablet 125 mcg  125 mcg Oral QAM AC    metoprolol tartrate (LOPRESSOR) tablet 25 mg  25 mg Oral BID    traZODone (DESYREL) tablet 100 mg  100 mg Oral Daily PRN    sodium chloride flush 0.9 % injection 5-40 mL  5-40 mL IntraVENous 2 times per day    sodium chloride flush 0.9 % injection 5-40 mL  5-40 mL IntraVENous PRN    0.9 % sodium chloride infusion   IntraVENous PRN    potassium chloride (KLOR-CON M) extended release tablet 40 mEq  40 mEq Oral PRN    Or    potassium bicarb-citric acid (EFFER-K) effervescent tablet 40 mEq  40 mEq Oral PRN    Or    potassium chloride 10 mEq/100 mL IVPB (Peripheral Line)  10 mEq IntraVENous PRN    magnesium sulfate 2000 mg in 50 mL IVPB premix  2,000 mg IntraVENous PRN    ondansetron (ZOFRAN-ODT) disintegrating tablet 4 mg  4 mg Oral Q8H PRN    Or    ondansetron (ZOFRAN) injection 4 mg  4 mg IntraVENous Q6H PRN    polyethylene glycol (GLYCOLAX) packet 17 g  17 g Oral Daily PRN    acetaminophen (TYLENOL) tablet 650 mg  650 mg Oral Q6H PRN    Or    acetaminophen  (TYLENOL) suppository 650 mg  650 mg Rectal Q6H PRN    0.9 % sodium chloride infusion   IntraVENous Continuous    cefTRIAXone (ROCEPHIN) 2,000 mg in sterile water 20 mL IV syringe  2,000 mg IntraVENous Q24H    HYDROmorphone HCl PF (DILAUDID) injection 0.5 mg  0.5 mg IntraVENous Q4H PRN       Review of Systems:   Review of Systems   All other systems reviewed and are negative.            Objective:   Physical Exam  Constitutional:       Appearance: Normal appearance.   HENT:      Mouth/Throat:      Mouth: Mucous membranes are moist.   Cardiovascular:      Pulses: Normal pulses.   Abdominal:      Palpations: Abdomen is soft.   Neurological:      Mental Status: Mental status is at baseline.          Vitals:    03/18/24 0845   BP: (!) 143/94   Pulse: 67   Resp:    Temp: 97.5 °F (36.4 °C)   SpO2: 100%         Data Review:       Recent Days:  Recent Labs     03/17/24  0152 03/18/24  0532   WBC 5.7 5.6   HGB 7.8* 7.5*   HCT 26.9* 26.5*    173     Recent Labs     03/17/24  0152 03/18/24  0532    141   K 3.7 3.6    113*   CO2 26 26   BUN 16 14   MG 1.7  --    ALT 11* 10*       24 Hour Results:  Recent Results (from the past 24 hour(s))   CBC with Auto Differential    Collection Time: 03/18/24  5:32 AM   Result Value Ref Range    WBC 5.6 3.6 - 11.0 K/uL    RBC 3.66 (L) 3.80 - 5.20 M/uL    Hemoglobin 7.5 (L) 11.5 - 16.0 g/dL    Hematocrit 26.5 (L) 35.0 - 47.0 %    MCV 72.4 (L) 80.0 - 99.0 FL    MCH 20.5 (L) 26.0 - 34.0 PG    MCHC 28.3 (L) 30.0 - 36.5 g/dL    RDW 22.0 (H) 11.5 - 14.5 %    Platelets 173 150 - 400 K/uL    MPV 10.4 8.9 - 12.9 FL    Nucleated RBCs 0.0 0.0  WBC    nRBC 0.00 0.00 - 0.01 K/uL    Neutrophils % 70 32 - 75 %    Lymphocytes % 21 12 - 49 %    Monocytes % 7 5 - 13 %    Eosinophils % 1 0 - 7 %    Basophils % 0 0 - 1 %    Immature Granulocytes 1 (H) 0 - 0.5 %    Neutrophils Absolute 4.0 1.8 - 8.0 K/UL    Lymphocytes Absolute 1.2 0.8 - 3.5 K/UL    Monocytes Absolute 0.4 0.0 - 1.0 K/UL

## 2024-03-18 NOTE — DISCHARGE SUMMARY
Physician Discharge Summary     Patient ID:    Jimena Sepulveda  [id number]  68 y.o.  1955    Admit date: 3/17/2024    Discharge date : 3/18/2024      Final Diagnoses:   Ureterolithiasis [N20.1]  Nephrolithiasis [N20.0]  Acute kidney injury, prerenal  Hypothyroidism  Right hydronephrosis  Hyperlipidemia  Hypertension  History of stroke  Chronic anemia    Reason for Hospitalization:  Jimena Sepulveda is a 68 y.o.  female with PMHx significant for blindness, history of stroke, hypothyroidism who presents to the emergency department at [facility] with a 4-day history of right flank pain.  CT scan of the abdomen pelvis revealed Bilateral renal calculi with severe right hydronephrosis secondary to 2 distal stones measuring 4 mm and 7 mm.  Patient was transferred to this facility for urology coverage.  Urology consult has been ordered.  Patient has not received antibiotics at this point although urinalysis was negative.  Due to the obstruction and possible proximal infection that cannot be measured from a urinalysis will initiate antibiotics.  Patient also reports nausea and vomiting.  Pain is controlled although severe when not receiving IV pain medications       Hospital Course:   Patient started on IV fluids as well as antibiotics.  She was seen by urology and taken for cystoscopy with stent placement on 3/17.    With hydration her renal function improved    Patient was for stable for discharge home on 3/18    Patient came from rehab and I am anticipating her being discharged back to SNF      Discharge Medications:      Medication List        CONTINUE taking these medications      aspirin 81 MG EC tablet     atorvastatin 40 MG tablet  Commonly known as: LIPITOR     busPIRone 15 MG tablet  Commonly known as: BUSPAR     donepezil 10 MG tablet  Commonly known as: ARICEPT     levothyroxine 150 MCG tablet  Commonly known as: SYNTHROID     metoprolol tartrate 25 MG tablet  Commonly known as:

## 2024-03-18 NOTE — CARE COORDINATION
ROBERT spoke with Admissions director, aMdy, at [facility].  She stated patient was with them under skilled days, and will need insurance auth to return.    She is going to submit auth with our MD notes, and their PT/OT notes today and send reference number once available.    Hopefully auth can be obtained tomorrow for patient to discharge.

## 2024-03-18 NOTE — PROGRESS NOTES
Discharge paperwork started. Just put in next dose due date. Print and send with patient when discharged.

## 2024-03-18 NOTE — PROGRESS NOTES
4 Eyes Skin Assessment     NAME:  Jimena Sepulveda  YOB: 1955  MEDICAL RECORD NUMBER:  [id number]    The patient is being assessed for  Admission    I agree that at least one RN has performed a thorough Head to Toe Skin Assessment on the patient. ALL assessment sites listed below have been assessed.      Areas assessed by both nurses:    Head, Face, Ears, Shoulders, Back, Chest, Arms, Elbows, Hands, Sacrum. Buttock, Coccyx, Ischium, Legs. Feet and Heels, and Under Medical Devices         Does the Patient have a Wound? No noted wound(s)       Jr Prevention initiated by RN: Yes  Wound Care Orders initiated by RN: No    Pressure Injury (Stage 3,4, Unstageable, DTI, NWPT, and Complex wounds) if present, place Wound referral order by RN under : No    New Ostomies, if present place, Ostomy referral order under : No     Nurse 1 eSignature: Electronically signed by Marcelle Mast RN on 3/17/24 at 11:51 PM EDT    **SHARE this note so that the co-signing nurse can place an eSignature**    Nurse 2 eSignature: Electronically signed by Patsy Morales RN on 3/17/24 at 11:52 PM EDT

## 2024-03-18 NOTE — PLAN OF CARE
Problem: Discharge Planning  Goal: Discharge to home or other facility with appropriate resources  Outcome: Progressing  Flowsheets (Taken 3/17/2024 2008)  Discharge to home or other facility with appropriate resources: Identify barriers to discharge with patient and caregiver     Problem: Chronic Conditions and Co-morbidities  Goal: Patient's chronic conditions and co-morbidity symptoms are monitored and maintained or improved  Outcome: Progressing  Flowsheets (Taken 3/17/2024 2008)  Care Plan - Patient's Chronic Conditions and Co-Morbidity Symptoms are Monitored and Maintained or Improved: Monitor and assess patient's chronic conditions and comorbid symptoms for stability, deterioration, or improvement     Problem: Pain  Goal: Verbalizes/displays adequate comfort level or baseline comfort level  Outcome: Progressing  Flowsheets (Taken 3/17/2024 2021)  Verbalizes/displays adequate comfort level or baseline comfort level: Encourage patient to monitor pain and request assistance     Problem: Safety - Adult  Goal: Free from fall injury  Outcome: Progressing     Problem: ABCDS Injury Assessment  Goal: Absence of physical injury  Outcome: Progressing

## 2024-03-18 NOTE — CARE COORDINATION
03/18/24 1439   Service Assessment   Patient Orientation Person   Cognition Alert   History Provided By Significant Other   Primary Caregiver Other (Comment)  ([facility]   Support Systems Spouse/Significant Other;Other (Comment)  ([facility])   Patient's Healthcare Decision Maker is: Legal Next of Kin   PCP Verified by CM   ([facility])   Prior Functional Level Assistance with the following:;Bathing;Dressing;Toileting;Feeding;Cooking;Housework;Shopping;Mobility;Other (see comment)  ([facility])   Current Functional Level Assistance with the following:;Bathing;Dressing;Toileting;Feeding;Cooking;Housework;Shopping;Mobility;Other (see comment)  ([facility])   Can patient return to prior living arrangement Yes   Ability to make needs known: Fair   Family able to assist with home care needs: Other (comment)  ([facility])   Would you like for me to discuss the discharge plan with any other family members/significant others, and if so, who? No   Financial Resources Medicaid   Community Resources None   Social/Functional History   Lives With Other (comment)  ([facility])   Type of Home   ([facility])   Discharge Planning   Type of Residence Long-Term Care   Living Arrangements Other (Comment)  ([facility])   Current Services Prior To Admission Skilled Nursing Facility   Potential Assistance Needed Skilled Nursing Facility   Patient expects to be discharged to: Long-term care   Services At/After Discharge   Mode of Transport at Discharge BLS   Confirm Follow Up Transport Other (see comment)  (Medical Transport)     CM assessment complete and demographics confirmed. Patient is LTC at [facility]. CM spoke with  and he would  like for patient to return there via medical transport.     Advance Care Planning     General Advance Care Planning (ACP) Conversation    Date of Conversation: 3/18/2024  Conducted with:  Healthcare Decision Maker: Next of Kin by law (only applies in absence of above) (name)     Healthcare Decision Maker:  No healthcare decision makers have been documented.  Click here to complete HealthCare Decision Makers including selection of the Healthcare Decision Maker Relationship (ie \"Primary\")   Today we documented Decision Maker(s) consistent with Legal Next of Kin hierarchy.    Content/Action Overview:  Has NO ACP documents-Information provided  Reviewed DNR/DNI and patient elects Full Code (Attempt Resuscitation)        Length of Voluntary ACP Conversation in minutes:  <16 minutes (Non-Billable)    Stephanie De La Garza

## 2024-03-19 LAB
ALBUMIN SERPL-MCNC: 2.8 G/DL (ref 3.5–5)
ALBUMIN/GLOB SERPL: 1.1 (ref 1.1–2.2)
ALP SERPL-CCNC: 65 U/L (ref 45–117)
ALT SERPL-CCNC: 9 U/L (ref 12–78)
ANION GAP SERPL CALC-SCNC: 0 MMOL/L (ref 5–15)
AST SERPL W P-5'-P-CCNC: 13 U/L (ref 15–37)
BASOPHILS # BLD: 0 K/UL (ref 0–0.1)
BASOPHILS NFR BLD: 0 % (ref 0–1)
BILIRUB SERPL-MCNC: 0.2 MG/DL (ref 0.2–1)
BUN SERPL-MCNC: 9 MG/DL (ref 6–20)
BUN/CREAT SERPL: 8 (ref 12–20)
CA-I BLD-MCNC: 8.1 MG/DL (ref 8.5–10.1)
CHLORIDE SERPL-SCNC: 117 MMOL/L (ref 97–108)
CO2 SERPL-SCNC: 28 MMOL/L (ref 21–32)
CREAT SERPL-MCNC: 1.08 MG/DL (ref 0.55–1.02)
DIFFERENTIAL METHOD BLD: ABNORMAL
EOSINOPHIL # BLD: 0.1 K/UL (ref 0–0.4)
EOSINOPHIL NFR BLD: 3 % (ref 0–7)
ERYTHROCYTE [DISTWIDTH] IN BLOOD BY AUTOMATED COUNT: 22.4 % (ref 11.5–14.5)
GLOBULIN SER CALC-MCNC: 2.6 G/DL (ref 2–4)
GLUCOSE SERPL-MCNC: 96 MG/DL (ref 65–100)
HCT VFR BLD AUTO: 25.7 % (ref 35–47)
HGB BLD-MCNC: 7.2 G/DL (ref 11.5–16)
IMM GRANULOCYTES # BLD AUTO: 0 K/UL (ref 0–0.04)
IMM GRANULOCYTES NFR BLD AUTO: 0 % (ref 0–0.5)
LYMPHOCYTES # BLD: 1.5 K/UL (ref 0.8–3.5)
LYMPHOCYTES NFR BLD: 32 % (ref 12–49)
MCH RBC QN AUTO: 20.5 PG (ref 26–34)
MCHC RBC AUTO-ENTMCNC: 28 G/DL (ref 30–36.5)
MCV RBC AUTO: 73 FL (ref 80–99)
MONOCYTES # BLD: 0.3 K/UL (ref 0–1)
MONOCYTES NFR BLD: 7 % (ref 5–13)
NEUTS SEG # BLD: 2.8 K/UL (ref 1.8–8)
NEUTS SEG NFR BLD: 58 % (ref 32–75)
NRBC # BLD: 0 K/UL (ref 0–0.01)
NRBC BLD-RTO: 0 PER 100 WBC
PLATELET # BLD AUTO: 157 K/UL (ref 150–400)
PMV BLD AUTO: 10 FL (ref 8.9–12.9)
POTASSIUM SERPL-SCNC: 3.7 MMOL/L (ref 3.5–5.1)
PROT SERPL-MCNC: 5.4 G/DL (ref 6.4–8.2)
RBC # BLD AUTO: 3.52 M/UL (ref 3.8–5.2)
RBC MORPH BLD: ABNORMAL
SODIUM SERPL-SCNC: 145 MMOL/L (ref 136–145)
WBC # BLD AUTO: 4.7 K/UL (ref 3.6–11)

## 2024-03-19 PROCEDURE — 2500000003 HC RX 250 WO HCPCS: Performed by: INTERNAL MEDICINE

## 2024-03-19 PROCEDURE — 2580000003 HC RX 258: Performed by: PHYSICIAN ASSISTANT

## 2024-03-19 PROCEDURE — 80053 COMPREHEN METABOLIC PANEL: CPT

## 2024-03-19 PROCEDURE — 6360000002 HC RX W HCPCS: Performed by: PHYSICIAN ASSISTANT

## 2024-03-19 PROCEDURE — 85025 COMPLETE CBC W/AUTO DIFF WBC: CPT

## 2024-03-19 PROCEDURE — 97530 THERAPEUTIC ACTIVITIES: CPT

## 2024-03-19 PROCEDURE — 6370000000 HC RX 637 (ALT 250 FOR IP): Performed by: PHYSICIAN ASSISTANT

## 2024-03-19 PROCEDURE — 1100000000 HC RM PRIVATE

## 2024-03-19 PROCEDURE — 97161 PT EVAL LOW COMPLEX 20 MIN: CPT

## 2024-03-19 PROCEDURE — 36415 COLL VENOUS BLD VENIPUNCTURE: CPT

## 2024-03-19 PROCEDURE — 6360000002 HC RX W HCPCS: Performed by: INTERNAL MEDICINE

## 2024-03-19 PROCEDURE — 97165 OT EVAL LOW COMPLEX 30 MIN: CPT

## 2024-03-19 PROCEDURE — 6370000000 HC RX 637 (ALT 250 FOR IP): Performed by: HOSPITALIST

## 2024-03-19 RX ORDER — HYDROMORPHONE HYDROCHLORIDE 1 MG/ML
1 INJECTION, SOLUTION INTRAMUSCULAR; INTRAVENOUS; SUBCUTANEOUS ONCE
Status: DISCONTINUED | OUTPATIENT
Start: 2024-03-19 | End: 2024-03-19 | Stop reason: SDUPTHER

## 2024-03-19 RX ORDER — ZIPRASIDONE MESYLATE 20 MG/ML
10 INJECTION, POWDER, LYOPHILIZED, FOR SOLUTION INTRAMUSCULAR EVERY 12 HOURS PRN
Status: DISCONTINUED | OUTPATIENT
Start: 2024-03-19 | End: 2024-03-21 | Stop reason: HOSPADM

## 2024-03-19 RX ORDER — HYDROMORPHONE HYDROCHLORIDE 1 MG/ML
1 INJECTION, SOLUTION INTRAMUSCULAR; INTRAVENOUS; SUBCUTANEOUS ONCE
Status: COMPLETED | OUTPATIENT
Start: 2024-03-19 | End: 2024-03-19

## 2024-03-19 RX ORDER — OXYCODONE HYDROCHLORIDE AND ACETAMINOPHEN 5; 325 MG/1; MG/1
1 TABLET ORAL EVERY 4 HOURS PRN
Status: DISCONTINUED | OUTPATIENT
Start: 2024-03-19 | End: 2024-03-21 | Stop reason: HOSPADM

## 2024-03-19 RX ORDER — HYDROMORPHONE HYDROCHLORIDE 1 MG/ML
0.5 INJECTION, SOLUTION INTRAMUSCULAR; INTRAVENOUS; SUBCUTANEOUS ONCE
Status: DISCONTINUED | OUTPATIENT
Start: 2024-03-19 | End: 2024-03-19 | Stop reason: SDUPTHER

## 2024-03-19 RX ADMIN — CEFTRIAXONE SODIUM 2000 MG: 2 INJECTION, POWDER, FOR SOLUTION INTRAMUSCULAR; INTRAVENOUS at 10:36

## 2024-03-19 RX ADMIN — OXYCODONE HYDROCHLORIDE AND ACETAMINOPHEN 1 TABLET: 5; 325 TABLET ORAL at 22:40

## 2024-03-19 RX ADMIN — SODIUM CHLORIDE, PRESERVATIVE FREE 10 ML: 5 INJECTION INTRAVENOUS at 20:26

## 2024-03-19 RX ADMIN — ZIPRASIDONE MESYLATE 10 MG: 20 INJECTION, POWDER, LYOPHILIZED, FOR SOLUTION INTRAMUSCULAR at 23:33

## 2024-03-19 RX ADMIN — DONEPEZIL HYDROCHLORIDE 10 MG: 5 TABLET, FILM COATED ORAL at 20:21

## 2024-03-19 RX ADMIN — METOPROLOL TARTRATE 25 MG: 25 TABLET, FILM COATED ORAL at 21:47

## 2024-03-19 RX ADMIN — LEVOTHYROXINE SODIUM 125 MCG: 0.03 TABLET ORAL at 06:53

## 2024-03-19 RX ADMIN — SODIUM CHLORIDE, PRESERVATIVE FREE 10 ML: 5 INJECTION INTRAVENOUS at 10:32

## 2024-03-19 RX ADMIN — BUSPIRONE HYDROCHLORIDE 15 MG: 10 TABLET ORAL at 20:21

## 2024-03-19 RX ADMIN — HYDROMORPHONE HYDROCHLORIDE 1 MG: 1 INJECTION, SOLUTION INTRAMUSCULAR; INTRAVENOUS; SUBCUTANEOUS at 12:05

## 2024-03-19 RX ADMIN — BUSPIRONE HYDROCHLORIDE 15 MG: 10 TABLET ORAL at 10:32

## 2024-03-19 RX ADMIN — ATORVASTATIN CALCIUM 40 MG: 40 TABLET, FILM COATED ORAL at 10:27

## 2024-03-19 RX ADMIN — METOPROLOL TARTRATE 25 MG: 25 TABLET, FILM COATED ORAL at 10:27

## 2024-03-19 ASSESSMENT — PAIN SCALES - GENERAL
PAINLEVEL_OUTOF10: 8
PAINLEVEL_OUTOF10: 8
PAINLEVEL_OUTOF10: 0

## 2024-03-19 ASSESSMENT — PAIN DESCRIPTION - ORIENTATION: ORIENTATION: UPPER

## 2024-03-19 ASSESSMENT — PAIN DESCRIPTION - LOCATION
LOCATION: ABDOMEN
LOCATION: ABDOMEN

## 2024-03-19 ASSESSMENT — PAIN DESCRIPTION - DESCRIPTORS: DESCRIPTORS: SPASM

## 2024-03-19 NOTE — DISCHARGE SUMMARY
have decision-making capacity.  I later learned from case management that she actually has been at the facility under long-term care and her  is not able to care for her at home      Discharge Medications:      Medication List        CONTINUE taking these medications      aspirin 81 MG EC tablet     atorvastatin 40 MG tablet  Commonly known as: LIPITOR     busPIRone 15 MG tablet  Commonly known as: BUSPAR     donepezil 10 MG tablet  Commonly known as: ARICEPT     levothyroxine 150 MCG tablet  Commonly known as: SYNTHROID     metoprolol tartrate 25 MG tablet  Commonly known as: LOPRESSOR     QUEtiapine 100 MG tablet  Commonly known as: SEROQUEL     sertraline 100 MG tablet  Commonly known as: ZOLOFT     traZODone 100 MG tablet  Commonly known as: DESYREL            STOP taking these medications      [facility] Anti-Diarrheal 2 MG tablet  Generic drug: loperamide     diclofenac sodium 1 % Gel  Commonly known as: VOLTAREN     dicyclomine 10 MG capsule  Commonly known as: BENTYL     metroNIDAZOLE 500 MG tablet  Commonly known as: FLAGYL     Myrbetriq 50 MG Tb24  Generic drug: mirabegron     potassium chloride 10 MEQ extended release tablet  Commonly known as: KLOR-CON     vitamin D 1.25 MG (88130 UT) Caps capsule  Commonly known as: ERGOCALCIFEROL                Follow up Care:    Follow-up Information       Follow up With Specialties Details Why Contact Info    Doc Diaz MD Urology Follow up in 1 week(s)  [address] [place] [id number] [phone]                   Diet:  regular diet    Disposition:  Home.    Advanced Directive:   FULL    DNR      Discharge Exam:  General:  Alert, cooperative, no distress, appears stated age.   Lungs:   Clear to auscultation bilaterally.   Chest wall:  No tenderness or deformity.   Heart:  Regular rate and rhythm, S1, S2 normal, no murmur, click, rub or gallop.   Abdomen:   Soft, non-tender. Bowel sounds normal. No masses,  No organomegaly.   Extremities:  hours.   Recent Labs     03/17/24  1126   IRON 21*   TIBC 356      No results for input(s): \"PH\", \"PCO2\", \"PO2\" in the last 72 hours.  No results for input(s): \"CKTOTAL\", \"CKMB\", \"TROPONINI\" in the last 72 hours.  No results found for: \"POCGLU\"      Discharge time spent 35 minutes    Signed:  Tavo Llanos MD  3/19/2024  7:10 AM

## 2024-03-19 NOTE — PLAN OF CARE
PHYSICAL THERAPY EVALUATION  Patient: Jimena Sepulveda (69 y.o. female)  Date: 3/19/2024  Primary Diagnosis: Ureterolithiasis [N20.1]  Nephrolithiasis [N20.0]  Procedure(s) (LRB):  CYSTOSCOPY, RIGHT DOUBLE J STENT PLACEMENT (Right) 2 Days Post-Op   Precautions: Fall Risk, Bed Alarm                      Recommendations for nursing mobility: Out of bed to chair for meals, Encourage HEP in prep for ADLs/mobility; see handout for details, Use of bed/chair alarm for safety, Use of BSC for toileting , AD and gt belt for bed to chair , and Assist x1    In place during session: Peripheral IV and External Catheter    ASSESSMENT  Pt is a 69 y.o. female admitted on 3/17/2024 for R flank pain; PMH blindness, CVA, hypothryroidism; pt currently being treated for ureterolithiasis . Pt semi supine upon PT arrival, agreeable to evaluation. Pt A&O x 1, oriented to self only.  Pt's spouse entered room mid session and remained in room with pt permission.    Based on the objective data described below, the patient currently presents with impaired functional mobility, decreased independence in ADLs, impaired ability to perform high-level IADLs, impaired strength, decreased activity tolerance, poor safety awareness, impaired cognition, and impaired balance. (See below for objective details and assist levels).     Overall pt tolerated session fair today with no c/o pain throughout sesssion. Pt required CGA for bed mobility with verbal and manual cues for hand placement on bed rails. Pt required Valerio for transfers for walker stability, VC/TC for hand placement and sequencing. Pt amb 2 feet with RW, gt belt and Valerio; demonstrates decreased step clearance bilaterally, step-to gait pattern, requires manual A for walker mgmt and hand placement on arm rests for controlled descent, repeated VC for sequencing, no LOB or knee buckling noted. Pt will benefit from continued skilled PT to address above deficits and return to PLOF. Potential  of the bed?   [] 1   [] 2   [x] 3   [] 4          How much help from another person does the patient currently need... Total A Lot A Little None   4.  Moving to and from a bed to a chair (including a wheelchair)?   [] 1   [] 2   [x] 3   [] 4   5.  Need to walk in hospital room?   [] 1   [] 2   [x] 3   [] 4   6.  Climbing 3-5 steps with a railing?   [] 1   [x] 2   [] 3   [] 4   © , Trustees of [facility], under license to [employer].. All rights reserved     Score:  Initial:  Most Recent: X (Date: 3/19/2024 )   Interpretation of Tool:  Represents activities that are increasingly more difficult (i.e. Bed mobility, Transfers, Gait).  Score 24 23 22-20 19-15 14-10 9-7 6   Modifier CH CI CJ CK CL CM CN         Physical Therapy Evaluation Charge Determination   History Examination Presentation Decision-Making   HIGH Complexity :3+ comorbidities / personal factors will impact the outcome/ POC  HIGH Complexity : 4+ Standardized tests and measures addressing body structure, function, activity limitation and / or participation in recreation  LOW Complexity : Stable, uncomplicated  Other outcome measures ampac 6  MEDIUM      Based on the above components, the patient evaluation is determined to be of the following complexity level: LOW    Pain Ratin/10 none reported  Pain Intervention(s):       Activity Tolerance:   Fair     After treatment patient left in no apparent distress:   Bed locked and in lowest position Patient left in no apparent distress sitting up in chair, Call bell within reach, and Caregiver / family present and nsg updated.    COMMUNICATION/EDUCATION:   The patient’s plan of care was discussed with: Occupational therapist, Registered nurse, and Certified nursing assistant/patient care technician    Patient Education  Education Given To: Patient;Family  Education Provided: Role of Therapy;Plan of Care;Transfer Training  Education Method: Verbal  Barriers to Learning: Cognition  Education

## 2024-03-19 NOTE — TELEPHONE ENCOUNTER
Can we call the number bellow to schedule follow up appointment  for stone management with Dr. Diaz in 2 weeks. [facility] - [phone]

## 2024-03-19 NOTE — PLAN OF CARE
Problem: Discharge Planning  Goal: Discharge to home or other facility with appropriate resources  Outcome: Progressing     Problem: Chronic Conditions and Co-morbidities  Goal: Patient's chronic conditions and co-morbidity symptoms are monitored and maintained or improved  Outcome: Progressing     Problem: Pain  Goal: Verbalizes/displays adequate comfort level or baseline comfort level  Outcome: Progressing     Problem: Physical Therapy - Adult  Goal: By Discharge: Performs mobility at highest level of function for planned discharge setting.  See evaluation for individualized goals.  Description: FUNCTIONAL STATUS PRIOR TO ADMISSION: The patient  required minimal assistance for basic and instrumental ADLs.    HOME SUPPORT PRIOR TO ADMISSION: pt is a LT resident at [facility]    Physical Therapy Goals  Initiated 3/19/2024  Pt stated goal: to go home  Pt will be I with LE HEP in 7 days.  Pt will perform bed mobility with Modified [place] in 7 days.  Pt will perform transfers with Modified [place] in 7 days.   Pt will amb 25-50 feet with LRAD safely with Stand by Assist in 7 days.  Pt will demonstrate improvement in standing balance from fair to good in 7 days.     3/19/2024 1016 by Ina Briscoe PT  Outcome: Progressing     Problem: Occupational Therapy - Adult  Goal: By Discharge: Performs self-care activities at highest level of function for planned discharge setting.  See evaluation for individualized goals.  Description: FUNCTIONAL STATUS PRIOR TO ADMISSION: The patient required minimal assistance for basic and instrumental ADLs.    HOME SUPPORT PRIOR TO ADMISSION: pt is a [facility] resident at [facility]       Occupational Therapy Goals:  Initiated 3/19/2024  Patient/Family stated goal: Walk to the bathroom.  1.  Patient will perform lower body dressing with Modified [place] within 7 day(s).  2.  Patient will perform grooming with Minimal Assist within 7 day(s).  3.  Patient

## 2024-03-19 NOTE — PLAN OF CARE
OCCUPATIONAL THERAPY EVALUATION  Patient: Jimena Sepulveda (69 y.o. female)  Date: 3/19/2024  Primary Diagnosis: Ureterolithiasis [N20.1]  Nephrolithiasis [N20.0]  Procedure(s) (LRB):  CYSTOSCOPY, RIGHT DOUBLE J STENT PLACEMENT (Right) 2 Days Post-Op   Precautions: Fall Risk, Bed Alarm                Recommendations for nursing mobility: Out of bed to chair for meals, Encourage HEP in prep for ADLs/mobility; see handout for details, Frequent repositioning to prevent skin breakdown, Use of bed/chair alarm for safety, Use of BSC for toileting , AD and gt belt for bed to chair , and Assist x1    In place during session:EKG/telemetry   ASSESSMENT  Pt is a 69 y.o. female presenting to [facility] with c/o R flank pain, admitted 3/17/24 and currently being treated for ureterolithiasis. Pt received seated in chair upon arrival, AXO x person and time only, and agreeable to OT evaluation.     Based on current observations, pt presents with decreased  functional mobility, independence in ADLs, high-level IADLs, strength, body mechanics, activity tolerance, safety awareness, cognition, command following, attention/concentration, balance, vision/visual deficit, posture (see below for objective details and assist levels).     Overall, pt tolerates session fair with no c/o pain. OOB functional mobility completed with min A, RW, and verbal/tactile/manual cuing for hand placement, proximity of RW, and obstacle avoidance/navigation. Pt req'd mod A for LB dressing while seated in chair. Pt appeared to lean to R side while seated in chair, req'ing VC to correct posture. A blanket was wedged to pt's R side to assist with maintaining proper seated positioning. Pt educated on UE HEP (wrist, digit, elbow flex/ext) and to complete throughout the day to increase strength/endurance req'd for ADLs and functional transfers/mobility. Pt demo'd understanding. Pt will benefit from continued skilled OT services to address current impairments and  Seated grooming    Frequency/Duration: Patient will be followed by occupational therapy:  2-3x/week to address goals.    Recommendation for discharge: (in order for the patient to meet his/her long term goals)  Skilled Nursing Facility    IF patient discharges home will need the following DME: continuing to assess with progress     SUBJECTIVE:   Patient stated “I do what I can.”    OBJECTIVE DATA SUMMARY:     Past Medical History:   Diagnosis Date    Anemia     Blind     Blood in stool 6/2/2023    Diarrhea     History of colonic polyps 03/08/2023    [facility]    Lower abdominal pain     Stroke (HCC) 2018    Thyroid disorder      Past Surgical History:   Procedure Laterality Date    COLONOSCOPY N/A     [facility]   colon polyps    COLONOSCOPY N/A 6/8/2023    COLONOSCOPY WITH HOT SNARE POLYPECTOMY  performed by Jh Pelayo Jr., MD at [facility] ENDOSCOPY    CYSTOSCOPY Right 3/17/2024    CYSTOSCOPY, RIGHT DOUBLE J STENT PLACEMENT performed by Doc Diaz MD at [facility] MAIN OR    HYSTERECTOMY, TOTAL ABDOMINAL (CERVIX REMOVED)      ORTHOPEDIC SURGERY  1980    back surgery    TONSILLECTOMY N/A         Expanded or extensive additional review of patient history:   Lives With: Other (comment) (LTC at [facility])  Type of Home: Facility                          Home Equipment: Walker, rolling  Has the patient had two or more falls in the past year or any fall with injury in the past year?: No  Social/Functional History  Lives With: Other (comment) ([facility])  Type of Home: Facility  Home Equipment: Walker, rolling  Has the patient had two or more falls in the past year or any fall with injury in the past year?: No  Receives Help From:  (facility staff)  ADL Assistance: Needs assistance  Toileting: Needs assistance  Homemaking Assistance: Needs assistance  Ambulation Assistance: Needs assistance  Transfer Assistance: Needs assistance    Hand Dominance: right     EXAMINATION OF

## 2024-03-19 NOTE — CARE COORDINATION
1006: CM noted discharge order.  Patient is still pending authorization to [facility].  CM spoke with [facility] this morning.  Auth is still pending.  Auth reference #[id number].    CM will continue to follow up.    1242: ROBERT received correction to patient's auth reference number from [facility].  New Reference number is [id number].

## 2024-03-20 LAB
ALBUMIN SERPL-MCNC: 2.8 G/DL (ref 3.5–5)
ALBUMIN/GLOB SERPL: 1.1 (ref 1.1–2.2)
ALP SERPL-CCNC: 70 U/L (ref 45–117)
ALT SERPL-CCNC: 11 U/L (ref 12–78)
ANION GAP SERPL CALC-SCNC: 4 MMOL/L (ref 5–15)
AST SERPL W P-5'-P-CCNC: 17 U/L (ref 15–37)
BASOPHILS # BLD: 0.1 K/UL (ref 0–0.1)
BASOPHILS NFR BLD: 1 % (ref 0–1)
BILIRUB SERPL-MCNC: 0.2 MG/DL (ref 0.2–1)
BUN SERPL-MCNC: 7 MG/DL (ref 6–20)
BUN/CREAT SERPL: 8 (ref 12–20)
CA-I BLD-MCNC: 8 MG/DL (ref 8.5–10.1)
CHLORIDE SERPL-SCNC: 112 MMOL/L (ref 97–108)
CO2 SERPL-SCNC: 27 MMOL/L (ref 21–32)
CREAT SERPL-MCNC: 0.9 MG/DL (ref 0.55–1.02)
DIFFERENTIAL METHOD BLD: ABNORMAL
EOSINOPHIL # BLD: 0.2 K/UL (ref 0–0.4)
EOSINOPHIL NFR BLD: 4 % (ref 0–7)
ERYTHROCYTE [DISTWIDTH] IN BLOOD BY AUTOMATED COUNT: 23.1 % (ref 11.5–14.5)
GLOBULIN SER CALC-MCNC: 2.6 G/DL (ref 2–4)
GLUCOSE SERPL-MCNC: 126 MG/DL (ref 65–100)
HCT VFR BLD AUTO: 27.3 % (ref 35–47)
HGB BLD-MCNC: 8 G/DL (ref 11.5–16)
IMM GRANULOCYTES # BLD AUTO: 0 K/UL (ref 0–0.04)
IMM GRANULOCYTES NFR BLD AUTO: 0 % (ref 0–0.5)
LYMPHOCYTES # BLD: 1.3 K/UL (ref 0.8–3.5)
LYMPHOCYTES NFR BLD: 26 % (ref 12–49)
MCH RBC QN AUTO: 21.2 PG (ref 26–34)
MCHC RBC AUTO-ENTMCNC: 29.3 G/DL (ref 30–36.5)
MCV RBC AUTO: 72.4 FL (ref 80–99)
MONOCYTES # BLD: 0.5 K/UL (ref 0–1)
MONOCYTES NFR BLD: 9 % (ref 5–13)
NEUTS SEG # BLD: 2.9 K/UL (ref 1.8–8)
NEUTS SEG NFR BLD: 60 % (ref 32–75)
NRBC # BLD: 0 K/UL (ref 0–0.01)
NRBC BLD-RTO: 0 PER 100 WBC
PLATELET # BLD AUTO: 162 K/UL (ref 150–400)
POTASSIUM SERPL-SCNC: 3.4 MMOL/L (ref 3.5–5.1)
PROT SERPL-MCNC: 5.4 G/DL (ref 6.4–8.2)
RBC # BLD AUTO: 3.77 M/UL (ref 3.8–5.2)
RBC MORPH BLD: ABNORMAL
SODIUM SERPL-SCNC: 143 MMOL/L (ref 136–145)
WBC # BLD AUTO: 5 K/UL (ref 3.6–11)

## 2024-03-20 PROCEDURE — 36415 COLL VENOUS BLD VENIPUNCTURE: CPT

## 2024-03-20 PROCEDURE — 1100000000 HC RM PRIVATE

## 2024-03-20 PROCEDURE — 6370000000 HC RX 637 (ALT 250 FOR IP): Performed by: PHYSICIAN ASSISTANT

## 2024-03-20 PROCEDURE — 6360000002 HC RX W HCPCS: Performed by: PHYSICIAN ASSISTANT

## 2024-03-20 PROCEDURE — 80053 COMPREHEN METABOLIC PANEL: CPT

## 2024-03-20 PROCEDURE — 85025 COMPLETE CBC W/AUTO DIFF WBC: CPT

## 2024-03-20 PROCEDURE — 2580000003 HC RX 258: Performed by: PHYSICIAN ASSISTANT

## 2024-03-20 PROCEDURE — 6370000000 HC RX 637 (ALT 250 FOR IP): Performed by: HOSPITALIST

## 2024-03-20 RX ADMIN — BUSPIRONE HYDROCHLORIDE 15 MG: 10 TABLET ORAL at 11:44

## 2024-03-20 RX ADMIN — ATORVASTATIN CALCIUM 40 MG: 40 TABLET, FILM COATED ORAL at 11:44

## 2024-03-20 RX ADMIN — METOPROLOL TARTRATE 25 MG: 25 TABLET, FILM COATED ORAL at 11:44

## 2024-03-20 RX ADMIN — BUSPIRONE HYDROCHLORIDE 15 MG: 10 TABLET ORAL at 20:19

## 2024-03-20 RX ADMIN — ONDANSETRON 4 MG: 2 INJECTION INTRAMUSCULAR; INTRAVENOUS at 12:07

## 2024-03-20 RX ADMIN — METOPROLOL TARTRATE 25 MG: 25 TABLET, FILM COATED ORAL at 20:19

## 2024-03-20 RX ADMIN — SODIUM CHLORIDE, PRESERVATIVE FREE 10 ML: 5 INJECTION INTRAVENOUS at 20:22

## 2024-03-20 RX ADMIN — DONEPEZIL HYDROCHLORIDE 10 MG: 5 TABLET, FILM COATED ORAL at 20:19

## 2024-03-20 RX ADMIN — OXYCODONE HYDROCHLORIDE AND ACETAMINOPHEN 1 TABLET: 5; 325 TABLET ORAL at 20:19

## 2024-03-20 RX ADMIN — SODIUM CHLORIDE, PRESERVATIVE FREE 10 ML: 5 INJECTION INTRAVENOUS at 11:48

## 2024-03-20 RX ADMIN — CEFTRIAXONE SODIUM 2000 MG: 2 INJECTION, POWDER, FOR SOLUTION INTRAMUSCULAR; INTRAVENOUS at 11:45

## 2024-03-20 RX ADMIN — OXYCODONE HYDROCHLORIDE AND ACETAMINOPHEN 1 TABLET: 5; 325 TABLET ORAL at 14:13

## 2024-03-20 RX ADMIN — LEVOTHYROXINE SODIUM 125 MCG: 0.03 TABLET ORAL at 05:46

## 2024-03-20 ASSESSMENT — PAIN DESCRIPTION - DESCRIPTORS
DESCRIPTORS: ACHING;DISCOMFORT
DESCRIPTORS: ACHING

## 2024-03-20 ASSESSMENT — PAIN DESCRIPTION - ORIENTATION: ORIENTATION: MID

## 2024-03-20 ASSESSMENT — PAIN SCALES - GENERAL
PAINLEVEL_OUTOF10: 6
PAINLEVEL_OUTOF10: 9
PAINLEVEL_OUTOF10: 3

## 2024-03-20 ASSESSMENT — PAIN DESCRIPTION - LOCATION
LOCATION: ABDOMEN
LOCATION: ABDOMEN

## 2024-03-20 NOTE — TELEPHONE ENCOUNTER
Can we please keep in touch with her  to make sure she has follow up appointment. He will be able to provide the number to the place that she is going to stay. Thanks

## 2024-03-20 NOTE — CARE COORDINATION
13:09PM Auth still pending per admissions coordinator.     BENJAMIN Bella  [phone]      11:31AM Message sent to SNF ([facility]) admissions re: status of auth.  Awaiting a response.      BENJAMIN Bella  [phone]

## 2024-03-20 NOTE — DISCHARGE SUMMARY
Physician Discharge Summary     Patient ID:    Jimena Sepulveda  [id number]  69 y.o.  1955    Admit date: 3/17/2024    Discharge date : 3/20/2024      Final Diagnoses:   Ureterolithiasis [N20.1]  Nephrolithiasis [N20.0]  Acute kidney injury, prerenal  Hypothyroidism  Right hydronephrosis  Hyperlipidemia  Hypertension  History of stroke  Chronic anemia  Dementia    Reason for Hospitalization:  Jimena Sepulveda is a 68 y.o.  female with PMHx significant for blindness, history of stroke, hypothyroidism who presents to the emergency department at [facility] with a 4-day history of right flank pain.  CT scan of the abdomen pelvis revealed Bilateral renal calculi with severe right hydronephrosis secondary to 2 distal stones measuring 4 mm and 7 mm.  Patient was transferred to this facility for urology coverage.  Urology consult has been ordered.  Patient has not received antibiotics at this point although urinalysis was negative.  Due to the obstruction and possible proximal infection that cannot be measured from a urinalysis will initiate antibiotics.  Patient also reports nausea and vomiting.  Pain is controlled although severe when not receiving IV pain medications. Patient remains clinically stable for discharge.        Hospital Course:   Patient started on IV fluids as well as antibiotics.  She was seen by urology and taken for cystoscopy with stent placement on 3/17.    With hydration her renal function improved    Patient was for stable for discharge home on 3/18    Patient came from rehab and I am anticipating her being discharged back to SNF    3/19 patient unable to be discharged yesterday, requires insurance authorization which we are hoping to get today.  Patient remains appropriate for discharge    Patient is actually insistent on going home back home instead of the nursing facility.  Unclear if her family would support this or not but she says it is not up to them.  Patient        No results for input(s): \"INR\", \"PROTIME\", \"APTT\" in the last 72 hours.   No results for input(s): \"IRON\", \"TIBC\", \"FERR\" in the last 72 hours.    Invalid input(s): \"PSAT\"     No results for input(s): \"PH\", \"PCO2\", \"PO2\" in the last 72 hours.  No results for input(s): \"CKTOTAL\", \"CKMB\", \"TROPONINI\" in the last 72 hours.  No results found for: \"POCGLU\"      Discharge time spent 35 minutes    Signed:  JILLIAN Masters - NP  3/20/2024  12:33 PM   No

## 2024-03-20 NOTE — PROGRESS NOTES
4 Eyes Skin Assessment     NAME:  Jimena Sepulveda  YOB: 1955  MEDICAL RECORD NUMBER:  [id number]    The patient is being assessed for  Other weekly assessment    I agree that at least one RN has performed a thorough Head to Toe Skin Assessment on the patient. ALL assessment sites listed below have been assessed.      Areas assessed by both nurses:    Head, Face, Ears, Shoulders, Back, Chest, Arms, Elbows, Hands, Sacrum. Buttock, Coccyx, Ischium, Legs. Feet and Heels, and Under Medical Devices         Does the Patient have a Wound? No noted wound(s)       Jr Prevention initiated by RN: Yes  Wound Care Orders initiated by RN: No    Pressure Injury (Stage 3,4, Unstageable, DTI, NWPT, and Complex wounds) if present, place Wound referral order by RN under : No    New Ostomies, if present place, Ostomy referral order under : No     Nurse 1 eSignature: Electronically signed by RENNY MCCARTY RN on 3/20/24 at 1:08 AM EDT    **SHARE this note so that the co-signing nurse can place an eSignature**    Nurse 2 eSignature: Electronically signed by Kymberly Ambrocio RN on 3/20/24 at 1:18 AM EDT

## 2024-03-21 VITALS
SYSTOLIC BLOOD PRESSURE: 121 MMHG | OXYGEN SATURATION: 100 % | TEMPERATURE: 97.8 F | HEART RATE: 80 BPM | DIASTOLIC BLOOD PRESSURE: 76 MMHG | RESPIRATION RATE: 20 BRPM

## 2024-03-21 LAB
ALBUMIN SERPL-MCNC: 3 G/DL (ref 3.5–5)
ALBUMIN/GLOB SERPL: 1.1 (ref 1.1–2.2)
ALP SERPL-CCNC: 74 U/L (ref 45–117)
ALT SERPL-CCNC: 10 U/L (ref 12–78)
ANION GAP SERPL CALC-SCNC: 2 MMOL/L (ref 5–15)
AST SERPL W P-5'-P-CCNC: 14 U/L (ref 15–37)
BASOPHILS # BLD: 0.1 K/UL (ref 0–0.1)
BASOPHILS NFR BLD: 1 % (ref 0–1)
BILIRUB SERPL-MCNC: 0.3 MG/DL (ref 0.2–1)
BUN SERPL-MCNC: 8 MG/DL (ref 6–20)
BUN/CREAT SERPL: 8 (ref 12–20)
CA-I BLD-MCNC: 8.3 MG/DL (ref 8.5–10.1)
CHLORIDE SERPL-SCNC: 110 MMOL/L (ref 97–108)
CO2 SERPL-SCNC: 31 MMOL/L (ref 21–32)
CREAT SERPL-MCNC: 0.96 MG/DL (ref 0.55–1.02)
DIFFERENTIAL METHOD BLD: ABNORMAL
EOSINOPHIL # BLD: 0.2 K/UL (ref 0–0.4)
EOSINOPHIL NFR BLD: 3 % (ref 0–7)
ERYTHROCYTE [DISTWIDTH] IN BLOOD BY AUTOMATED COUNT: 22.6 % (ref 11.5–14.5)
GLOBULIN SER CALC-MCNC: 2.7 G/DL (ref 2–4)
GLUCOSE SERPL-MCNC: 130 MG/DL (ref 65–100)
HCT VFR BLD AUTO: 27.7 % (ref 35–47)
HGB BLD-MCNC: 7.9 G/DL (ref 11.5–16)
IMM GRANULOCYTES # BLD AUTO: 0 K/UL
IMM GRANULOCYTES NFR BLD AUTO: 0 %
LYMPHOCYTES # BLD: 1.6 K/UL (ref 0.8–3.5)
LYMPHOCYTES NFR BLD: 29 % (ref 12–49)
MCH RBC QN AUTO: 20.6 PG (ref 26–34)
MCHC RBC AUTO-ENTMCNC: 28.5 G/DL (ref 30–36.5)
MCV RBC AUTO: 72.1 FL (ref 80–99)
MONOCYTES # BLD: 0.4 K/UL (ref 0–1)
MONOCYTES NFR BLD: 7 % (ref 5–13)
NEUTS SEG # BLD: 3.2 K/UL (ref 1.8–8)
NEUTS SEG NFR BLD: 60 % (ref 32–75)
NRBC # BLD: 0 K/UL (ref 0–0.01)
NRBC BLD-RTO: 0 PER 100 WBC
PLATELET # BLD AUTO: 181 K/UL (ref 150–400)
POTASSIUM SERPL-SCNC: 3.6 MMOL/L (ref 3.5–5.1)
PROT SERPL-MCNC: 5.7 G/DL (ref 6.4–8.2)
RBC # BLD AUTO: 3.84 M/UL (ref 3.8–5.2)
RBC MORPH BLD: ABNORMAL
SODIUM SERPL-SCNC: 143 MMOL/L (ref 136–145)
WBC # BLD AUTO: 5.5 K/UL (ref 3.6–11)

## 2024-03-21 PROCEDURE — 6370000000 HC RX 637 (ALT 250 FOR IP): Performed by: HOSPITALIST

## 2024-03-21 PROCEDURE — 85025 COMPLETE CBC W/AUTO DIFF WBC: CPT

## 2024-03-21 PROCEDURE — 36415 COLL VENOUS BLD VENIPUNCTURE: CPT

## 2024-03-21 PROCEDURE — 2580000003 HC RX 258: Performed by: PHYSICIAN ASSISTANT

## 2024-03-21 PROCEDURE — 80053 COMPREHEN METABOLIC PANEL: CPT

## 2024-03-21 PROCEDURE — 6370000000 HC RX 637 (ALT 250 FOR IP): Performed by: NURSE PRACTITIONER

## 2024-03-21 PROCEDURE — 6360000002 HC RX W HCPCS: Performed by: PHYSICIAN ASSISTANT

## 2024-03-21 PROCEDURE — 6370000000 HC RX 637 (ALT 250 FOR IP): Performed by: PHYSICIAN ASSISTANT

## 2024-03-21 RX ORDER — HYDROXYZINE PAMOATE 50 MG/1
50 CAPSULE ORAL
Status: COMPLETED | OUTPATIENT
Start: 2024-03-21 | End: 2024-03-21

## 2024-03-21 RX ADMIN — CEFTRIAXONE SODIUM 2000 MG: 2 INJECTION, POWDER, FOR SOLUTION INTRAMUSCULAR; INTRAVENOUS at 09:38

## 2024-03-21 RX ADMIN — LEVOTHYROXINE SODIUM 125 MCG: 0.03 TABLET ORAL at 06:49

## 2024-03-21 RX ADMIN — HYDROXYZINE PAMOATE 50 MG: 50 CAPSULE ORAL at 10:40

## 2024-03-21 RX ADMIN — BUSPIRONE HYDROCHLORIDE 15 MG: 10 TABLET ORAL at 09:38

## 2024-03-21 RX ADMIN — OXYCODONE HYDROCHLORIDE AND ACETAMINOPHEN 1 TABLET: 5; 325 TABLET ORAL at 10:40

## 2024-03-21 RX ADMIN — SODIUM CHLORIDE, PRESERVATIVE FREE 10 ML: 5 INJECTION INTRAVENOUS at 09:39

## 2024-03-21 RX ADMIN — ATORVASTATIN CALCIUM 40 MG: 40 TABLET, FILM COATED ORAL at 09:38

## 2024-03-21 RX ADMIN — OXYCODONE HYDROCHLORIDE AND ACETAMINOPHEN 1 TABLET: 5; 325 TABLET ORAL at 02:27

## 2024-03-21 RX ADMIN — METOPROLOL TARTRATE 25 MG: 25 TABLET, FILM COATED ORAL at 09:38

## 2024-03-21 ASSESSMENT — PAIN SCALES - GENERAL
PAINLEVEL_OUTOF10: 8
PAINLEVEL_OUTOF10: 3
PAINLEVEL_OUTOF10: 2

## 2024-03-21 ASSESSMENT — PAIN DESCRIPTION - LOCATION: LOCATION: ABDOMEN

## 2024-03-21 ASSESSMENT — PAIN DESCRIPTION - ORIENTATION: ORIENTATION: MID

## 2024-03-21 ASSESSMENT — PAIN DESCRIPTION - DESCRIPTORS: DESCRIPTORS: ACHING

## 2024-03-21 NOTE — DISCHARGE INSTR - COC
Continuity of Care Form    Patient Name: Jimena Sepulveda   :  1955  MRN:  [id number]    Admit date:  3/17/2024  Discharge date:  3/21/2024    Code Status Order: Full Code   Advance Directives:     Admitting Physician:  Rodney Hernandez MD  PCP: Bertha Contreras    Discharging Nurse: Sobeida Iyer  Discharging Hospital Unit/Room#: 403/01  Discharging Unit Phone Number: [phone]    Emergency Contact:   Extended Emergency Contact Information  Primary Emergency Contact: Vignesh Sepulveda  Home Phone: [phone]  Mobile Phone: [phone]  Relation: Spouse  Secondary Emergency Contact: Kylee Gilliam  Home Phone: [phone]  Mobile Phone: [phone]  Relation: Other Relative    Past Surgical History:  Past Surgical History:   Procedure Laterality Date    COLONOSCOPY N/A     2023 at [facility]   colon polyps    COLONOSCOPY N/A 2023    COLONOSCOPY WITH HOT SNARE POLYPECTOMY  performed by Jh Pelayo Jr., MD at [facility] ENDOSCOPY    CYSTOSCOPY Right 3/17/2024    CYSTOSCOPY, RIGHT DOUBLE J STENT PLACEMENT performed by Doc Diaz MD at [facility] MAIN OR    HYSTERECTOMY, TOTAL ABDOMINAL (CERVIX REMOVED)      ORTHOPEDIC SURGERY      back surgery    TONSILLECTOMY N/A        Immunization History:     There is no immunization history on file for this patient.    Active Problems:  Patient Active Problem List   Diagnosis Code    Diarrhea R19.7    Lower abdominal pain R10.30    Anemia D64.9    History of colonic polyps Z86.010    Thyroid disorder E07.9    Blood in stool K92.1    Ureterolithiasis N20.1       Isolation/Infection:   Isolation            No Isolation          Patient Infection Status       None to display            Nurse Assessment:  Last Vital Signs: /76   Pulse 80   Temp 97.8 °F (36.6 °C) (Oral)   Resp 20   SpO2 100%     Last documented pain score (0-10 scale): Pain Level: 2  Last Weight:   Wt Readings from Last 1 Encounters:   24 89 kg (196 lb 3.2 oz)     Mental Status:  alert

## 2024-03-21 NOTE — PROGRESS NOTES
Called report to [facility] and spoke with MIAH Velázquez. SBAR given in report along with recent vitals and labs. Unit number given for possible future questions.

## 2024-03-21 NOTE — DISCHARGE SUMMARY
does have underlying dementia and does not appear to have decision-making capacity.  I later learned from case management that she actually has been at the facility under long-term care and her  is not able to care for her at home    3/20: Patient remains clinically stable for discharge.       Discharge Medications:      Medication List        CONTINUE taking these medications      aspirin 81 MG EC tablet     atorvastatin 40 MG tablet  Commonly known as: LIPITOR     busPIRone 15 MG tablet  Commonly known as: BUSPAR     donepezil 10 MG tablet  Commonly known as: ARICEPT     levothyroxine 150 MCG tablet  Commonly known as: SYNTHROID     metoprolol tartrate 25 MG tablet  Commonly known as: LOPRESSOR     QUEtiapine 100 MG tablet  Commonly known as: SEROQUEL     sertraline 100 MG tablet  Commonly known as: ZOLOFT     traZODone 100 MG tablet  Commonly known as: DESYREL            STOP taking these medications      [facility] Anti-Diarrheal 2 MG tablet  Generic drug: loperamide     diclofenac sodium 1 % Gel  Commonly known as: VOLTAREN     dicyclomine 10 MG capsule  Commonly known as: BENTYL     metroNIDAZOLE 500 MG tablet  Commonly known as: FLAGYL     Myrbetriq 50 MG Tb24  Generic drug: mirabegron     potassium chloride 10 MEQ extended release tablet  Commonly known as: KLOR-CON     vitamin D 1.25 MG (25670 UT) Caps capsule  Commonly known as: ERGOCALCIFEROL                Follow up Care:    Follow-up Information       Follow up With Specialties Details Why Contact Info    Doc Diaz MD Urology Follow up in 1 week(s)  [address] [place] [id number] [phone]                   Diet:  regular diet    Disposition:  Home.    Advanced Directive:   FULL    DNR      Discharge Exam:  General:  Alert, cooperative, no distress, appears stated age.   Lungs:   Clear to auscultation bilaterally.   Chest wall:  No tenderness or deformity.   Heart:  Regular rate and rhythm, S1, S2 normal, no murmur, click,  input(s): \"IRON\", \"TIBC\", \"FERR\" in the last 72 hours.    Invalid input(s): \"PSAT\"     No results for input(s): \"PH\", \"PCO2\", \"PO2\" in the last 72 hours.  No results for input(s): \"CKTOTAL\", \"CKMB\", \"TROPONINI\" in the last 72 hours.  No results found for: \"POCGLU\"      Discharge time spent 35 minutes    Signed:  Josefina Prather, JILLIAN - NP  3/21/2024  1:13 PM

## 2024-03-21 NOTE — CARE COORDINATION
Transition of Care Plan:    RUR:   Prior Level of Functioning:   Disposition: LTC. [facility].  Room [address] [facility].  RN to call report @ [phone]   If SNF or IPR: Date FOC offered: 18 MAR 24  Date FOC received: 18 MAR 24  Accepting facility: [facility].  Date authorization started with reference number: [id number] new ref#   Date authorization received and expires: Received 21 MAR 24  Follow up appointments: PCP and all recommended specialists  DME needed: N/A  Transportation at discharge:   IM/IMM Medicare/ letter given: Medicare pt has received, reviewed, and signed 2nd IM letter informing them of their right to appeal the discharge.  Signed copy has been placed on pt bedside chart.  Is patient a  and connected with [facility]? N/A  If yes, was  transfer form completed and [facility] notified? N/A  Caregiver Contact: Spouse  Discharge Caregiver contacted prior to discharge?   Care Conference needed?   Barriers to discharge:     10:21AM Outbound call to spouse no answer and voice message left w/direct contact information.  Nature of the call re: to inform of d/c back to LTC.    BENJAMIN Bella  [phone]      09:07AM Auth still pending.  Message sent to [facility]) admissions re: status of auth.  Awaiting a response.       BENJAMIN Bella  [phone]

## 2024-03-21 NOTE — PROGRESS NOTES
Attempted PT 1020 however nursing requesting to hold at this time as pt is agitated. Will continue to follow and attempt when appropriate. Thank you.

## 2024-04-01 ENCOUNTER — TELEPHONE (OUTPATIENT)
Age: 69
End: 2024-04-01

## 2024-04-01 ENCOUNTER — OFFICE VISIT (OUTPATIENT)
Age: 69
End: 2024-04-01
Payer: MEDICARE

## 2024-04-01 ENCOUNTER — PREP FOR PROCEDURE (OUTPATIENT)
Age: 69
End: 2024-04-01

## 2024-04-01 VITALS — HEART RATE: 64 BPM | DIASTOLIC BLOOD PRESSURE: 74 MMHG | SYSTOLIC BLOOD PRESSURE: 156 MMHG

## 2024-04-01 DIAGNOSIS — G30.9 MILD ALZHEIMER'S DEMENTIA, UNSPECIFIED TIMING OF DEMENTIA ONSET, UNSPECIFIED WHETHER BEHAVIORAL, PSYCHOTIC, OR MOOD DISTURBANCE OR ANXIETY (HCC): ICD-10-CM

## 2024-04-01 DIAGNOSIS — H54.0X55 CATEGORY 5 BLINDNESS OF BOTH EYES: ICD-10-CM

## 2024-04-01 DIAGNOSIS — N20.1 URETEROLITHIASIS: Primary | ICD-10-CM

## 2024-04-01 DIAGNOSIS — Z96.0 RETAINED URETERAL STENT: ICD-10-CM

## 2024-04-01 DIAGNOSIS — N20.1 URETEROLITHIASIS: ICD-10-CM

## 2024-04-01 DIAGNOSIS — F02.A0 MILD ALZHEIMER'S DEMENTIA, UNSPECIFIED TIMING OF DEMENTIA ONSET, UNSPECIFIED WHETHER BEHAVIORAL, PSYCHOTIC, OR MOOD DISTURBANCE OR ANXIETY (HCC): ICD-10-CM

## 2024-04-01 DIAGNOSIS — N20.1 RIGHT URETERAL STONE: ICD-10-CM

## 2024-04-01 DIAGNOSIS — N20.0 RIGHT RENAL STONE: ICD-10-CM

## 2024-04-01 PROCEDURE — 1123F ACP DISCUSS/DSCN MKR DOCD: CPT | Performed by: UROLOGY

## 2024-04-01 PROCEDURE — 99204 OFFICE O/P NEW MOD 45 MIN: CPT | Performed by: UROLOGY

## 2024-04-01 NOTE — TELEPHONE ENCOUNTER
Tried several times to contact facility ([facility]) only to reach a busy signal each time. S/w patients  who will try to reach facility as well & have them contact our office to get procedure scheduled.

## 2024-04-01 NOTE — PROGRESS NOTES
HISTORY OF PRESENT ILLNESS  Jimena Sepulveda is a 69 y.o. female   Patient with multiple stones left and right side.  She has multiple stones on the right side and I put a stent up prevent her obstruction.  Will set her up for ureteroscopy with laser the stones and stent placement after removing a primary stent bleeding infection injury to kidney ureter vascular injury pulm embolus and death he is aware of alternatives she has no questions  1. Ureterolithiasis  Overview:  CT scan of the abdomen pelvis revealed Bilateral renal calculi with severe right hydronephrosis secondary to 2 distal stones measuring 4 mm and 7 mm.     She is s/p CYSTOSCOPY, RIGHT DOUBLE J STENT PLACEMENT on 3/17/2024   Follow up with DR. Diaz in 2  week to discuss stone management   Orders:  -     AMB POC URINALYSIS DIP STICK AUTO W/O MICRO  2. Retained ureteral stent  3. Right renal stone  4. Right ureteral stone  Overview:  CT scan of the abdomen pelvis revealed Bilateral renal calculi with severe right hydronephrosis secondary to 2 distal stones measuring 4 mm and 7 mm.     She is s/p CYSTOSCOPY, RIGHT DOUBLE J STENT PLACEMENT on 3/17/2024   Follow up with DR. Diaz in 2  week to discuss stone management   5. Mild Alzheimer's dementia, unspecified timing of dementia onset, unspecified whether behavioral, psychotic, or mood disturbance or anxiety (HCC)  6. Category 5 blindness of both eyes        PAST MEDICAL HISTORY  PMHx (including negatives):  has a past medical history of Anemia, Blind, Blood in stool, Diarrhea, History of colonic polyps, Lower abdominal pain, Stroke (HCC), and Thyroid disorder.   PSurgHx:  has a past surgical history that includes Colonoscopy (N/A); Hysterectomy, total abdominal; Tonsillectomy (N/A); orthopedic surgery (1980); Colonoscopy (N/A, 6/8/2023); and Cystoscopy (Right, 3/17/2024).  PSocHx:  reports that she has been smoking cigarettes. She has never been exposed to tobacco smoke. She has never used

## 2024-04-01 NOTE — PROGRESS NOTES
Chief Complaint   Patient presents with    Follow-up        BP (!) 156/74   Pulse 64      PHQ-9 score is    Negative      1. \"Have you been to the ER, urgent care clinic since your last visit?  Hospitalized since your last visit?\" No    2. \"Have you seen or consulted any other health care providers outside of the [facility] since your last visit?\" No     3. For patients aged 45-75: Has the patient had a colonoscopy / FIT/ Cologuard? Yes - no Care Gap present      If the patient is female:    4. For patients aged 40-74: Has the patient had a mammogram within the past 2 years? NA - based on age or sex      5. For patients aged 21-65: Has the patient had a pap smear? NA - based on age or sex

## 2024-04-02 PROBLEM — Z96.0 RETAINED URETERAL STENT: Status: ACTIVE | Noted: 2024-04-02

## 2024-04-02 PROBLEM — G30.9 MILD ALZHEIMER'S DEMENTIA (HCC): Status: ACTIVE | Noted: 2024-04-02

## 2024-04-02 PROBLEM — F02.A0 MILD ALZHEIMER'S DEMENTIA (HCC): Status: ACTIVE | Noted: 2024-04-02

## 2024-04-02 PROBLEM — H54.0X55: Status: ACTIVE | Noted: 2024-04-02

## 2024-04-02 PROBLEM — N20.0 RIGHT RENAL STONE: Status: ACTIVE | Noted: 2024-04-02

## 2024-04-04 PROBLEM — N20.1 URETEROLITHIASIS: Status: ACTIVE | Noted: 2024-04-01

## 2024-04-08 NOTE — DISCHARGE INSTRUCTIONS
URETEROSCOPY WITH LASER LITHOTRIPSY FOR THE TREATMENT OF KIDNEY STONES      Laser lithotripsy is a way to treat kidney stones. This treatment uses a laser to break kidney stones into tiny pieces.  There are no cuts or incisions made in your skin.  Your doctor performs the procedure by going through the urethra (tube that drains the urine from the bladder).      Once the stone is seen, a laser fiber is used to transmit energy that breaks up your kidney stone(s). The surgeon removes some pieces through the urethra with a small basket, and smaller pieces can be passed later with urination. The surgeon may also use a high-powered laser with high-frequency emissions that \"dust\" the stones into a fine powder. You can then pass the fine particles in your urine after surgery.           What You Need To Know About The Procedure:  Typically used for stones that have not been passed successfully and are lodged in the ureter (the tube that drains urine from the kidney to the bladder).  Uses the energy from a laser to break up the stone(s)  It is sometimes more successful than the ESWL procedure (shockwave therapy)  It creates minimal stone fragments to pass  Usually performed in an outpatient setting (no hospital stay)    What you may experience after the procedure:   Mild bladder irritation  Mild back or side (flank) pain with urination  Blood in urine  For several hours after the procedure you may have a burning feeling when you urinate. You may feel the urge to go even if you don't need to. This feeling should go away within a day    Ureteral stent:  Stents are temporary plastic tubes that are inserted into the ureter.  One end is in the kidney and the other in the bladder.  Their purpose is to keep the ureter open after treatment of a stone so that the kidney is able to drain urine.  Having the stent allows the edema (swelling) and inflammation present in the ureter to resolve after your treatment.  A stent is typically

## 2024-04-08 NOTE — PERIOP NOTE
Spoke with Emiliana, urology surgery scheduler, to clarify patient's surgery listing for surgery scheduled on 4-09-24. Notified Emiliana, that patient's office visit note from 4-01-24 states that patient is to also have a ureteral stent removal during surgery. Order clarification received that patient's surgery should also include ureteral stent exchange. Will update patient's chart/order.

## 2024-04-09 ENCOUNTER — ANESTHESIA EVENT (OUTPATIENT)
Facility: HOSPITAL | Age: 69
End: 2024-04-09
Payer: MEDICARE

## 2024-04-09 ENCOUNTER — ANESTHESIA (OUTPATIENT)
Facility: HOSPITAL | Age: 69
End: 2024-04-09
Payer: MEDICARE

## 2024-04-09 ENCOUNTER — APPOINTMENT (OUTPATIENT)
Facility: HOSPITAL | Age: 69
End: 2024-04-09
Attending: UROLOGY
Payer: MEDICARE

## 2024-04-09 ENCOUNTER — HOSPITAL ENCOUNTER (OUTPATIENT)
Facility: HOSPITAL | Age: 69
Discharge: OTHER INSTITUTION | End: 2024-04-09
Attending: UROLOGY | Admitting: UROLOGY
Payer: MEDICARE

## 2024-04-09 VITALS
BODY MASS INDEX: 33.46 KG/M2 | RESPIRATION RATE: 20 BRPM | HEIGHT: 64 IN | OXYGEN SATURATION: 99 % | TEMPERATURE: 97.5 F | SYSTOLIC BLOOD PRESSURE: 125 MMHG | WEIGHT: 196 LBS | HEART RATE: 90 BPM | DIASTOLIC BLOOD PRESSURE: 62 MMHG

## 2024-04-09 DIAGNOSIS — N20.1 URETEROLITHIASIS: ICD-10-CM

## 2024-04-09 PROCEDURE — 7100000011 HC PHASE II RECOVERY - ADDTL 15 MIN: Performed by: UROLOGY

## 2024-04-09 PROCEDURE — 76000 FLUOROSCOPY <1 HR PHYS/QHP: CPT

## 2024-04-09 PROCEDURE — 7100000001 HC PACU RECOVERY - ADDTL 15 MIN: Performed by: UROLOGY

## 2024-04-09 PROCEDURE — 3600000014 HC SURGERY LEVEL 4 ADDTL 15MIN: Performed by: UROLOGY

## 2024-04-09 PROCEDURE — 3700000000 HC ANESTHESIA ATTENDED CARE: Performed by: UROLOGY

## 2024-04-09 PROCEDURE — 6370000000 HC RX 637 (ALT 250 FOR IP): Performed by: ANESTHESIOLOGY

## 2024-04-09 PROCEDURE — 6360000002 HC RX W HCPCS: Performed by: NURSE ANESTHETIST, CERTIFIED REGISTERED

## 2024-04-09 PROCEDURE — C1769 GUIDE WIRE: HCPCS | Performed by: UROLOGY

## 2024-04-09 PROCEDURE — C2617 STENT, NON-COR, TEM W/O DEL: HCPCS | Performed by: UROLOGY

## 2024-04-09 PROCEDURE — 2720000010 HC SURG SUPPLY STERILE: Performed by: UROLOGY

## 2024-04-09 PROCEDURE — 2500000003 HC RX 250 WO HCPCS: Performed by: NURSE ANESTHETIST, CERTIFIED REGISTERED

## 2024-04-09 PROCEDURE — 82360 CALCULUS ASSAY QUANT: CPT

## 2024-04-09 PROCEDURE — 87086 URINE CULTURE/COLONY COUNT: CPT

## 2024-04-09 PROCEDURE — 2580000003 HC RX 258: Performed by: UROLOGY

## 2024-04-09 PROCEDURE — 2709999900 HC NON-CHARGEABLE SUPPLY: Performed by: UROLOGY

## 2024-04-09 PROCEDURE — 2580000003 HC RX 258: Performed by: NURSE ANESTHETIST, CERTIFIED REGISTERED

## 2024-04-09 PROCEDURE — 6360000002 HC RX W HCPCS: Performed by: UROLOGY

## 2024-04-09 PROCEDURE — 6370000000 HC RX 637 (ALT 250 FOR IP): Performed by: UROLOGY

## 2024-04-09 PROCEDURE — 6360000002 HC RX W HCPCS: Performed by: ANESTHESIOLOGY

## 2024-04-09 PROCEDURE — 7100000000 HC PACU RECOVERY - FIRST 15 MIN: Performed by: UROLOGY

## 2024-04-09 PROCEDURE — 3700000001 HC ADD 15 MINUTES (ANESTHESIA): Performed by: UROLOGY

## 2024-04-09 PROCEDURE — 7100000010 HC PHASE II RECOVERY - FIRST 15 MIN: Performed by: UROLOGY

## 2024-04-09 PROCEDURE — 3600000004 HC SURGERY LEVEL 4 BASE: Performed by: UROLOGY

## 2024-04-09 PROCEDURE — 52356 CYSTO/URETERO W/LITHOTRIPSY: CPT | Performed by: UROLOGY

## 2024-04-09 DEVICE — VARIABLE LENGTH URETERAL STENT
Type: IMPLANTABLE DEVICE | Site: URETER | Status: FUNCTIONAL
Brand: CONTOUR VL™

## 2024-04-09 RX ORDER — METOCLOPRAMIDE HYDROCHLORIDE 5 MG/ML
10 INJECTION INTRAMUSCULAR; INTRAVENOUS
Status: DISCONTINUED | OUTPATIENT
Start: 2024-04-09 | End: 2024-04-09 | Stop reason: HOSPADM

## 2024-04-09 RX ORDER — TROSPIUM CHLORIDE 20 MG/1
20 TABLET, FILM COATED ORAL 2 TIMES DAILY
Qty: 20 TABLET | Refills: 3 | Status: SHIPPED | OUTPATIENT
Start: 2024-04-09

## 2024-04-09 RX ORDER — FUROSEMIDE 10 MG/ML
INJECTION INTRAMUSCULAR; INTRAVENOUS PRN
Status: DISCONTINUED | OUTPATIENT
Start: 2024-04-09 | End: 2024-04-09 | Stop reason: SDUPTHER

## 2024-04-09 RX ORDER — DIVALPROEX SODIUM 125 MG/1
125 CAPSULE, COATED PELLETS ORAL 2 TIMES DAILY
COMMUNITY

## 2024-04-09 RX ORDER — DEXAMETHASONE SODIUM PHOSPHATE 4 MG/ML
INJECTION, SOLUTION INTRA-ARTICULAR; INTRALESIONAL; INTRAMUSCULAR; INTRAVENOUS; SOFT TISSUE PRN
Status: DISCONTINUED | OUTPATIENT
Start: 2024-04-09 | End: 2024-04-09 | Stop reason: SDUPTHER

## 2024-04-09 RX ORDER — SULFAMETHOXAZOLE AND TRIMETHOPRIM 800; 160 MG/1; MG/1
1 TABLET ORAL 2 TIMES DAILY
Qty: 10 TABLET | Refills: 0 | Status: SHIPPED | OUTPATIENT
Start: 2024-04-09 | End: 2024-04-14

## 2024-04-09 RX ORDER — DIPHENHYDRAMINE HYDROCHLORIDE 50 MG/ML
12.5 INJECTION INTRAMUSCULAR; INTRAVENOUS
Status: DISCONTINUED | OUTPATIENT
Start: 2024-04-09 | End: 2024-04-09 | Stop reason: HOSPADM

## 2024-04-09 RX ORDER — OXYCODONE HYDROCHLORIDE 5 MG/1
10 TABLET ORAL PRN
Status: COMPLETED | OUTPATIENT
Start: 2024-04-09 | End: 2024-04-09

## 2024-04-09 RX ORDER — SODIUM CHLORIDE 9 MG/ML
INJECTION, SOLUTION INTRAVENOUS PRN
Status: DISCONTINUED | OUTPATIENT
Start: 2024-04-09 | End: 2024-04-09 | Stop reason: HOSPADM

## 2024-04-09 RX ORDER — SODIUM CHLORIDE 0.9 % (FLUSH) 0.9 %
5-40 SYRINGE (ML) INJECTION PRN
Status: DISCONTINUED | OUTPATIENT
Start: 2024-04-09 | End: 2024-04-09 | Stop reason: HOSPADM

## 2024-04-09 RX ORDER — SODIUM CHLORIDE, SODIUM LACTATE, POTASSIUM CHLORIDE, CALCIUM CHLORIDE 600; 310; 30; 20 MG/100ML; MG/100ML; MG/100ML; MG/100ML
INJECTION, SOLUTION INTRAVENOUS ONCE
Status: DISCONTINUED | OUTPATIENT
Start: 2024-04-09 | End: 2024-04-09 | Stop reason: HOSPADM

## 2024-04-09 RX ORDER — LABETALOL HYDROCHLORIDE 5 MG/ML
10 INJECTION, SOLUTION INTRAVENOUS
Status: DISCONTINUED | OUTPATIENT
Start: 2024-04-09 | End: 2024-04-09 | Stop reason: HOSPADM

## 2024-04-09 RX ORDER — SODIUM CHLORIDE 0.9 % (FLUSH) 0.9 %
5-40 SYRINGE (ML) INJECTION EVERY 12 HOURS SCHEDULED
Status: DISCONTINUED | OUTPATIENT
Start: 2024-04-09 | End: 2024-04-09 | Stop reason: HOSPADM

## 2024-04-09 RX ORDER — HYDRALAZINE HYDROCHLORIDE 20 MG/ML
10 INJECTION INTRAMUSCULAR; INTRAVENOUS
Status: DISCONTINUED | OUTPATIENT
Start: 2024-04-09 | End: 2024-04-09 | Stop reason: HOSPADM

## 2024-04-09 RX ORDER — PROPOFOL 10 MG/ML
INJECTION, EMULSION INTRAVENOUS PRN
Status: DISCONTINUED | OUTPATIENT
Start: 2024-04-09 | End: 2024-04-09 | Stop reason: SDUPTHER

## 2024-04-09 RX ORDER — PHENAZOPYRIDINE HYDROCHLORIDE 95 MG/1
95 TABLET ORAL 3 TIMES DAILY PRN
Qty: 30 TABLET | Refills: 0 | Status: SHIPPED | OUTPATIENT
Start: 2024-04-09 | End: 2024-04-19

## 2024-04-09 RX ORDER — DEXTROSE MONOHYDRATE 100 MG/ML
INJECTION, SOLUTION INTRAVENOUS CONTINUOUS PRN
Status: DISCONTINUED | OUTPATIENT
Start: 2024-04-09 | End: 2024-04-09 | Stop reason: HOSPADM

## 2024-04-09 RX ORDER — SODIUM CHLORIDE, SODIUM LACTATE, POTASSIUM CHLORIDE, CALCIUM CHLORIDE 600; 310; 30; 20 MG/100ML; MG/100ML; MG/100ML; MG/100ML
INJECTION, SOLUTION INTRAVENOUS CONTINUOUS
Status: DISCONTINUED | OUTPATIENT
Start: 2024-04-09 | End: 2024-04-09 | Stop reason: HOSPADM

## 2024-04-09 RX ORDER — ONDANSETRON 2 MG/ML
4 INJECTION INTRAMUSCULAR; INTRAVENOUS
Status: DISCONTINUED | OUTPATIENT
Start: 2024-04-09 | End: 2024-04-09 | Stop reason: HOSPADM

## 2024-04-09 RX ORDER — HYDROMORPHONE HYDROCHLORIDE 1 MG/ML
0.5 INJECTION, SOLUTION INTRAMUSCULAR; INTRAVENOUS; SUBCUTANEOUS EVERY 5 MIN PRN
Status: DISCONTINUED | OUTPATIENT
Start: 2024-04-09 | End: 2024-04-09 | Stop reason: HOSPADM

## 2024-04-09 RX ORDER — KETOROLAC TROMETHAMINE 10 MG/1
10 TABLET, FILM COATED ORAL EVERY 6 HOURS PRN
Qty: 20 TABLET | Refills: 0 | Status: SHIPPED | OUTPATIENT
Start: 2024-04-09

## 2024-04-09 RX ORDER — ONDANSETRON 2 MG/ML
INJECTION INTRAMUSCULAR; INTRAVENOUS PRN
Status: DISCONTINUED | OUTPATIENT
Start: 2024-04-09 | End: 2024-04-09 | Stop reason: SDUPTHER

## 2024-04-09 RX ORDER — LIDOCAINE HYDROCHLORIDE 20 MG/ML
JELLY TOPICAL PRN
Status: DISCONTINUED | OUTPATIENT
Start: 2024-04-09 | End: 2024-04-09 | Stop reason: HOSPADM

## 2024-04-09 RX ORDER — LIDOCAINE HYDROCHLORIDE 20 MG/ML
INJECTION, SOLUTION EPIDURAL; INFILTRATION; INTRACAUDAL; PERINEURAL PRN
Status: DISCONTINUED | OUTPATIENT
Start: 2024-04-09 | End: 2024-04-09 | Stop reason: SDUPTHER

## 2024-04-09 RX ORDER — NALOXONE HYDROCHLORIDE 0.4 MG/ML
INJECTION, SOLUTION INTRAMUSCULAR; INTRAVENOUS; SUBCUTANEOUS PRN
Status: DISCONTINUED | OUTPATIENT
Start: 2024-04-09 | End: 2024-04-09 | Stop reason: HOSPADM

## 2024-04-09 RX ORDER — OXYCODONE HYDROCHLORIDE 5 MG/1
5 TABLET ORAL PRN
Status: COMPLETED | OUTPATIENT
Start: 2024-04-09 | End: 2024-04-09

## 2024-04-09 RX ORDER — FENTANYL CITRATE 50 UG/ML
50 INJECTION, SOLUTION INTRAMUSCULAR; INTRAVENOUS EVERY 5 MIN PRN
Status: DISCONTINUED | OUTPATIENT
Start: 2024-04-09 | End: 2024-04-09 | Stop reason: HOSPADM

## 2024-04-09 RX ORDER — GLUCAGON 1 MG/ML
1 KIT INJECTION PRN
Status: DISCONTINUED | OUTPATIENT
Start: 2024-04-09 | End: 2024-04-09 | Stop reason: HOSPADM

## 2024-04-09 RX ORDER — IPRATROPIUM BROMIDE AND ALBUTEROL SULFATE 2.5; .5 MG/3ML; MG/3ML
1 SOLUTION RESPIRATORY (INHALATION)
Status: DISCONTINUED | OUTPATIENT
Start: 2024-04-09 | End: 2024-04-09 | Stop reason: HOSPADM

## 2024-04-09 RX ORDER — FENTANYL CITRATE 50 UG/ML
INJECTION, SOLUTION INTRAMUSCULAR; INTRAVENOUS PRN
Status: DISCONTINUED | OUTPATIENT
Start: 2024-04-09 | End: 2024-04-09 | Stop reason: SDUPTHER

## 2024-04-09 RX ORDER — SODIUM CHLORIDE, SODIUM LACTATE, POTASSIUM CHLORIDE, CALCIUM CHLORIDE 600; 310; 30; 20 MG/100ML; MG/100ML; MG/100ML; MG/100ML
INJECTION, SOLUTION INTRAVENOUS CONTINUOUS PRN
Status: DISCONTINUED | OUTPATIENT
Start: 2024-04-09 | End: 2024-04-09 | Stop reason: SDUPTHER

## 2024-04-09 RX ORDER — ACETAMINOPHEN 325 MG/1
650 TABLET ORAL EVERY 6 HOURS PRN
COMMUNITY

## 2024-04-09 RX ADMIN — PROPOFOL 50 MG: 10 INJECTION, EMULSION INTRAVENOUS at 09:27

## 2024-04-09 RX ADMIN — OXYCODONE 5 MG: 5 TABLET ORAL at 13:46

## 2024-04-09 RX ADMIN — FENTANYL CITRATE 25 MCG: 50 INJECTION, SOLUTION INTRAMUSCULAR; INTRAVENOUS at 10:40

## 2024-04-09 RX ADMIN — FENTANYL CITRATE 50 MCG: 50 INJECTION, SOLUTION INTRAMUSCULAR; INTRAVENOUS at 10:18

## 2024-04-09 RX ADMIN — ONDANSETRON 4 MG: 2 INJECTION INTRAMUSCULAR; INTRAVENOUS at 09:30

## 2024-04-09 RX ADMIN — FENTANYL CITRATE 25 MCG: 50 INJECTION, SOLUTION INTRAMUSCULAR; INTRAVENOUS at 11:22

## 2024-04-09 RX ADMIN — HYDRALAZINE HYDROCHLORIDE 10 MG: 20 INJECTION INTRAMUSCULAR; INTRAVENOUS at 12:21

## 2024-04-09 RX ADMIN — PROPOFOL 100 MG: 10 INJECTION, EMULSION INTRAVENOUS at 09:24

## 2024-04-09 RX ADMIN — GENTAMICIN SULFATE 80 MG: 40 INJECTION, SOLUTION INTRAMUSCULAR; INTRAVENOUS at 09:08

## 2024-04-09 RX ADMIN — FENTANYL CITRATE 50 MCG: 50 INJECTION, SOLUTION INTRAMUSCULAR; INTRAVENOUS at 09:49

## 2024-04-09 RX ADMIN — DEXAMETHASONE SODIUM PHOSPHATE 4 MG: 4 INJECTION, SOLUTION INTRA-ARTICULAR; INTRALESIONAL; INTRAMUSCULAR; INTRAVENOUS; SOFT TISSUE at 09:30

## 2024-04-09 RX ADMIN — FUROSEMIDE 10 MG: 10 INJECTION, SOLUTION INTRAMUSCULAR; INTRAVENOUS at 11:51

## 2024-04-09 RX ADMIN — SODIUM CHLORIDE, POTASSIUM CHLORIDE, SODIUM LACTATE AND CALCIUM CHLORIDE: 600; 310; 30; 20 INJECTION, SOLUTION INTRAVENOUS at 06:53

## 2024-04-09 RX ADMIN — SODIUM CHLORIDE, POTASSIUM CHLORIDE, SODIUM LACTATE AND CALCIUM CHLORIDE: 600; 310; 30; 20 INJECTION, SOLUTION INTRAVENOUS at 09:14

## 2024-04-09 RX ADMIN — LIDOCAINE HYDROCHLORIDE 100 MG: 20 INJECTION, SOLUTION EPIDURAL; INFILTRATION; INTRACAUDAL; PERINEURAL at 09:24

## 2024-04-09 RX ADMIN — SODIUM CHLORIDE, POTASSIUM CHLORIDE, SODIUM LACTATE AND CALCIUM CHLORIDE: 600; 310; 30; 20 INJECTION, SOLUTION INTRAVENOUS at 11:54

## 2024-04-09 RX ADMIN — FENTANYL CITRATE 50 MCG: 50 INJECTION, SOLUTION INTRAMUSCULAR; INTRAVENOUS at 09:30

## 2024-04-09 ASSESSMENT — PAIN - FUNCTIONAL ASSESSMENT
PAIN_FUNCTIONAL_ASSESSMENT: 0-10
PAIN_FUNCTIONAL_ASSESSMENT: NONE - DENIES PAIN

## 2024-04-09 ASSESSMENT — PAIN SCALES - GENERAL: PAINLEVEL_OUTOF10: 6

## 2024-04-09 NOTE — PROGRESS NOTES
Report called to [facility]-Ursula Gilliam LPN. Discussed discharge instructions to include: follow-up appointment, sosa care, medications, diet , vital signs and post -op care. Verbalized understanding of information. Written instructions sent with patient to facility. Denies c/o pain or discomfort.

## 2024-04-09 NOTE — OP NOTE
pieces that were about a millimeter in size I blasted into  smaller pieces with the thulium laser.  At the end the procedure there were no large pieces I could see on fluoroscopy.  I then cystoscoped the patient and passed a 7-22/30 double-J stent up the right ureteral orifice. It Curled nicely in the kidney and the bladder.  The most proximal end of the double-J was in the lower collecting system which is where I wanted, to help remove some of the small pieces I had lasered.  There was some blood coming from the right ureteral orifice but not a lot and the bladder is little macerated with dilator and going up the right ureteral orifice so I  put a Jj catheter. The patient was taken  off the table to the recovery room without complication.    Electronically signed by MEHREEN DONAHUE MD on 4/9/2024 at 12:07 PM

## 2024-04-09 NOTE — ANESTHESIA POSTPROCEDURE EVALUATION
Department of Anesthesiology  Postprocedure Note    Patient: Jimena Sepulveda  MRN: [id number]  YOB: 1955  Date of evaluation: 4/9/2024    Procedure Summary       Date: 04/09/24 Room / Location: [facility] MAIN CYSTO / [facility] MAIN OR    Anesthesia Start: 0914 Anesthesia Stop: 1206    Procedure: CYSTOURETHROSCOPY, RIGHT URETEROSCOPY, LASER LITHOTRIPSY, URETERAL STENT EXCHANGE (Right: Bladder) Diagnosis:       Ureterolithiasis      (Ureterolithiasis [N20.1])    Surgeons: Doc Diaz MD Responsible Provider: Radha Lr MD    Anesthesia Type: General ASA Status: 3            Anesthesia Type: General    Sowmya Phase I: Sowmya Score: 10    Sowmya Phase II: Sowmya Score: 10    Anesthesia Post Evaluation    Patient location during evaluation: PACU  Patient participation: complete - patient participated  Level of consciousness: awake  Airway patency: patent  Nausea & Vomiting: no nausea and no vomiting  Cardiovascular status: hemodynamically stable  Respiratory status: acceptable  Hydration status: euvolemic  Pain management: adequate    No notable events documented.

## 2024-04-09 NOTE — ANESTHESIA PRE PROCEDURE
Department of Anesthesiology  Preprocedure Note       Name:  Jimena Sepulveda   Age:  69 y.o.  :  1955                                          MRN:  [id number]         Date:  2024      Surgeon: Surgeon(s):  Doc Diaz MD    Procedure: Procedure(s):  CYSTOURETHROSCOPY, RIGHT URETEROSCOPY, LASER LITHOTRIPSY, URETERAL STENT EXCHANGE    Medications prior to admission:   Prior to Admission medications    Medication Sig Start Date End Date Taking? Authorizing Provider   divalproex (DEPAKOTE SPRINKLE) 125 MG DR capsule Take 1 capsule by mouth 2 times daily   Yes Meenakshi Starks MD   acetaminophen (TYLENOL) 325 MG tablet Take 2 tablets by mouth every 6 hours as needed for Pain   Yes Meenakshi Starks MD   QUEtiapine (SEROQUEL) 100 MG tablet Take 1 tablet by mouth at bedtime    Meenakshi Starks MD   sertraline (ZOLOFT) 100 MG tablet Take 2 tablets by mouth daily    Meenakshi Starks MD   busPIRone (BUSPAR) 15 MG tablet Take 15 mg by mouth 2 times daily 23   Meenakshi Starks MD   donepezil (ARICEPT) 10 MG tablet TAKE 1 TABLET BY MOUTH EVERYDAY AT BEDTIME 3/20/23   Meenakshi Starks MD   levothyroxine (SYNTHROID) 150 MCG tablet Take 125 mcg by mouth every morning (before breakfast) 4/3/23   Meenakshi Starks MD   metoprolol tartrate (LOPRESSOR) 25 MG tablet Take 1 tablet by mouth 2 times daily 23   Meenakshi Starks MD   traZODone (DESYREL) 100 MG tablet Take 2 tablets by mouth nightly 5/15/23   Meenakshi Starks MD   aspirin 81 MG EC tablet Take 1 tablet by mouth daily    Meenakshi Starks MD   atorvastatin (LIPITOR) 40 MG tablet Take 1 tablet by mouth daily    Meenakshi Starks MD       Current medications:    Current Facility-Administered Medications   Medication Dose Route Frequency Provider Last Rate Last Admin   • gentamicin (GARAMYCIN) 80 mg in sodium chloride 0.9 % 100 mL IVPB  80 mg IntraVENous Once Doc Diaz MD       • lactated

## 2024-04-10 LAB
BACTERIA SPEC CULT: NORMAL
Lab: NORMAL

## 2024-04-15 LAB
2,8 DIHYDROXYADENINE: NORMAL
AMM URATE MFR STONE: NORMAL %
BILIRUBIN, STONE: NORMAL
BLD.DRIED MFR STONE: NORMAL %
CA BILIRUB MFR STONE: NORMAL %
CA CARBONATE MFR STONE: NORMAL %
CA H2 PHOS DIHYD MFR STONE: NORMAL %
CA PHOS MFR STONE: NORMAL %
CALCIUM OXALATE DIHYDRATE MFR STONE IR: 30 %
CALCIUM PALMITATE: NORMAL
CALCIUM STEARATE: NORMAL
CARBONATE APATITE: NORMAL %
CELL MATERIAL STONE EST-MCNT: NORMAL %
CHOLEST MFR STONE: NORMAL %
COLOR STONE: NORMAL
COM MFR STONE: 70 %
CYSTINE MFR STONE: NORMAL %
DRUG OR METABOLITE: NORMAL
HYDROXYAPATITE: NORMAL %
LABORATORY COMMENT REPORT: NORMAL
LABORATORY COMMENT REPORT: NORMAL
Lab: NORMAL
Lab: NORMAL
NA URATE MFR STONE IR: NORMAL %
NEWBERYITE MFR STONE: NORMAL %
NIDUS STONE QL: NORMAL
OTHER COMPONENT(S): NORMAL
PHOTO: NORMAL
SHELL: NORMAL
SIZE STONE: NORMAL MM
SPECIMEN SOURCE: NORMAL
STONE ANALYSIS-IMP: NORMAL
STONE COMPOSITION: NORMAL
SURFACE CRYSTALS: NORMAL
TRI-PHOS MFR STONE: NORMAL %
TRIAMTERENE MFR STONE: NORMAL %
URATE DIHYD MFR STONE: NORMAL %
URATE MFR STONE: NORMAL %
WT STONE: 276 MG
XANTHINE, STONE: NORMAL

## 2024-04-16 ENCOUNTER — OFFICE VISIT (OUTPATIENT)
Age: 69
End: 2024-04-16
Payer: MEDICARE

## 2024-04-16 VITALS — SYSTOLIC BLOOD PRESSURE: 138 MMHG | HEART RATE: 71 BPM | DIASTOLIC BLOOD PRESSURE: 59 MMHG

## 2024-04-16 DIAGNOSIS — N20.0 LEFT RENAL STONE: ICD-10-CM

## 2024-04-16 DIAGNOSIS — N20.0 CALCIUM OXALATE STONES: ICD-10-CM

## 2024-04-16 DIAGNOSIS — N20.0 RIGHT RENAL STONE: Primary | ICD-10-CM

## 2024-04-16 DIAGNOSIS — N20.0 BILATERAL KIDNEY STONES: ICD-10-CM

## 2024-04-16 DIAGNOSIS — Z96.0 RETAINED URETERAL STENT: ICD-10-CM

## 2024-04-16 PROCEDURE — 99214 OFFICE O/P EST MOD 30 MIN: CPT | Performed by: UROLOGY

## 2024-04-16 PROCEDURE — 1123F ACP DISCUSS/DSCN MKR DOCD: CPT | Performed by: UROLOGY

## 2024-04-16 PROCEDURE — 52310 CYSTOSCOPY AND TREATMENT: CPT | Performed by: UROLOGY

## 2024-04-16 RX ORDER — CIPROFLOXACIN 500 MG/1
500 TABLET, FILM COATED ORAL ONCE
Status: COMPLETED | OUTPATIENT
Start: 2024-04-16 | End: 2024-04-17

## 2024-04-16 NOTE — PROGRESS NOTES
OFFICE CYSTOSCOPY    The patient presented for cystoscopy and was placed supine on the procedure table.  The genitals were prepped with chlorahexadine and a Urojet.  Using a 16F flexible cystoscope, cystourerthroscopy was performed.    CYSTOSCOPY/ STENT REMOVAL  Patient presented for cystoscopy and ureteral stent removal.  The patient was placed supine on the procedure table and the genitals were prepped and with chlorhexidine.  Using 16 Lithuanian flexible cystoscope, cystourethroscopy was performed.    The urethra was patent without stricturing.    The visualized portions of the bladder mucosa was without trabeculation, tumors or concerning erythema.    There was a stent in the right  ureter. It was grasped and withdrawn intact.       
Chief Complaint   Patient presents with    Procedure     Cysto stent removal        BP (!) 138/59 (Site: Right Upper Arm, Position: Supine, Cuff Size: Medium Adult)   Pulse 71      PHQ-9 score is    Negative      1. \"Have you been to the ER, urgent care clinic since your last visit?  Hospitalized since your last visit?\" No    2. \"Have you seen or consulted any other health care providers outside of the [facility] since your last visit?\" No     3. For patients aged 45-75: Has the patient had a colonoscopy / FIT/ Cologuard? Yes - no Care Gap present      If the patient is female:    4. For patients aged 40-74: Has the patient had a mammogram within the past 2 years? NA - based on age or sex      5. For patients aged 21-65: Has the patient had a pap smear? NA - based on age or sex  
      ROS  Review of Systems   All other systems reviewed and are negative.        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  EXTREMITIES/BACK: warm and perfused, no tenderness, no edema, no signs of DVT (warmth, asymmetric swelling, erythema, or calf tenderness).  SKIN: no rashes or signs of trauma  NEURO:  * Speech clear  * Moves U&LE to command  ASSESSMENT and PLAN  1. Right renal stone  -     AMB POC URINALYSIS DIP STICK AUTO W/O MICRO  2. Retained ureteral stent  3. Left renal stone  4. Bilateral kidney stones  -     PTH, INTACT AND CALCIUM; Future  -     Uric Acid; Future  5. Calcium oxalate stones    Left nephroscopy ureteroscopy with laser lithotripsy, and stent placement.  She needs a Litholink workup. patient is aware the risk of bleeding infection injury to the kidney ureter vascular injury pulm embolus and death she has no questions  Doc Diaz MD      Please note that portions of this note was potentially completed with Dragon dictation, the computer voice recognition software.  Quite often unanticipated grammatical, syntax, homophones, and other interpretive errors are inadvertently transcribed by the computer software.  Please disregard these errors.  Please excuse any errors that have escaped final proofreading.  Thank you.

## 2024-04-17 RX ADMIN — CIPROFLOXACIN 500 MG: 500 TABLET, FILM COATED ORAL at 13:56

## 2024-04-19 ENCOUNTER — PREP FOR PROCEDURE (OUTPATIENT)
Age: 69
End: 2024-04-19

## 2024-04-19 DIAGNOSIS — N20.0 KIDNEY STONES: ICD-10-CM

## 2024-04-25 ENCOUNTER — TELEPHONE (OUTPATIENT)
Age: 69
End: 2024-04-25

## 2024-04-25 NOTE — TELEPHONE ENCOUNTER
I contacted [facility] to followup on order for patient.  (Artemio had stated she spoke to Ms Jade from Facility who told artemio nothing had arrived and no phone calls from [facility] and they contacted [facility] but were told no orders)    I spoke to Madison at [facility] who stated first order birthdate was incorrect and so was [address].  I gave Madison new address: [address] [place] [address]. [facility] [address], phone: [phone] and Madison assured me that a new order was being sent and should arrive within 7 business days [employer].    Should leave plenty of time prior to patients upcoming apt.  I let Artemio know.

## 2024-05-09 ENCOUNTER — ANESTHESIA EVENT (OUTPATIENT)
Facility: HOSPITAL | Age: 69
End: 2024-05-09
Payer: COMMERCIAL

## 2024-05-09 ENCOUNTER — APPOINTMENT (OUTPATIENT)
Facility: HOSPITAL | Age: 69
End: 2024-05-09
Attending: UROLOGY
Payer: COMMERCIAL

## 2024-05-09 ENCOUNTER — HOSPITAL ENCOUNTER (OUTPATIENT)
Facility: HOSPITAL | Age: 69
Discharge: HOME | End: 2024-05-09
Attending: UROLOGY | Admitting: UROLOGY
Payer: COMMERCIAL

## 2024-05-09 ENCOUNTER — ANESTHESIA (OUTPATIENT)
Facility: HOSPITAL | Age: 69
End: 2024-05-09
Payer: COMMERCIAL

## 2024-05-09 VITALS
OXYGEN SATURATION: 96 % | RESPIRATION RATE: 20 BRPM | DIASTOLIC BLOOD PRESSURE: 42 MMHG | SYSTOLIC BLOOD PRESSURE: 111 MMHG | HEART RATE: 80 BPM | TEMPERATURE: 98.4 F

## 2024-05-09 DIAGNOSIS — G89.18 POST-OP PAIN: Primary | ICD-10-CM

## 2024-05-09 DIAGNOSIS — N20.0 KIDNEY STONES: ICD-10-CM

## 2024-05-09 PROCEDURE — 3600000014 HC SURGERY LEVEL 4 ADDTL 15MIN: Performed by: UROLOGY

## 2024-05-09 PROCEDURE — 6360000002 HC RX W HCPCS: Performed by: NURSE ANESTHETIST, CERTIFIED REGISTERED

## 2024-05-09 PROCEDURE — 2720000010 HC SURG SUPPLY STERILE: Performed by: UROLOGY

## 2024-05-09 PROCEDURE — C2617 STENT, NON-COR, TEM W/O DEL: HCPCS | Performed by: UROLOGY

## 2024-05-09 PROCEDURE — 7100000011 HC PHASE II RECOVERY - ADDTL 15 MIN: Performed by: UROLOGY

## 2024-05-09 PROCEDURE — 3700000001 HC ADD 15 MINUTES (ANESTHESIA): Performed by: UROLOGY

## 2024-05-09 PROCEDURE — 52356 CYSTO/URETERO W/LITHOTRIPSY: CPT | Performed by: UROLOGY

## 2024-05-09 PROCEDURE — 6360000002 HC RX W HCPCS: Performed by: UROLOGY

## 2024-05-09 PROCEDURE — 2580000003 HC RX 258: Performed by: UROLOGY

## 2024-05-09 PROCEDURE — 2709999900 HC NON-CHARGEABLE SUPPLY: Performed by: UROLOGY

## 2024-05-09 PROCEDURE — 7100000000 HC PACU RECOVERY - FIRST 15 MIN: Performed by: UROLOGY

## 2024-05-09 PROCEDURE — 2500000003 HC RX 250 WO HCPCS: Performed by: NURSE ANESTHETIST, CERTIFIED REGISTERED

## 2024-05-09 PROCEDURE — 7100000001 HC PACU RECOVERY - ADDTL 15 MIN: Performed by: UROLOGY

## 2024-05-09 PROCEDURE — 3600000004 HC SURGERY LEVEL 4 BASE: Performed by: UROLOGY

## 2024-05-09 PROCEDURE — 76000 FLUOROSCOPY <1 HR PHYS/QHP: CPT

## 2024-05-09 PROCEDURE — 6370000000 HC RX 637 (ALT 250 FOR IP): Performed by: ANESTHESIOLOGY

## 2024-05-09 PROCEDURE — C1769 GUIDE WIRE: HCPCS | Performed by: UROLOGY

## 2024-05-09 PROCEDURE — 7100000010 HC PHASE II RECOVERY - FIRST 15 MIN: Performed by: UROLOGY

## 2024-05-09 PROCEDURE — 82360 CALCULUS ASSAY QUANT: CPT

## 2024-05-09 PROCEDURE — 3700000000 HC ANESTHESIA ATTENDED CARE: Performed by: UROLOGY

## 2024-05-09 DEVICE — VARIABLE LENGTH URETERAL STENT
Type: IMPLANTABLE DEVICE | Site: URETER | Status: FUNCTIONAL
Brand: CONTOUR VL™

## 2024-05-09 RX ORDER — ONDANSETRON 2 MG/ML
4 INJECTION INTRAMUSCULAR; INTRAVENOUS
Status: DISCONTINUED | OUTPATIENT
Start: 2024-05-09 | End: 2024-05-09 | Stop reason: HOSPADM

## 2024-05-09 RX ORDER — LABETALOL HYDROCHLORIDE 5 MG/ML
10 INJECTION, SOLUTION INTRAVENOUS
Status: DISCONTINUED | OUTPATIENT
Start: 2024-05-09 | End: 2024-05-09 | Stop reason: HOSPADM

## 2024-05-09 RX ORDER — METOCLOPRAMIDE HYDROCHLORIDE 5 MG/ML
10 INJECTION INTRAMUSCULAR; INTRAVENOUS
Status: DISCONTINUED | OUTPATIENT
Start: 2024-05-09 | End: 2024-05-09 | Stop reason: HOSPADM

## 2024-05-09 RX ORDER — SODIUM CHLORIDE 9 MG/ML
INJECTION, SOLUTION INTRAVENOUS PRN
Status: DISCONTINUED | OUTPATIENT
Start: 2024-05-09 | End: 2024-05-09 | Stop reason: HOSPADM

## 2024-05-09 RX ORDER — NALOXONE HYDROCHLORIDE 0.4 MG/ML
INJECTION, SOLUTION INTRAMUSCULAR; INTRAVENOUS; SUBCUTANEOUS PRN
Status: DISCONTINUED | OUTPATIENT
Start: 2024-05-09 | End: 2024-05-09 | Stop reason: HOSPADM

## 2024-05-09 RX ORDER — SODIUM CHLORIDE, SODIUM LACTATE, POTASSIUM CHLORIDE, CALCIUM CHLORIDE 600; 310; 30; 20 MG/100ML; MG/100ML; MG/100ML; MG/100ML
INJECTION, SOLUTION INTRAVENOUS CONTINUOUS
Status: DISCONTINUED | OUTPATIENT
Start: 2024-05-09 | End: 2024-05-09 | Stop reason: HOSPADM

## 2024-05-09 RX ORDER — LORAZEPAM 2 MG/ML
0.5 INJECTION INTRAMUSCULAR
Status: DISCONTINUED | OUTPATIENT
Start: 2024-05-09 | End: 2024-05-09 | Stop reason: HOSPADM

## 2024-05-09 RX ORDER — DIPHENHYDRAMINE HYDROCHLORIDE 50 MG/ML
12.5 INJECTION INTRAMUSCULAR; INTRAVENOUS
Status: DISCONTINUED | OUTPATIENT
Start: 2024-05-09 | End: 2024-05-09 | Stop reason: HOSPADM

## 2024-05-09 RX ORDER — MEPERIDINE HYDROCHLORIDE 25 MG/ML
12.5 INJECTION INTRAMUSCULAR; INTRAVENOUS; SUBCUTANEOUS EVERY 5 MIN PRN
Status: DISCONTINUED | OUTPATIENT
Start: 2024-05-09 | End: 2024-05-09 | Stop reason: HOSPADM

## 2024-05-09 RX ORDER — FENTANYL CITRATE 50 UG/ML
50 INJECTION, SOLUTION INTRAMUSCULAR; INTRAVENOUS EVERY 5 MIN PRN
Status: DISCONTINUED | OUTPATIENT
Start: 2024-05-09 | End: 2024-05-09 | Stop reason: HOSPADM

## 2024-05-09 RX ORDER — DEXTROSE MONOHYDRATE 100 MG/ML
INJECTION, SOLUTION INTRAVENOUS CONTINUOUS PRN
Status: DISCONTINUED | OUTPATIENT
Start: 2024-05-09 | End: 2024-05-09 | Stop reason: HOSPADM

## 2024-05-09 RX ORDER — PHENAZOPYRIDINE HYDROCHLORIDE 100 MG/1
100 TABLET, FILM COATED ORAL 3 TIMES DAILY PRN
Qty: 30 TABLET | Refills: 0 | Status: SHIPPED | OUTPATIENT
Start: 2024-05-09

## 2024-05-09 RX ORDER — LIDOCAINE 4 G/G
1 PATCH TOPICAL AS NEEDED
Status: DISCONTINUED | OUTPATIENT
Start: 2024-05-09 | End: 2024-05-09 | Stop reason: HOSPADM

## 2024-05-09 RX ORDER — OXYCODONE HYDROCHLORIDE AND ACETAMINOPHEN 5; 325 MG/1; MG/1
1 TABLET ORAL EVERY 6 HOURS PRN
Qty: 12 TABLET | Refills: 0 | Status: SHIPPED | OUTPATIENT
Start: 2024-05-09 | End: 2024-05-12

## 2024-05-09 RX ORDER — FENTANYL CITRATE 50 UG/ML
INJECTION, SOLUTION INTRAMUSCULAR; INTRAVENOUS PRN
Status: DISCONTINUED | OUTPATIENT
Start: 2024-05-09 | End: 2024-05-09 | Stop reason: SDUPTHER

## 2024-05-09 RX ORDER — KETOROLAC TROMETHAMINE 30 MG/ML
INJECTION, SOLUTION INTRAMUSCULAR; INTRAVENOUS PRN
Status: DISCONTINUED | OUTPATIENT
Start: 2024-05-09 | End: 2024-05-09 | Stop reason: SDUPTHER

## 2024-05-09 RX ORDER — LIDOCAINE HYDROCHLORIDE 20 MG/ML
INJECTION, SOLUTION EPIDURAL; INFILTRATION; INTRACAUDAL; PERINEURAL PRN
Status: DISCONTINUED | OUTPATIENT
Start: 2024-05-09 | End: 2024-05-09 | Stop reason: SDUPTHER

## 2024-05-09 RX ORDER — SODIUM CHLORIDE, SODIUM LACTATE, POTASSIUM CHLORIDE, CALCIUM CHLORIDE 600; 310; 30; 20 MG/100ML; MG/100ML; MG/100ML; MG/100ML
INJECTION, SOLUTION INTRAVENOUS ONCE
Status: DISCONTINUED | OUTPATIENT
Start: 2024-05-09 | End: 2024-05-09 | Stop reason: HOSPADM

## 2024-05-09 RX ORDER — PROPOFOL 10 MG/ML
INJECTION, EMULSION INTRAVENOUS PRN
Status: DISCONTINUED | OUTPATIENT
Start: 2024-05-09 | End: 2024-05-09 | Stop reason: SDUPTHER

## 2024-05-09 RX ORDER — OXYCODONE HYDROCHLORIDE 5 MG/1
10 TABLET ORAL PRN
Status: COMPLETED | OUTPATIENT
Start: 2024-05-09 | End: 2024-05-09

## 2024-05-09 RX ORDER — SODIUM CHLORIDE 0.9 % (FLUSH) 0.9 %
5-40 SYRINGE (ML) INJECTION PRN
Status: DISCONTINUED | OUTPATIENT
Start: 2024-05-09 | End: 2024-05-09 | Stop reason: HOSPADM

## 2024-05-09 RX ORDER — SULFAMETHOXAZOLE AND TRIMETHOPRIM 800; 160 MG/1; MG/1
1 TABLET ORAL 2 TIMES DAILY
Qty: 10 TABLET | Refills: 0 | Status: SHIPPED | OUTPATIENT
Start: 2024-05-09 | End: 2024-05-14

## 2024-05-09 RX ORDER — SODIUM CHLORIDE 0.9 % (FLUSH) 0.9 %
5-40 SYRINGE (ML) INJECTION EVERY 12 HOURS SCHEDULED
Status: DISCONTINUED | OUTPATIENT
Start: 2024-05-09 | End: 2024-05-09 | Stop reason: HOSPADM

## 2024-05-09 RX ORDER — HYDROMORPHONE HYDROCHLORIDE 1 MG/ML
0.5 INJECTION, SOLUTION INTRAMUSCULAR; INTRAVENOUS; SUBCUTANEOUS EVERY 5 MIN PRN
Status: DISCONTINUED | OUTPATIENT
Start: 2024-05-09 | End: 2024-05-09 | Stop reason: HOSPADM

## 2024-05-09 RX ORDER — HYDRALAZINE HYDROCHLORIDE 20 MG/ML
10 INJECTION INTRAMUSCULAR; INTRAVENOUS
Status: DISCONTINUED | OUTPATIENT
Start: 2024-05-09 | End: 2024-05-09 | Stop reason: HOSPADM

## 2024-05-09 RX ORDER — ONDANSETRON 2 MG/ML
INJECTION INTRAMUSCULAR; INTRAVENOUS PRN
Status: DISCONTINUED | OUTPATIENT
Start: 2024-05-09 | End: 2024-05-09 | Stop reason: SDUPTHER

## 2024-05-09 RX ORDER — OXYCODONE HYDROCHLORIDE 5 MG/1
5 TABLET ORAL PRN
Status: COMPLETED | OUTPATIENT
Start: 2024-05-09 | End: 2024-05-09

## 2024-05-09 RX ORDER — DEXAMETHASONE SODIUM PHOSPHATE 4 MG/ML
INJECTION, SOLUTION INTRA-ARTICULAR; INTRALESIONAL; INTRAMUSCULAR; INTRAVENOUS; SOFT TISSUE PRN
Status: DISCONTINUED | OUTPATIENT
Start: 2024-05-09 | End: 2024-05-09 | Stop reason: SDUPTHER

## 2024-05-09 RX ORDER — IPRATROPIUM BROMIDE AND ALBUTEROL SULFATE 2.5; .5 MG/3ML; MG/3ML
1 SOLUTION RESPIRATORY (INHALATION)
Status: DISCONTINUED | OUTPATIENT
Start: 2024-05-09 | End: 2024-05-09 | Stop reason: HOSPADM

## 2024-05-09 RX ADMIN — DEXAMETHASONE SODIUM PHOSPHATE 4 MG: 4 INJECTION, SOLUTION INTRA-ARTICULAR; INTRALESIONAL; INTRAMUSCULAR; INTRAVENOUS; SOFT TISSUE at 11:03

## 2024-05-09 RX ADMIN — ONDANSETRON 4 MG: 2 INJECTION INTRAMUSCULAR; INTRAVENOUS at 11:03

## 2024-05-09 RX ADMIN — PROPOFOL 100 MG: 10 INJECTION, EMULSION INTRAVENOUS at 11:01

## 2024-05-09 RX ADMIN — SODIUM CHLORIDE, POTASSIUM CHLORIDE, SODIUM LACTATE AND CALCIUM CHLORIDE: 600; 310; 30; 20 INJECTION, SOLUTION INTRAVENOUS at 08:46

## 2024-05-09 RX ADMIN — OXYCODONE 5 MG: 5 TABLET ORAL at 13:22

## 2024-05-09 RX ADMIN — LIDOCAINE HYDROCHLORIDE 40 MG: 20 INJECTION, SOLUTION EPIDURAL; INFILTRATION; INTRACAUDAL; PERINEURAL at 10:56

## 2024-05-09 RX ADMIN — GENTAMICIN SULFATE 80 MG: 40 INJECTION, SOLUTION INTRAMUSCULAR; INTRAVENOUS at 10:07

## 2024-05-09 RX ADMIN — FENTANYL CITRATE 100 MCG: 50 INJECTION, SOLUTION INTRAMUSCULAR; INTRAVENOUS at 10:50

## 2024-05-09 RX ADMIN — PROPOFOL 100 MG: 10 INJECTION, EMULSION INTRAVENOUS at 10:56

## 2024-05-09 RX ADMIN — KETOROLAC TROMETHAMINE 15 MG: 30 INJECTION, SOLUTION INTRAMUSCULAR; INTRAVENOUS at 12:28

## 2024-05-09 ASSESSMENT — PAIN SCALES - GENERAL
PAINLEVEL_OUTOF10: 9
PAINLEVEL_OUTOF10: 9
PAINLEVEL_OUTOF10: 8

## 2024-05-09 ASSESSMENT — PAIN DESCRIPTION - ORIENTATION: ORIENTATION: OTHER (COMMENT)

## 2024-05-09 ASSESSMENT — PAIN DESCRIPTION - DESCRIPTORS
DESCRIPTORS: BURNING
DESCRIPTORS: BURNING

## 2024-05-09 ASSESSMENT — PAIN - FUNCTIONAL ASSESSMENT
PAIN_FUNCTIONAL_ASSESSMENT: NONE - DENIES PAIN
PAIN_FUNCTIONAL_ASSESSMENT: 0-10

## 2024-05-09 ASSESSMENT — PAIN DESCRIPTION - LOCATION
LOCATION: LEG
LOCATION: ABDOMEN

## 2024-05-09 NOTE — ANESTHESIA PRE PROCEDURE
Department of Anesthesiology  Preprocedure Note       Name:  Jimena Sepulveda   Age:  69 y.o.  :  1955                                          MRN:  [id number]         Date:  2024      Surgeon: Surgeon(s):  Doc Diaz MD    Procedure: Procedure(s):  CYSTOURETHROSCOPY, LEFT NEPHROSCOPY LASER LITHOTRIPSY AND URETERAL STENT PLACEMENT    Medications prior to admission:   Prior to Admission medications    Medication Sig Start Date End Date Taking? Authorizing Provider   divalproex (DEPAKOTE SPRINKLE) 125 MG DR capsule Take 1 capsule by mouth 2 times daily    Meenakshi Starks MD   acetaminophen (TYLENOL) 325 MG tablet Take 2 tablets by mouth every 6 hours as needed for Pain    Meenakshi Starks MD   ketorolac (TORADOL) 10 MG tablet Take 1 tablet by mouth every 6 hours as needed for Pain 24   Doc Diaz MD   trospium (SANCTURA) 20 MG tablet Take 1 tablet by mouth 2 times daily 24   Doc Diaz MD   QUEtiapine (SEROQUEL) 100 MG tablet Take 1 tablet by mouth at bedtime    Meenakshi Starks MD   sertraline (ZOLOFT) 100 MG tablet Take 2 tablets by mouth daily    Meenakshi Starks MD   busPIRone (BUSPAR) 15 MG tablet Take 15 mg by mouth 2 times daily 23   Meenakshi Starks MD   donepezil (ARICEPT) 10 MG tablet TAKE 1 TABLET BY MOUTH EVERYDAY AT BEDTIME 3/20/23   Meenakshi Starks MD   levothyroxine (SYNTHROID) 150 MCG tablet Take 125 mcg by mouth every morning (before breakfast) 4/3/23   Meenakshi Starks MD   metoprolol tartrate (LOPRESSOR) 25 MG tablet Take 1 tablet by mouth 2 times daily 23   Meenakshi Starks MD   traZODone (DESYREL) 100 MG tablet Take 2 tablets by mouth nightly 5/15/23   Meenakshi Starks MD   aspirin 81 MG EC tablet Take 1 tablet by mouth daily    Meenakshi Starks MD   atorvastatin (LIPITOR) 40 MG tablet Take 1 tablet by mouth daily    Meenakshi Starks MD       Current medications:    Current  01:05 PM    BILITOT 0.3 03/21/2024 01:05 PM    ALKPHOS 74 03/21/2024 01:05 PM    AST 14 03/21/2024 01:05 PM    ALT 10 03/21/2024 01:05 PM       POC Tests: No results for input(s): \"POCGLU\", \"POCNA\", \"POCK\", \"POCCL\", \"POCBUN\", \"POCHEMO\", \"POCHCT\" in the last 72 hours.    Coags: No results found for: \"PROTIME\", \"INR\", \"APTT\"    HCG (If Applicable): No results found for: \"PREGTESTUR\", \"PREGSERUM\", \"HCG\", \"HCGQUANT\"     ABGs: No results found for: \"PHART\", \"PO2ART\", \"AYV5QOR\", \"GPH2KTF\", \"BEART\", \"T8EJBSRL\"     Type & Screen (If Applicable):  No results found for: \"LABABO\"    Drug/Infectious Status (If Applicable):  No results found for: \"HIV\", \"HEPCAB\"    COVID-19 Screening (If Applicable): No results found for: \"COVID19\"        Anesthesia Evaluation  Patient summary reviewed and Nursing notes reviewed  Airway: Mallampati: III          Dental:    (+) edentulous      Pulmonary:normal exam    (+)  COPD:                                     Cardiovascular:    (+) hypertension:        Rhythm: regular  Rate: normal                    Neuro/Psych:   (+) CVA:, psychiatric history:depression/anxiety dementia             ROS comment: Blind both eyes GI/Hepatic/Renal:   (+) renal disease: kidney stones          Endo/Other:                     Abdominal:             Vascular:          Other Findings:           Anesthesia Plan      general     ASA 3     (Standard ASA monitors: continuous EKG, BP, HR, pulse oximeter, temperature, and capnography.)  Induction: intravenous.    MIPS: Postoperative opioids intended and Prophylactic antiemetics administered.  Anesthetic plan and risks discussed with spouse and legal guardian (and family, if present.).      Plan discussed with CRNA.                  Al Kidd MD   5/9/2024

## 2024-05-09 NOTE — OP NOTE
Operative Note      Patient: Jimena Sepulveda  YOB: 1955  MRN: [id number]    Date of Procedure: 5/9/2024    Pre-Op Diagnosis Codes:     * Kidney stones [N20.0]    Post-Op Diagnosis: Same       Procedure(s):  CYSTOURETHROSCOPY, LEFT NEPHROSCOPY LASER LITHOTRIPSY AND BASKET STONE REMOVAL, LEFT URETERAL STENT PLACEMENT    Surgeon(s):  Doc Diaz MD    Assistant:   * No surgical staff found *    Anesthesia: General    Estimated Blood Loss (mL): Minimal    Complications: None    Specimens:   * No specimens in log *    Implants:  Implant Name Type Inv. Item Serial No.  Lot No. LRB No. Used Action   STENT URET 7FR [id number] PERCFLX HYDR+ SFT PGTL TAPR TIP - SNA  STENT URET 7FR [id number] PERCFLX HYDR+ SFT PGTL TAPR TIP NA [employer]- [id number] Left 1 Implanted         Drains: * No LDAs found *    Findings:  Infection Present At Time Of Surgery (PATOS) (choose all levels that have infection present):  No infection present  Other Findings: at least 10 stones in kidney    Detailed Description of Procedure:   Patient history of bilateral stone disease and infection.  She has 10 stones at least in the left kidney she is set up for removal of the stones with laser.  She is aware the risk of bleeding infection injury kidney ureter vascular injury pulm embolus and death she aware of alternatives she has no questions   Procedure-patient prepped and draped in usual sterile fashion after undergoing anesthesia and placed in lithotomy position.  Timeout was performed preoperative antibiotics were given SCDs were applied   a 21 [place] cystoscope was placed into her bladder.  She had a normal-appearing urethra no stones or tumors appreciated the bladder 0.035 Glidewire passed up the left ureteral orifice up into the kidney under diagnostic fluoroscopy.  A 10/12 ureteral access sheath was placed up to the wire under diagnostic fluoroscopy to below the UPJ.  I safety wire was placed.  Then the

## 2024-05-09 NOTE — PERIOP NOTE
1510: Patient discharged via transport. Discharge instructions and medications reviewed/given. Patient verbalizes understanding. Evy DOOLEY called report. All questions answered. No distress noted, patient stable.

## 2024-05-09 NOTE — PROGRESS NOTES
REPORT CALLED TO [facility] SPOKE WITH ALAN BRAGG LPN   , PT VOIDED INCONT MOD AMT PINK TINGED URINE

## 2024-05-16 ENCOUNTER — PROCEDURE VISIT (OUTPATIENT)
Age: 69
End: 2024-05-16
Payer: COMMERCIAL

## 2024-05-16 VITALS
HEART RATE: 71 BPM | HEIGHT: 64 IN | SYSTOLIC BLOOD PRESSURE: 138 MMHG | BODY MASS INDEX: 32.78 KG/M2 | DIASTOLIC BLOOD PRESSURE: 75 MMHG | WEIGHT: 192 LBS

## 2024-05-16 DIAGNOSIS — R82.81 PYURIA: ICD-10-CM

## 2024-05-16 DIAGNOSIS — N20.0 RIGHT RENAL STONE: Primary | ICD-10-CM

## 2024-05-16 DIAGNOSIS — Z96.0 RETAINED URETERAL STENT: ICD-10-CM

## 2024-05-16 DIAGNOSIS — N20.1 LEFT URETERAL STONE: ICD-10-CM

## 2024-05-16 PROCEDURE — 52310 CYSTOSCOPY AND TREATMENT: CPT | Performed by: UROLOGY

## 2024-05-16 PROCEDURE — 1123F ACP DISCUSS/DSCN MKR DOCD: CPT | Performed by: UROLOGY

## 2024-05-16 PROCEDURE — 99213 OFFICE O/P EST LOW 20 MIN: CPT | Performed by: UROLOGY

## 2024-05-16 RX ORDER — SULFAMETHOXAZOLE AND TRIMETHOPRIM 800; 160 MG/1; MG/1
1 TABLET ORAL 2 TIMES DAILY
Qty: 10 TABLET | Refills: 0 | Status: SHIPPED | OUTPATIENT
Start: 2024-05-16 | End: 2024-05-21

## 2024-05-16 RX ORDER — CIPROFLOXACIN 500 MG/1
500 TABLET, FILM COATED ORAL ONCE
Status: SHIPPED | OUTPATIENT
Start: 2024-05-16

## 2024-05-16 NOTE — PROGRESS NOTES
HISTORY OF PRESENT ILLNESS  Jimena Sepulveda is a 69 y.o. female   Patient returns stable urgency and frequency has supposed cells the urine we will send her urine for culture.  Additionally today she will have her stent removed.  I will place her on some Bactrim and see her back in a few weeks.  1. Right renal stone  Overview:  CT scan of the abdomen pelvis revealed Bilateral renal calculi with severe right hydronephrosis secondary to 2 distal stones measuring 4 mm and 7 mm.       She is s/p CYSTOSCOPY, RIGHT DOUBLE J STENT PLACEMENT on 3/17/2024      She is s/p CYSTOURETHROSCOPY, RIGHT URETEROSCOPY, LASER LITHOTRIPSY, URETERAL STENT EXCHANGE on 4/9/2024  Cystoscopy with stent removal  Nephroscopy with laser lithotripsy and stent placement  -Findings: 3 significant stones in her ureter.      -2 large stones and deposits in her right kidney   Stent removal on 4/16/2024  Orders:  -     AMB POC URINALYSIS DIP STICK AUTO W/O MICRO  -     XR ABDOMEN (KUB) (SINGLE AP VIEW); Future  2. Left ureteral stone  Overview:   She is s/p CYSTOURETHROSCOPY, LEFT NEPHROSCOPY LASER LITHOTRIPSY AND BASKET STONE REMOVAL, LEFT URETERAL STENT PLACEMENT on 5/9/2024  Orders:  -     AMB POC URINALYSIS DIP STICK AUTO W/O MICRO  -     XR ABDOMEN (KUB) (SINGLE AP VIEW); Future  3. Retained ureteral stent  -     AMB POC URINALYSIS DIP STICK AUTO W/O MICRO  -     ciprofloxacin (CIPRO) tablet 500 mg; 500 mg, Oral, ONCE, 1 dose, On Thu 5/16/24 at 1430Antimicrobial Indications: Surgical ProphylaxisDo not take with dairy products or calcium-fortified juices. Tube feeding (TF) interaction, obtain physician order to manage. Recommend holding TF for 1 hr before and 1 hr after dose. Due to decreased absorption do not give by J tube.     4. Pyuria  -     sulfamethoxazole-trimethoprim (BACTRIM DS;SEPTRA DS) 800-160 MG per tablet; Take 1 tablet by mouth 2 times daily for 5 days, Disp-10 tablet, R-0Normal        PAST MEDICAL HISTORY  PMHx (including

## 2024-05-16 NOTE — PROGRESS NOTES
Chief Complaint   Patient presents with    Procedure     Cysto stent removal      /75 (Site: Left Lower Arm, Position: Supine, Cuff Size: Small Adult)   Pulse 71   Ht 1.626 m (5' 4\")   Wt 87.1 kg (192 lb)   BMI 32.96 kg/m²     1. Have you been to the ER, urgent care clinic since your last visit?  Hospitalized since your last visit?No    2. Have you seen or consulted any other health care providers outside of the [facility] since your last visit?  Include any pap smears or colon screening. No

## 2024-05-17 LAB
2,8 DIHYDROXYADENINE: NORMAL
AMM URATE MFR STONE: NORMAL %
BILIRUBIN, STONE: NORMAL
BLD.DRIED MFR STONE: NORMAL %
CA BILIRUB MFR STONE: NORMAL %
CA CARBONATE MFR STONE: NORMAL %
CA H2 PHOS DIHYD MFR STONE: NORMAL %
CA PHOS MFR STONE: NORMAL %
CALCIUM OXALATE DIHYDRATE MFR STONE IR: 20 %
CALCIUM PALMITATE: NORMAL
CALCIUM STEARATE: NORMAL
CARBONATE APATITE: NORMAL %
CELL MATERIAL STONE EST-MCNT: NORMAL %
CHOLEST MFR STONE: NORMAL %
COLOR STONE: NORMAL
COM MFR STONE: 80 %
CYSTINE MFR STONE: NORMAL %
DRUG OR METABOLITE: NORMAL
HYDROXYAPATITE: NORMAL %
LABORATORY COMMENT REPORT: NORMAL
LABORATORY COMMENT REPORT: NORMAL
Lab: NORMAL
Lab: NORMAL
NA URATE MFR STONE IR: NORMAL %
NEWBERYITE MFR STONE: NORMAL %
NIDUS STONE QL: NORMAL
OTHER COMPONENT(S): NORMAL
PHOTO: NORMAL
SHELL: NORMAL
SIZE STONE: NORMAL MM
SPECIMEN SOURCE: NORMAL
STONE ANALYSIS-IMP: NORMAL
STONE COMPOSITION: NORMAL
SURFACE CRYSTALS: NORMAL
TRI-PHOS MFR STONE: NORMAL %
TRIAMTERENE MFR STONE: NORMAL %
URATE DIHYD MFR STONE: NORMAL %
URATE MFR STONE: NORMAL %
WT STONE: 133 MG
XANTHINE, STONE: NORMAL

## 2024-05-30 NOTE — ANESTHESIA POSTPROCEDURE EVALUATION
Department of Anesthesiology  Postprocedure Note    Patient: Jimena Sepulveda  MRN: [id number]  YOB: 1955    Procedure Summary       Date: 05/09/24 Room / Location: [facility] MA[facility]STO [facility] MA[facility] OR    Anesthesia Start: 1048 Anesthesia Stop: 1238    Procedure: CYSTOURETHROSCOPY, LEFT NEPHROSCOPY LASER LITHOTRIPSY AND BASKET STONE REMOVAL, LEFT URETERAL STENT PLACEMENT (Left: Kidney) Diagnosis:       Kidney stones      (Kidney stones [N20.0])    Surgeons: Doc Diaz MD Responsible Provider: Al Kidd MD    Anesthesia Type: General ASA Status: 3            Anesthesia Type: General    Sowmya Phase I: Sowmya Score: 10    Sowmya Phase II: Sowmya Score: 10    Anesthesia Post Evaluation    Patient location during evaluation: PACU  Patient participation: complete - patient cannot participate  Level of consciousness: awake  Pain score: 0  Airway patency: patent  Nausea & Vomiting: no nausea and no vomiting  Cardiovascular status: hemodynamically stable  Respiratory status: acceptable  Hydration status: stable  Multimodal analgesia pain management approach        No notable events documented.

## 2024-06-11 NOTE — PROGRESS NOTES
CYSTOSCOPY/ STENT REMOVAL  Patient presented for cystoscopy and ureteral stent removal.  The patient was placed supine on the procedure table and the genitals were prepped and with chlorhexidine.  Using 16 Russian flexible cystoscope, cystourethroscopy was performed.    The urethra was patent without stricturing.    The visualized portions of the bladder mucosa was without trabeculation, tumors or concerning erythema.    There was a stent in the left ureter. It was grasped and withdrawn intact.

## 2024-06-13 ENCOUNTER — TRANSCRIBE ORDERS (OUTPATIENT)
Facility: HOSPITAL | Age: 69
End: 2024-06-13

## 2024-06-13 DIAGNOSIS — R10.11 RIGHT UPPER QUADRANT PAIN: Primary | ICD-10-CM

## 2024-06-13 DIAGNOSIS — R11.0 NAUSEA: ICD-10-CM

## 2024-06-25 ENCOUNTER — HOSPITAL ENCOUNTER (OUTPATIENT)
Facility: HOSPITAL | Age: 69
Discharge: HOME | End: 2024-06-28
Attending: FAMILY MEDICINE
Payer: COMMERCIAL

## 2024-06-25 DIAGNOSIS — R10.11 RIGHT UPPER QUADRANT PAIN: ICD-10-CM

## 2024-06-25 DIAGNOSIS — R11.0 NAUSEA: ICD-10-CM

## 2024-06-25 LAB — CREAT SERPL-MCNC: 1.07 MG/DL (ref 0.55–1.02)

## 2024-06-25 PROCEDURE — 82565 ASSAY OF CREATININE: CPT

## 2024-06-25 PROCEDURE — 74177 CT ABD & PELVIS W/CONTRAST: CPT

## 2024-06-25 PROCEDURE — 6360000004 HC RX CONTRAST MEDICATION: Performed by: FAMILY MEDICINE

## 2024-06-25 PROCEDURE — 36415 COLL VENOUS BLD VENIPUNCTURE: CPT

## 2024-06-25 RX ADMIN — IOPAMIDOL 100 ML: 755 INJECTION, SOLUTION INTRAVENOUS at 09:41

## 2024-07-02 ENCOUNTER — APPOINTMENT (OUTPATIENT)
Facility: HOSPITAL | Age: 69
DRG: 377 | End: 2024-07-02
Payer: MEDICARE

## 2024-07-02 ENCOUNTER — ANESTHESIA EVENT (OUTPATIENT)
Facility: HOSPITAL | Age: 69
End: 2024-07-02
Payer: MEDICARE

## 2024-07-02 ENCOUNTER — HOSPITAL ENCOUNTER (EMERGENCY)
Facility: HOSPITAL | Age: 69
Discharge: ANOTHER HOSPITAL | End: 2024-07-02
Attending: EMERGENCY MEDICINE
Payer: MEDICARE

## 2024-07-02 ENCOUNTER — HOSPITAL ENCOUNTER (INPATIENT)
Facility: HOSPITAL | Age: 69
LOS: 6 days | Discharge: NURSING, REHAB OR LONG-TERM CARE | DRG: 377 | End: 2024-07-08
Attending: EMERGENCY MEDICINE | Admitting: INTERNAL MEDICINE
Payer: MEDICARE

## 2024-07-02 ENCOUNTER — ANESTHESIA (OUTPATIENT)
Facility: HOSPITAL | Age: 69
End: 2024-07-02
Payer: MEDICARE

## 2024-07-02 VITALS
SYSTOLIC BLOOD PRESSURE: 151 MMHG | WEIGHT: 192 LBS | DIASTOLIC BLOOD PRESSURE: 68 MMHG | OXYGEN SATURATION: 99 % | TEMPERATURE: 97.7 F | BODY MASS INDEX: 32.78 KG/M2 | HEIGHT: 64 IN | RESPIRATION RATE: 21 BRPM | HEART RATE: 88 BPM

## 2024-07-02 DIAGNOSIS — K92.2 GASTROINTESTINAL HEMORRHAGE, UNSPECIFIED GASTROINTESTINAL HEMORRHAGE TYPE: Primary | ICD-10-CM

## 2024-07-02 DIAGNOSIS — D64.9 ANEMIA, UNSPECIFIED TYPE: ICD-10-CM

## 2024-07-02 DIAGNOSIS — K92.1 MELENA: Primary | ICD-10-CM

## 2024-07-02 LAB
ALBUMIN SERPL-MCNC: 2.9 G/DL (ref 3.5–5)
ALBUMIN/GLOB SERPL: 0.9 (ref 1.1–2.2)
ALP SERPL-CCNC: 62 U/L (ref 45–117)
ALT SERPL-CCNC: 15 U/L (ref 12–78)
ANION GAP SERPL CALC-SCNC: 21 MMOL/L (ref 5–15)
AST SERPL W P-5'-P-CCNC: 32 U/L (ref 15–37)
BASOPHILS # BLD: 0 K/UL (ref 0–0.1)
BASOPHILS # BLD: 0.1 K/UL (ref 0–0.1)
BASOPHILS NFR BLD: 0 % (ref 0–1)
BASOPHILS NFR BLD: 0 % (ref 0–1)
BILIRUB SERPL-MCNC: 0.5 MG/DL (ref 0.2–1)
BUN SERPL-MCNC: 27 MG/DL (ref 6–20)
BUN/CREAT SERPL: 17 (ref 12–20)
CA-I BLD-MCNC: 8.9 MG/DL (ref 8.5–10.1)
CHLORIDE SERPL-SCNC: 98 MMOL/L (ref 97–108)
CO2 SERPL-SCNC: 19 MMOL/L (ref 21–32)
CREAT SERPL-MCNC: 1.6 MG/DL (ref 0.55–1.02)
DIFFERENTIAL METHOD BLD: ABNORMAL
DIFFERENTIAL METHOD BLD: ABNORMAL
EOSINOPHIL # BLD: 0 K/UL (ref 0–0.4)
EOSINOPHIL # BLD: 0.1 K/UL (ref 0–0.4)
EOSINOPHIL NFR BLD: 0 % (ref 0–7)
EOSINOPHIL NFR BLD: 1 % (ref 0–7)
ERYTHROCYTE [DISTWIDTH] IN BLOOD BY AUTOMATED COUNT: 22.1 % (ref 11.5–14.5)
ERYTHROCYTE [DISTWIDTH] IN BLOOD BY AUTOMATED COUNT: 26 % (ref 11.5–14.5)
GLOBULIN SER CALC-MCNC: 3.1 G/DL (ref 2–4)
GLUCOSE SERPL-MCNC: 227 MG/DL (ref 65–100)
HCT VFR BLD AUTO: 20.2 % (ref 35–47)
HCT VFR BLD AUTO: 26.2 % (ref 35–47)
HGB BLD-MCNC: 5.7 G/DL (ref 11.5–16)
HGB BLD-MCNC: 8.5 G/DL (ref 11.5–16)
IMM GRANULOCYTES # BLD AUTO: 0.1 K/UL (ref 0–0.04)
IMM GRANULOCYTES # BLD AUTO: 0.2 K/UL (ref 0–0.04)
IMM GRANULOCYTES NFR BLD AUTO: 1 % (ref 0–0.5)
IMM GRANULOCYTES NFR BLD AUTO: 2 % (ref 0–0.5)
INR PPP: <0.8 (ref 0.9–1.1)
LYMPHOCYTES # BLD: 0.8 K/UL (ref 0.8–3.5)
LYMPHOCYTES # BLD: 1.6 K/UL (ref 0.8–3.5)
LYMPHOCYTES NFR BLD: 15 % (ref 12–49)
LYMPHOCYTES NFR BLD: 6 % (ref 12–49)
MCH RBC QN AUTO: 21.5 PG (ref 26–34)
MCH RBC QN AUTO: 25.7 PG (ref 26–34)
MCHC RBC AUTO-ENTMCNC: 28.2 G/DL (ref 30–36.5)
MCHC RBC AUTO-ENTMCNC: 32.4 G/DL (ref 30–36.5)
MCV RBC AUTO: 76.2 FL (ref 80–99)
MCV RBC AUTO: 79.2 FL (ref 80–99)
MONOCYTES # BLD: 0.9 K/UL (ref 0–1)
MONOCYTES # BLD: 1.2 K/UL (ref 0–1)
MONOCYTES NFR BLD: 10 % (ref 5–13)
MONOCYTES NFR BLD: 7 % (ref 5–13)
MRSA DNA SPEC QL NAA+PROBE: NOT DETECTED
NEUTS SEG # BLD: 11.4 K/UL (ref 1.8–8)
NEUTS SEG # BLD: 8.2 K/UL (ref 1.8–8)
NEUTS SEG NFR BLD: 72 % (ref 32–75)
NEUTS SEG NFR BLD: 86 % (ref 32–75)
NRBC # BLD: 0 K/UL (ref 0–0.01)
NRBC # BLD: 0.04 K/UL (ref 0–0.01)
NRBC BLD-RTO: 0 PER 100 WBC
NRBC BLD-RTO: 0.4 PER 100 WBC
PLATELET # BLD AUTO: 170 K/UL (ref 150–400)
PLATELET # BLD AUTO: 240 K/UL (ref 150–400)
PMV BLD AUTO: 10.6 FL (ref 8.9–12.9)
PMV BLD AUTO: 10.8 FL (ref 8.9–12.9)
POTASSIUM SERPL-SCNC: 3.5 MMOL/L (ref 3.5–5.1)
PROT SERPL-MCNC: 6 G/DL (ref 6.4–8.2)
PROTHROMBIN TIME: <10 SEC (ref 11.9–14.6)
RBC # BLD AUTO: 2.65 M/UL (ref 3.8–5.2)
RBC # BLD AUTO: 3.31 M/UL (ref 3.8–5.2)
RBC MORPH BLD: ABNORMAL
SODIUM SERPL-SCNC: 138 MMOL/L (ref 136–145)
WBC # BLD AUTO: 11.3 K/UL (ref 3.6–11)
WBC # BLD AUTO: 13.2 K/UL (ref 3.6–11)

## 2024-07-02 PROCEDURE — P9016 RBC LEUKOCYTES REDUCED: HCPCS

## 2024-07-02 PROCEDURE — 2000000000 HC ICU R&B

## 2024-07-02 PROCEDURE — 36415 COLL VENOUS BLD VENIPUNCTURE: CPT

## 2024-07-02 PROCEDURE — 86901 BLOOD TYPING SEROLOGIC RH(D): CPT

## 2024-07-02 PROCEDURE — 2580000003 HC RX 258: Performed by: EMERGENCY MEDICINE

## 2024-07-02 PROCEDURE — 3700000001 HC ADD 15 MINUTES (ANESTHESIA): Performed by: INTERNAL MEDICINE

## 2024-07-02 PROCEDURE — 3600007511: Performed by: INTERNAL MEDICINE

## 2024-07-02 PROCEDURE — 86923 COMPATIBILITY TEST ELECTRIC: CPT

## 2024-07-02 PROCEDURE — 99285 EMERGENCY DEPT VISIT HI MDM: CPT

## 2024-07-02 PROCEDURE — 96375 TX/PRO/DX INJ NEW DRUG ADDON: CPT

## 2024-07-02 PROCEDURE — 2580000003 HC RX 258: Performed by: PHYSICIAN ASSISTANT

## 2024-07-02 PROCEDURE — 85025 COMPLETE CBC W/AUTO DIFF WBC: CPT

## 2024-07-02 PROCEDURE — 6360000002 HC RX W HCPCS: Performed by: NURSE ANESTHETIST, CERTIFIED REGISTERED

## 2024-07-02 PROCEDURE — 0DJ08ZZ INSPECTION OF UPPER INTESTINAL TRACT, VIA NATURAL OR ARTIFICIAL OPENING ENDOSCOPIC: ICD-10-PCS | Performed by: INTERNAL MEDICINE

## 2024-07-02 PROCEDURE — 71045 X-RAY EXAM CHEST 1 VIEW: CPT

## 2024-07-02 PROCEDURE — 85610 PROTHROMBIN TIME: CPT

## 2024-07-02 PROCEDURE — 80053 COMPREHEN METABOLIC PANEL: CPT

## 2024-07-02 PROCEDURE — 6360000002 HC RX W HCPCS: Performed by: EMERGENCY MEDICINE

## 2024-07-02 PROCEDURE — 2709999900 HC NON-CHARGEABLE SUPPLY: Performed by: INTERNAL MEDICINE

## 2024-07-02 PROCEDURE — 96374 THER/PROPH/DIAG INJ IV PUSH: CPT

## 2024-07-02 PROCEDURE — 36430 TRANSFUSION BLD/BLD COMPNT: CPT

## 2024-07-02 PROCEDURE — 6360000002 HC RX W HCPCS

## 2024-07-02 PROCEDURE — 94761 N-INVAS EAR/PLS OXIMETRY MLT: CPT

## 2024-07-02 PROCEDURE — 6360000002 HC RX W HCPCS: Performed by: HOSPITALIST

## 2024-07-02 PROCEDURE — 87641 MR-STAPH DNA AMP PROBE: CPT

## 2024-07-02 PROCEDURE — 2500000003 HC RX 250 WO HCPCS: Performed by: NURSE ANESTHETIST, CERTIFIED REGISTERED

## 2024-07-02 PROCEDURE — 6360000002 HC RX W HCPCS: Performed by: PHYSICIAN ASSISTANT

## 2024-07-02 PROCEDURE — 86850 RBC ANTIBODY SCREEN: CPT

## 2024-07-02 PROCEDURE — 3700000000 HC ANESTHESIA ATTENDED CARE: Performed by: INTERNAL MEDICINE

## 2024-07-02 PROCEDURE — 86900 BLOOD TYPING SEROLOGIC ABO: CPT

## 2024-07-02 PROCEDURE — 3600007501: Performed by: INTERNAL MEDICINE

## 2024-07-02 PROCEDURE — 86920 COMPATIBILITY TEST SPIN: CPT

## 2024-07-02 RX ORDER — POTASSIUM CHLORIDE 7.45 MG/ML
10 INJECTION INTRAVENOUS PRN
Status: DISCONTINUED | OUTPATIENT
Start: 2024-07-02 | End: 2024-07-08 | Stop reason: HOSPADM

## 2024-07-02 RX ORDER — SODIUM CHLORIDE 0.9 % (FLUSH) 0.9 %
5-40 SYRINGE (ML) INJECTION PRN
Status: DISCONTINUED | OUTPATIENT
Start: 2024-07-02 | End: 2024-07-08 | Stop reason: HOSPADM

## 2024-07-02 RX ORDER — MAGNESIUM SULFATE IN WATER 40 MG/ML
2000 INJECTION, SOLUTION INTRAVENOUS PRN
Status: DISCONTINUED | OUTPATIENT
Start: 2024-07-02 | End: 2024-07-08 | Stop reason: HOSPADM

## 2024-07-02 RX ORDER — ACETAMINOPHEN 650 MG/1
650 SUPPOSITORY RECTAL EVERY 6 HOURS PRN
Status: DISCONTINUED | OUTPATIENT
Start: 2024-07-02 | End: 2024-07-03 | Stop reason: SDUPTHER

## 2024-07-02 RX ORDER — SODIUM CHLORIDE 9 MG/ML
INJECTION, SOLUTION INTRAVENOUS PRN
Status: DISCONTINUED | OUTPATIENT
Start: 2024-07-02 | End: 2024-07-08 | Stop reason: HOSPADM

## 2024-07-02 RX ORDER — ACETAMINOPHEN 325 MG/1
650 TABLET ORAL EVERY 6 HOURS PRN
Status: DISCONTINUED | OUTPATIENT
Start: 2024-07-02 | End: 2024-07-03 | Stop reason: SDUPTHER

## 2024-07-02 RX ORDER — ONDANSETRON 2 MG/ML
4 INJECTION INTRAMUSCULAR; INTRAVENOUS ONCE
Status: COMPLETED | OUTPATIENT
Start: 2024-07-02 | End: 2024-07-02

## 2024-07-02 RX ORDER — ONDANSETRON 2 MG/ML
4 INJECTION INTRAMUSCULAR; INTRAVENOUS EVERY 6 HOURS PRN
Status: DISCONTINUED | OUTPATIENT
Start: 2024-07-02 | End: 2024-07-03 | Stop reason: SDUPTHER

## 2024-07-02 RX ORDER — SODIUM CHLORIDE 0.9 % (FLUSH) 0.9 %
5-40 SYRINGE (ML) INJECTION EVERY 12 HOURS SCHEDULED
Status: DISCONTINUED | OUTPATIENT
Start: 2024-07-02 | End: 2024-07-08 | Stop reason: HOSPADM

## 2024-07-02 RX ORDER — POLYETHYLENE GLYCOL 3350 17 G/17G
17 POWDER, FOR SOLUTION ORAL DAILY PRN
Status: DISCONTINUED | OUTPATIENT
Start: 2024-07-02 | End: 2024-07-08 | Stop reason: HOSPADM

## 2024-07-02 RX ORDER — PROPOFOL 10 MG/ML
INJECTION, EMULSION INTRAVENOUS PRN
Status: DISCONTINUED | OUTPATIENT
Start: 2024-07-02 | End: 2024-07-02 | Stop reason: SDUPTHER

## 2024-07-02 RX ORDER — PROCHLORPERAZINE EDISYLATE 5 MG/ML
10 INJECTION INTRAMUSCULAR; INTRAVENOUS EVERY 6 HOURS PRN
Status: DISCONTINUED | OUTPATIENT
Start: 2024-07-02 | End: 2024-07-08 | Stop reason: HOSPADM

## 2024-07-02 RX ORDER — ONDANSETRON 2 MG/ML
INJECTION INTRAMUSCULAR; INTRAVENOUS
Status: COMPLETED
Start: 2024-07-02 | End: 2024-07-02

## 2024-07-02 RX ORDER — SUCCINYLCHOLINE/SOD CL,ISO/PF 200MG/10ML
SYRINGE (ML) INTRAVENOUS PRN
Status: DISCONTINUED | OUTPATIENT
Start: 2024-07-02 | End: 2024-07-02 | Stop reason: SDUPTHER

## 2024-07-02 RX ORDER — DEXAMETHASONE SODIUM PHOSPHATE 4 MG/ML
INJECTION, SOLUTION INTRA-ARTICULAR; INTRALESIONAL; INTRAMUSCULAR; INTRAVENOUS; SOFT TISSUE PRN
Status: DISCONTINUED | OUTPATIENT
Start: 2024-07-02 | End: 2024-07-02 | Stop reason: SDUPTHER

## 2024-07-02 RX ORDER — SODIUM CHLORIDE, SODIUM LACTATE, POTASSIUM CHLORIDE, CALCIUM CHLORIDE 600; 310; 30; 20 MG/100ML; MG/100ML; MG/100ML; MG/100ML
INJECTION, SOLUTION INTRAVENOUS CONTINUOUS
Status: DISCONTINUED | OUTPATIENT
Start: 2024-07-02 | End: 2024-07-04

## 2024-07-02 RX ORDER — ONDANSETRON 2 MG/ML
INJECTION INTRAMUSCULAR; INTRAVENOUS PRN
Status: DISCONTINUED | OUTPATIENT
Start: 2024-07-02 | End: 2024-07-02 | Stop reason: SDUPTHER

## 2024-07-02 RX ORDER — HYDRALAZINE HYDROCHLORIDE 20 MG/ML
10 INJECTION INTRAMUSCULAR; INTRAVENOUS EVERY 6 HOURS PRN
Status: DISCONTINUED | OUTPATIENT
Start: 2024-07-02 | End: 2024-07-08 | Stop reason: HOSPADM

## 2024-07-02 RX ORDER — ONDANSETRON 4 MG/1
4 TABLET, ORALLY DISINTEGRATING ORAL EVERY 8 HOURS PRN
Status: DISCONTINUED | OUTPATIENT
Start: 2024-07-02 | End: 2024-07-03 | Stop reason: SDUPTHER

## 2024-07-02 RX ORDER — LIDOCAINE HYDROCHLORIDE 20 MG/ML
INJECTION, SOLUTION EPIDURAL; INFILTRATION; INTRACAUDAL; PERINEURAL PRN
Status: DISCONTINUED | OUTPATIENT
Start: 2024-07-02 | End: 2024-07-02 | Stop reason: SDUPTHER

## 2024-07-02 RX ORDER — POTASSIUM CHLORIDE 20 MEQ/1
40 TABLET, EXTENDED RELEASE ORAL PRN
Status: DISCONTINUED | OUTPATIENT
Start: 2024-07-02 | End: 2024-07-08 | Stop reason: HOSPADM

## 2024-07-02 RX ADMIN — PROCHLORPERAZINE EDISYLATE 10 MG: 5 INJECTION INTRAMUSCULAR; INTRAVENOUS at 23:41

## 2024-07-02 RX ADMIN — SODIUM CHLORIDE, POTASSIUM CHLORIDE, SODIUM LACTATE AND CALCIUM CHLORIDE: 600; 310; 30; 20 INJECTION, SOLUTION INTRAVENOUS at 16:10

## 2024-07-02 RX ADMIN — Medication 80 MG: at 15:37

## 2024-07-02 RX ADMIN — ONDANSETRON 4 MG: 2 INJECTION INTRAMUSCULAR; INTRAVENOUS at 11:46

## 2024-07-02 RX ADMIN — ONDANSETRON 4 MG: 2 INJECTION INTRAMUSCULAR; INTRAVENOUS at 10:54

## 2024-07-02 RX ADMIN — DEXAMETHASONE SODIUM PHOSPHATE 4 MG: 4 INJECTION, SOLUTION INTRA-ARTICULAR; INTRALESIONAL; INTRAMUSCULAR; INTRAVENOUS; SOFT TISSUE at 15:33

## 2024-07-02 RX ADMIN — SODIUM CHLORIDE, PRESERVATIVE FREE 40 MG: 5 INJECTION INTRAVENOUS at 23:41

## 2024-07-02 RX ADMIN — ONDANSETRON 4 MG: 2 INJECTION INTRAMUSCULAR; INTRAVENOUS at 21:18

## 2024-07-02 RX ADMIN — ONDANSETRON 4 MG: 2 INJECTION INTRAMUSCULAR; INTRAVENOUS at 15:33

## 2024-07-02 RX ADMIN — ONDANSETRON 4 MG: 2 INJECTION INTRAMUSCULAR; INTRAVENOUS at 12:45

## 2024-07-02 RX ADMIN — SODIUM CHLORIDE, POTASSIUM CHLORIDE, SODIUM LACTATE AND CALCIUM CHLORIDE: 600; 310; 30; 20 INJECTION, SOLUTION INTRAVENOUS at 15:33

## 2024-07-02 RX ADMIN — LIDOCAINE HYDROCHLORIDE 60 MG: 20 INJECTION, SOLUTION EPIDURAL; INFILTRATION; INTRACAUDAL; PERINEURAL at 15:37

## 2024-07-02 RX ADMIN — SODIUM CHLORIDE, PRESERVATIVE FREE 10 ML: 5 INJECTION INTRAVENOUS at 21:34

## 2024-07-02 RX ADMIN — PROPOFOL 100 MG: 10 INJECTION, EMULSION INTRAVENOUS at 15:37

## 2024-07-02 RX ADMIN — SODIUM CHLORIDE, PRESERVATIVE FREE 40 MG: 5 INJECTION INTRAVENOUS at 12:45

## 2024-07-02 ASSESSMENT — PAIN SCALES - GENERAL
PAINLEVEL_OUTOF10: 0
PAINLEVEL_OUTOF10: 0

## 2024-07-02 ASSESSMENT — LIFESTYLE VARIABLES
HOW MANY STANDARD DRINKS CONTAINING ALCOHOL DO YOU HAVE ON A TYPICAL DAY: PATIENT DOES NOT DRINK
HOW OFTEN DO YOU HAVE A DRINK CONTAINING ALCOHOL: NEVER

## 2024-07-02 NOTE — ANESTHESIA PRE PROCEDURE
AM    CALCIUM 8.9 07/02/2024 10:45 AM    BILITOT 0.5 07/02/2024 10:45 AM    ALKPHOS 62 07/02/2024 10:45 AM    AST 32 07/02/2024 10:45 AM    ALT 15 07/02/2024 10:45 AM       POC Tests: No results for input(s): \"POCGLU\", \"POCNA\", \"POCK\", \"POCCL\", \"POCBUN\", \"POCHEMO\", \"POCHCT\" in the last 72 hours.    Coags:   Lab Results   Component Value Date/Time    PROTIME <10.0 07/02/2024 10:45 AM    INR <0.8 07/02/2024 10:45 AM       HCG (If Applicable): No results found for: \"PREGTESTUR\", \"PREGSERUM\", \"HCG\", \"HCGQUANT\"     ABGs: No results found for: \"PHART\", \"PO2ART\", \"EGZ6OUF\", \"HZP8EOE\", \"BEART\", \"E3KTRGPC\"     Type & Screen (If Applicable):  No results found for: \"LABABO\"    Drug/Infectious Status (If Applicable):  No results found for: \"HIV\", \"HEPCAB\"    COVID-19 Screening (If Applicable): No results found for: \"COVID19\"        Anesthesia Evaluation  Patient summary reviewed and Nursing notes reviewed  Airway: Mallampati: III          Dental:    (+) edentulous      Pulmonary:normal exam    (+)  COPD:                                     Cardiovascular:    (+) hypertension:        Rhythm: regular  Rate: normal                    Neuro/Psych:   (+) CVA:, psychiatric history:depression/anxiety dementia             ROS comment: Blind both eyes GI/Hepatic/Renal:   (+) renal disease: kidney stones          Endo/Other:                     Abdominal:             Vascular:          Other Findings:           Anesthesia Plan      general     ASA 4 - emergent     (Standard ASA monitors: continuous EKG, BP, HR, pulse oximeter, temperature, and capnography.)  Induction: intravenous.    MIPS: Postoperative opioids intended and Prophylactic antiemetics administered.  Anesthetic plan and risks discussed with spouse, legal guardian and healthcare power of  (and family, if present.).      Plan discussed with CRNA.                  Radha Lr MD   7/2/2024

## 2024-07-02 NOTE — ED TRIAGE NOTES
Pt transferred from [facility] pt has one unit of RBC from [facility] running at 250ml at this time, pt has been vomiting blood and having blood in her stool

## 2024-07-02 NOTE — ED TRIAGE NOTES
Patient was over in outpatient for day 1 of  a 3 day iron infusion regimen when she started vomiting and having copious amounts of black tarry stool patient cleaned and brought to ER for evaluation patient vomiting in triage

## 2024-07-02 NOTE — ED NOTES
Cleaned pt up and changed linens, and gown pt vomited dark colored emesis and had dark tarry stool  
Pt began vomiting and appeared to be aspirating. Nurse and tech set up suction to clear air way and once cleared, pt swatted Younker away. Pt was breathing clearly and primary nurse notified.  
    Deterioration Index (DI): Deterioration Index: 38.36  Deterioration Index (DI) Interventions Performed:    O2 Flow Rate:    O2 Device: O2 Device: None (Room air)  Cardiac Rhythm:    Critical Lab Results: [unfilled]  Cultures: Cultures:  NIH Score: NIH NIH Stroke Scale  Interval: Reassessment  Level of Consciousness (1a): Alert  LOC Questions (1b): Answers both correctly  LOC Commands (1c): Performs both tasks correctly  Best Gaze (2): Normal  Visual (3): No visual loss  Facial Palsy (4): Normal symmetrical movement  Motor Arm, Left (5a): No drift  Motor Arm, Right (5b): No drift  Motor Leg, Left (6a): No drift  Motor Leg, Right (6b): No drift  Limb Ataxia (7): Absent  Sensory (8): Normal  Dysarthria (10): Normal  Extinction and Inattention (11): No abnormality   Active LDA's:   Peripheral IV 07/02/24 Left Forearm (Active)       Peripheral IV 07/02/24 Right;Dorsal Wrist (Active)     Active Central Lines:                          Active Wounds:    Active Jj's:    Active Feeding Tubes:      Administered Medications:   Medications   pantoprazole (PROTONIX) 40 mg in sodium chloride (PF) 0.9 % 10 mL injection (40 mg IntraVENous Given 7/2/24 1245)   sodium chloride flush 0.9 % injection 5-40 mL (has no administration in time range)   sodium chloride flush 0.9 % injection 5-40 mL (has no administration in time range)   0.9 % sodium chloride infusion (has no administration in time range)   potassium chloride (KLOR-CON M) extended release tablet 40 mEq (has no administration in time range)     Or   potassium bicarb-citric acid (EFFER-K) effervescent tablet 40 mEq (has no administration in time range)     Or   potassium chloride 10 mEq/100 mL IVPB (Peripheral Line) (has no administration in time range)   magnesium sulfate 2000 mg in 50 mL IVPB premix (has no administration in time range)   ondansetron (ZOFRAN-ODT) disintegrating tablet 4 mg (has no administration in time range)     Or   ondansetron (ZOFRAN) injection 4 mg

## 2024-07-02 NOTE — ED PROVIDER NOTES
[facility] EMERGENCY DEPT  EMERGENCY DEPARTMENT HISTORY AND PHYSICAL EXAM      Date: 7/2/2024  Patient Name: Jimena Sepulveda  MRN: [id number]  Birthdate 1955  Date of evaluation: 7/2/2024  Provider: Joan Peña MD   Note Started: 10:44 AM EDT 7/2/24    HISTORY OF PRESENT ILLNESS     Chief Complaint   Patient presents with    GI Bleeding       History Provided By: patient    HPI: Jimena Sepulveda is a 69 y.o. female with PMH as below, significant for blindness, recent colonoscopy 6/8/2024 presenting with melena. Pt was getting iron infusion outpatient today when she started having melena and vomiting. No hematemesis, brown not coffee-ground. Pt endorsing abd pain as well. History limited 2/2 active vomiting.    PAST MEDICAL HISTORY   Past Medical History:  Past Medical History:   Diagnosis Date    Adjustment disorder with mixed disturbance of emotions and conduct     Anemia     Blind     Blood in stool 06/02/2023    Calculus of kidney 03/24/2024    Calculus of ureter 03/21/2024    Cerebral infarction (HCC)     Chronic obstructive pulmonary disease (HCC)     Chronic respiratory failure with hypoxia (HCC)     Coagulation defect (HCC)     Cognitive communication deficit     Dementia (HCC)     Depression     Diarrhea     History of colonic polyps 03/08/2023    vcu    Hypertension     Lower abdominal pain     Muscle weakness     Stroke (HCC) 2018    Thyroid disorder     UTI (urinary tract infection) 03/06/2024    Visual loss        Past Surgical History:  Past Surgical History:   Procedure Laterality Date    COLONOSCOPY N/A     march 8, 2023 at [facility]   colon polyps    COLONOSCOPY N/A 06/08/2023    COLONOSCOPY WITH HOT SNARE POLYPECTOMY  performed by Jh Pelayo Jr., MD at [facility] ENDOSCOPY    CYSTOSCOPY Right 03/17/2024    CYSTOSCOPY, RIGHT DOUBLE J STENT PLACEMENT performed by Doc Diaz MD at [facility] MAIN OR    CYSTOSCOPY Right 04/09/2024    CYSTOURETHROSCOPY, RIGHT URETEROSCOPY, LASER LITHOTRIPSY, URETERAL

## 2024-07-02 NOTE — CONSENT
Informed Consent for Blood Component Transfusion Note    I have discussed with the Spouse, Vignesh Sepulveda the rationale for blood component transfusion; its benefits in treating or preventing fatigue, organ damage, or death; and its risk which includes mild transfusion reactions, rare risk of blood borne infection, or more serious but rare reactions. I have discussed the alternatives to transfusion, including the risk and consequences of not receiving transfusion. The Spouse had an opportunity to ask questions and had agreed to proceed with transfusion of blood components.    Electronically signed by Eugene Hammond PA-C on 7/2/24 at 2:12 PM EDT

## 2024-07-02 NOTE — ED NOTES
Pt observed first 15 minutes of transfusion ,pt tolerating without complication no s/s of reaction, pt is now ready to be transported to [place]

## 2024-07-02 NOTE — ED PROVIDER NOTES
Divison 00     Dispense Status Blood Bank Allocated     Transfusion Status PENDING     Crossmatch Result PENDING        EKG:.See ED Course Below    Radiologic Studies:  Non-plain film images such as CT, Ultrasound and MRI are read by the radiologist. Plain radiographic images are visualized and preliminarily interpreted by the ED Provider with the following findings: See ED Course Below    Interpretation per the Radiologist below, if available at the time of this note:  No orders to display        ED COURSE and DIFFERENTIAL DIAGNOSIS/MDM   12:43 PM Differential and Considerations: 69-year-old female presents with hematemesis and melena concerning for GI bleed.  Already received 1 unit of blood improved blood pressure.  Will recheck hemoglobin.  GI has been consulted.  Protonix.  Admission to the hospitalist service is indicated.    Records Reviewed (source and summary of external notes): Prior medical records and Nursing notes.    Vitals:    Vitals:    07/02/24 1233 07/02/24 1234 07/02/24 1238   BP:  (!) 172/76    Pulse:  91    Resp:  18    Temp:   98.6 °F (37 °C)   SpO2:  100%    Weight: 87.1 kg (192 lb)     Height: 1.626 m (5' 4\")          ED COURSE       SEPSIS Reassessment: Sepsis reassessment not applicable    Clinical Management Tools:  Not Applicable    Patient was given the following medications:  Medications   pantoprazole (PROTONIX) 40 mg in sodium chloride (PF) 0.9 % 10 mL injection (has no administration in time range)   ondansetron (ZOFRAN) injection 4 mg (has no administration in time range)       CONSULTS: See ED Course/MDM for further details.  IP CONSULT TO GI     Social Determinants affecting Diagnosis/Treatment: None    Smoking Cessation: Not Applicable    PROCEDURES   Unless otherwise noted above, none  Procedures      CRITICAL CARE TIME   Patient does not meet Critical Care Time, 0 minutes    ED IMPRESSION     1. Gastrointestinal hemorrhage, unspecified gastrointestinal hemorrhage type    2.

## 2024-07-02 NOTE — H&P
Ratio 0.9 (L) 1.1 - 2.2     TYPE AND SCREEN    Collection Time: 07/02/24 10:45 AM   Result Value Ref Range    Crossmatch expiration date 07/05/2024,2359     ABO/Rh O Positive     Antibody Screen Negative     Unit Number [id number]     Product Code Blood Bank RC LR,2     Unit Divison 00     Dispense Status Blood Bank Issued     Unit Issue Date/Time [id number]     Product Code Blood Bank [id number]     Blood Bank Unit Type and Rh O NEG     Blood Bank ISBT Product Blood Type 9500     Blood Bank Blood Product Expiration Date 681423506549     Transfusion Status Ok to transfuse     Crossmatch Result Compatible          CT Result (most recent):  CT ABDOMEN PELVIS W IV CONTRAST 06/25/2024    Narrative  EXAM: CT ABDOMEN PELVIS W IV CONTRAST    INDICATION: Right upper quadrant pain; Nausea    COMPARISON: 3.17.2024    CONTRAST:  100   mL of Isovue-370.    ORAL CONTRAST: present    TECHNIQUE:  Following intravenous administration of contrast, thin axial images were  obtained through the abdomen and pelvis. Coronal and sagittal reconstructions  were generated. CT dose reduction was achieved through use of a standardized  protocol tailored for this examination and automatic exposure control for dose  modulation.    FINDINGS:  LOWER THORAX: No significant abnormality in the incidentally imaged lower chest.  Coronary artery atherosclerosis.  LIVER: No mass. Decreased density of the liver.  BILIARY TREE: Gallbladder is within normal limits. CBD is not dilated.  SPLEEN: within normal limits.  PANCREAS: No mass or ductal dilatation.  ADRENALS: Unremarkable.  KIDNEYS: No mass, calculus, or hydronephrosis.  STOMACH: Unremarkable.  SMALL BOWEL: No dilatation or wall thickening.  COLON: No dilatation or wall thickening.  APPENDIX: Normal on axial image 65  PERITONEUM: No ascites or pneumoperitoneum.  RETROPERITONEUM: No lymphadenopathy or aortic aneurysm. Moderate iliac  calcification.  REPRODUCTIVE ORGANS: Hysterectomy  URINARY

## 2024-07-03 ENCOUNTER — APPOINTMENT (OUTPATIENT)
Facility: HOSPITAL | Age: 69
DRG: 377 | End: 2024-07-03
Payer: MEDICARE

## 2024-07-03 LAB
ABO + RH BLD: NORMAL
ALBUMIN SERPL-MCNC: 2.5 G/DL (ref 3.5–5)
ALBUMIN/GLOB SERPL: 0.9 (ref 1.1–2.2)
ALP SERPL-CCNC: 50 U/L (ref 45–117)
ALT SERPL-CCNC: 18 U/L (ref 12–78)
ANION GAP SERPL CALC-SCNC: 11 MMOL/L (ref 5–15)
APTT PPP: 31.6 SEC (ref 21.2–34.1)
APTT PPP: 55.1 SEC (ref 21.2–34.1)
APTT PPP: 56.1 SEC (ref 21.2–34.1)
AST SERPL W P-5'-P-CCNC: 47 U/L (ref 15–37)
BASOPHILS # BLD: 0 K/UL (ref 0–0.1)
BASOPHILS # BLD: 0 K/UL (ref 0–0.1)
BASOPHILS NFR BLD: 0 % (ref 0–1)
BASOPHILS NFR BLD: 0 % (ref 0–1)
BILIRUB SERPL-MCNC: 0.6 MG/DL (ref 0.2–1)
BLD PROD TYP BPU: NORMAL
BLOOD BANK BLOOD PRODUCT EXPIRATION DATE: NORMAL
BLOOD BANK DISPENSE STATUS: NORMAL
BLOOD BANK ISBT PRODUCT BLOOD TYPE: 9500
BLOOD BANK PRODUCT CODE: NORMAL
BLOOD BANK UNIT TYPE AND RH: NORMAL
BLOOD GROUP ANTIBODIES SERPL: NEGATIVE
BPU ID: NORMAL
BUN SERPL-MCNC: 27 MG/DL (ref 6–20)
BUN/CREAT SERPL: 27 (ref 12–20)
CA-I BLD-MCNC: 7.6 MG/DL (ref 8.5–10.1)
CHLORIDE SERPL-SCNC: 110 MMOL/L (ref 97–108)
CO2 SERPL-SCNC: 21 MMOL/L (ref 21–32)
CREAT SERPL-MCNC: 1.01 MG/DL (ref 0.55–1.02)
CROSSMATCH RESULT: NORMAL
DIFFERENTIAL METHOD BLD: ABNORMAL
DIFFERENTIAL METHOD BLD: ABNORMAL
EOSINOPHIL # BLD: 0 K/UL (ref 0–0.4)
EOSINOPHIL # BLD: 0 K/UL (ref 0–0.4)
EOSINOPHIL NFR BLD: 0 % (ref 0–7)
EOSINOPHIL NFR BLD: 0 % (ref 0–7)
ERYTHROCYTE [DISTWIDTH] IN BLOOD BY AUTOMATED COUNT: 21.7 % (ref 11.5–14.5)
ERYTHROCYTE [DISTWIDTH] IN BLOOD BY AUTOMATED COUNT: 22.4 % (ref 11.5–14.5)
FIBRINOGEN PPP-MCNC: 348 MG/DL (ref 220–535)
GLOBULIN SER CALC-MCNC: 2.7 G/DL (ref 2–4)
GLUCOSE SERPL-MCNC: 149 MG/DL (ref 65–100)
HCT VFR BLD AUTO: 24.2 % (ref 35–47)
HCT VFR BLD AUTO: 27.1 % (ref 35–47)
HGB BLD-MCNC: 7.9 G/DL (ref 11.5–16)
HGB BLD-MCNC: 8.4 G/DL (ref 11.5–16)
IMM GRANULOCYTES # BLD AUTO: 0.1 K/UL (ref 0–0.04)
IMM GRANULOCYTES # BLD AUTO: 0.1 K/UL (ref 0–0.04)
IMM GRANULOCYTES NFR BLD AUTO: 1 % (ref 0–0.5)
IMM GRANULOCYTES NFR BLD AUTO: 1 % (ref 0–0.5)
INR PPP: 1.6 (ref 0.9–1.1)
INR PPP: 6 (ref 0.9–1.1)
INR PPP: 6 (ref 0.9–1.1)
IRON SATN MFR SERPL: 4 % (ref 20–50)
IRON SERPL-MCNC: 21 UG/DL (ref 35–150)
LYMPHOCYTES # BLD: 0.4 K/UL (ref 0.8–3.5)
LYMPHOCYTES # BLD: 0.8 K/UL (ref 0.8–3.5)
LYMPHOCYTES NFR BLD: 12 % (ref 12–49)
LYMPHOCYTES NFR BLD: 6 % (ref 12–49)
MCH RBC QN AUTO: 25.1 PG (ref 26–34)
MCH RBC QN AUTO: 25.2 PG (ref 26–34)
MCHC RBC AUTO-ENTMCNC: 31 G/DL (ref 30–36.5)
MCHC RBC AUTO-ENTMCNC: 32.6 G/DL (ref 30–36.5)
MCV RBC AUTO: 77.3 FL (ref 80–99)
MCV RBC AUTO: 81.1 FL (ref 80–99)
MONOCYTES # BLD: 0.8 K/UL (ref 0–1)
MONOCYTES # BLD: 1.1 K/UL (ref 0–1)
MONOCYTES NFR BLD: 14 % (ref 5–13)
MONOCYTES NFR BLD: 16 % (ref 5–13)
NEUTS SEG # BLD: 4.7 K/UL (ref 1.8–8)
NEUTS SEG # BLD: 4.8 K/UL (ref 1.8–8)
NEUTS SEG NFR BLD: 71 % (ref 32–75)
NEUTS SEG NFR BLD: 79 % (ref 32–75)
NRBC # BLD: 0 K/UL (ref 0–0.01)
NRBC # BLD: 0.02 K/UL (ref 0–0.01)
NRBC BLD-RTO: 0 PER 100 WBC
NRBC BLD-RTO: 0.3 PER 100 WBC
PLATELET # BLD AUTO: 164 K/UL (ref 150–400)
PLATELET # BLD AUTO: 166 K/UL (ref 150–400)
PMV BLD AUTO: 10.2 FL (ref 8.9–12.9)
PMV BLD AUTO: 9.9 FL (ref 8.9–12.9)
POTASSIUM SERPL-SCNC: 3.3 MMOL/L (ref 3.5–5.1)
PROT SERPL-MCNC: 5.2 G/DL (ref 6.4–8.2)
PROTHROMBIN TIME: 19.2 SEC (ref 11.9–14.6)
PROTHROMBIN TIME: 53.6 SEC (ref 11.9–14.6)
PROTHROMBIN TIME: 53.9 SEC (ref 11.9–14.6)
RBC # BLD AUTO: 3.13 M/UL (ref 3.8–5.2)
RBC # BLD AUTO: 3.34 M/UL (ref 3.8–5.2)
RBC MORPH BLD: ABNORMAL
SODIUM SERPL-SCNC: 142 MMOL/L (ref 136–145)
SPECIMEN EXP DATE BLD: NORMAL
THERAPEUTIC RANGE: ABNORMAL SEC (ref 82–109)
THERAPEUTIC RANGE: ABNORMAL SEC (ref 82–109)
THERAPEUTIC RANGE: NORMAL SEC (ref 82–109)
TIBC SERPL-MCNC: 476 UG/DL (ref 250–450)
TRANSFUSION STATUS PATIENT QL: NORMAL
UNIT DIVISION: 0
UNIT ISSUE DATE/TIME: NORMAL
WBC # BLD AUTO: 6 K/UL (ref 3.6–11)
WBC # BLD AUTO: 6.8 K/UL (ref 3.6–11)

## 2024-07-03 PROCEDURE — 74178 CT ABD&PLV WO CNTR FLWD CNTR: CPT

## 2024-07-03 PROCEDURE — 2580000003 HC RX 258: Performed by: INTERNAL MEDICINE

## 2024-07-03 PROCEDURE — 85610 PROTHROMBIN TIME: CPT

## 2024-07-03 PROCEDURE — 36430 TRANSFUSION BLD/BLD COMPNT: CPT

## 2024-07-03 PROCEDURE — 85730 THROMBOPLASTIN TIME PARTIAL: CPT

## 2024-07-03 PROCEDURE — 80053 COMPREHEN METABOLIC PANEL: CPT

## 2024-07-03 PROCEDURE — 2580000003 HC RX 258: Performed by: PHYSICIAN ASSISTANT

## 2024-07-03 PROCEDURE — P9059 PLASMA, FRZ BETWEEN 8-24HOUR: HCPCS

## 2024-07-03 PROCEDURE — 85025 COMPLETE CBC W/AUTO DIFF WBC: CPT

## 2024-07-03 PROCEDURE — 85384 FIBRINOGEN ACTIVITY: CPT

## 2024-07-03 PROCEDURE — 30233L1 TRANSFUSION OF NONAUTOLOGOUS FRESH PLASMA INTO PERIPHERAL VEIN, PERCUTANEOUS APPROACH: ICD-10-PCS | Performed by: HOSPITALIST

## 2024-07-03 PROCEDURE — 87324 CLOSTRIDIUM AG IA: CPT

## 2024-07-03 PROCEDURE — 6360000002 HC RX W HCPCS: Performed by: PHYSICIAN ASSISTANT

## 2024-07-03 PROCEDURE — 85240 CLOT FACTOR VIII AHG 1 STAGE: CPT

## 2024-07-03 PROCEDURE — 85230 CLOT FACTOR VII PROCONVERTIN: CPT

## 2024-07-03 PROCEDURE — 6360000002 HC RX W HCPCS: Performed by: INTERNAL MEDICINE

## 2024-07-03 PROCEDURE — 6360000004 HC RX CONTRAST MEDICATION: Performed by: PHYSICIAN ASSISTANT

## 2024-07-03 PROCEDURE — 30233N1 TRANSFUSION OF NONAUTOLOGOUS RED BLOOD CELLS INTO PERIPHERAL VEIN, PERCUTANEOUS APPROACH: ICD-10-PCS | Performed by: HOSPITALIST

## 2024-07-03 PROCEDURE — 85220 BLOOC CLOT FACTOR V TEST: CPT

## 2024-07-03 PROCEDURE — 87449 NOS EACH ORGANISM AG IA: CPT

## 2024-07-03 PROCEDURE — 83540 ASSAY OF IRON: CPT

## 2024-07-03 PROCEDURE — 94761 N-INVAS EAR/PLS OXIMETRY MLT: CPT

## 2024-07-03 PROCEDURE — 2000000000 HC ICU R&B

## 2024-07-03 PROCEDURE — 85250 CLOT FACTOR IX PTC/CHRSTMAS: CPT

## 2024-07-03 PROCEDURE — 36415 COLL VENOUS BLD VENIPUNCTURE: CPT

## 2024-07-03 RX ORDER — ACETAMINOPHEN 650 MG/1
650 SUPPOSITORY RECTAL EVERY 6 HOURS PRN
Status: DISCONTINUED | OUTPATIENT
Start: 2024-07-03 | End: 2024-07-08 | Stop reason: HOSPADM

## 2024-07-03 RX ORDER — PHYTONADIONE 1 MG/.5ML
1 INJECTION, EMULSION INTRAMUSCULAR; INTRAVENOUS; SUBCUTANEOUS ONCE
Status: DISCONTINUED | OUTPATIENT
Start: 2024-07-03 | End: 2024-07-03

## 2024-07-03 RX ORDER — SODIUM CHLORIDE 9 MG/ML
INJECTION, SOLUTION INTRAVENOUS PRN
Status: DISCONTINUED | OUTPATIENT
Start: 2024-07-03 | End: 2024-07-08 | Stop reason: HOSPADM

## 2024-07-03 RX ORDER — ONDANSETRON 2 MG/ML
4 INJECTION INTRAMUSCULAR; INTRAVENOUS EVERY 6 HOURS PRN
Status: DISCONTINUED | OUTPATIENT
Start: 2024-07-03 | End: 2024-07-08 | Stop reason: HOSPADM

## 2024-07-03 RX ORDER — ACETAMINOPHEN 325 MG/1
650 TABLET ORAL EVERY 6 HOURS PRN
Status: DISCONTINUED | OUTPATIENT
Start: 2024-07-03 | End: 2024-07-08 | Stop reason: HOSPADM

## 2024-07-03 RX ORDER — ONDANSETRON 4 MG/1
4 TABLET, ORALLY DISINTEGRATING ORAL EVERY 8 HOURS PRN
Status: DISCONTINUED | OUTPATIENT
Start: 2024-07-03 | End: 2024-07-08 | Stop reason: HOSPADM

## 2024-07-03 RX ORDER — SODIUM CHLORIDE 0.9 % (FLUSH) 0.9 %
5-40 SYRINGE (ML) INJECTION PRN
Status: DISCONTINUED | OUTPATIENT
Start: 2024-07-03 | End: 2024-07-08 | Stop reason: HOSPADM

## 2024-07-03 RX ORDER — SODIUM CHLORIDE 9 MG/ML
INJECTION, SOLUTION INTRAVENOUS PRN
Status: DISCONTINUED | OUTPATIENT
Start: 2024-07-03 | End: 2024-07-05

## 2024-07-03 RX ORDER — PHYTONADIONE 5 MG/1
10 TABLET ORAL DAILY
Status: DISCONTINUED | OUTPATIENT
Start: 2024-07-03 | End: 2024-07-03

## 2024-07-03 RX ORDER — SODIUM CHLORIDE 0.9 % (FLUSH) 0.9 %
5-40 SYRINGE (ML) INJECTION EVERY 12 HOURS SCHEDULED
Status: DISCONTINUED | OUTPATIENT
Start: 2024-07-03 | End: 2024-07-08 | Stop reason: HOSPADM

## 2024-07-03 RX ADMIN — ONDANSETRON 4 MG: 2 INJECTION INTRAMUSCULAR; INTRAVENOUS at 08:53

## 2024-07-03 RX ADMIN — SODIUM CHLORIDE, POTASSIUM CHLORIDE, SODIUM LACTATE AND CALCIUM CHLORIDE: 600; 310; 30; 20 INJECTION, SOLUTION INTRAVENOUS at 13:14

## 2024-07-03 RX ADMIN — SODIUM CHLORIDE, POTASSIUM CHLORIDE, SODIUM LACTATE AND CALCIUM CHLORIDE: 600; 310; 30; 20 INJECTION, SOLUTION INTRAVENOUS at 02:34

## 2024-07-03 RX ADMIN — SODIUM CHLORIDE, PRESERVATIVE FREE 40 MG: 5 INJECTION INTRAVENOUS at 13:11

## 2024-07-03 RX ADMIN — PHYTONADIONE 10 MG: 10 INJECTION, EMULSION INTRAMUSCULAR; INTRAVENOUS; SUBCUTANEOUS at 21:58

## 2024-07-03 RX ADMIN — SODIUM CHLORIDE, PRESERVATIVE FREE 10 ML: 5 INJECTION INTRAVENOUS at 08:54

## 2024-07-03 RX ADMIN — PHYTONADIONE 5 MG: 10 INJECTION, EMULSION INTRAMUSCULAR; INTRAVENOUS; SUBCUTANEOUS at 16:12

## 2024-07-03 RX ADMIN — IOPAMIDOL 100 ML: 755 INJECTION, SOLUTION INTRAVENOUS at 12:01

## 2024-07-03 RX ADMIN — SODIUM CHLORIDE, PRESERVATIVE FREE 10 ML: 5 INJECTION INTRAVENOUS at 21:59

## 2024-07-03 ASSESSMENT — PAIN SCALES - GENERAL: PAINLEVEL_OUTOF10: 0

## 2024-07-03 NOTE — CONSULTS
32.96 kg/m²      Skin:  Extremities and face reveal no rashes. No higuera erythema. No telangiectasias on the chest wall.  HEENT: Sclerae anicteric. Extra-occular muscles are intact. No oral ulcers.  No abnormal pigmentation of the lips. The neck is supple.  Cardiovascular: Regular rate and rhythm.  Respiratory:  Comfortable breathing with no accessory muscle use.  GI:  Abdomen nondistended, soft, and nontender.  Normal active bowel sounds. No enlargement of the liver or spleen. No masses palpable.  Rectal:  Deferred  Musculoskeletal:  No pitting edema of the lower legs. Extremities have good range of motion.  No costovertebral tenderness.  Neurological:  Gross memory appears intact.  Patient is alert and oriented.  Psychiatric:  Mood appears appropriate with judgement intact.  Lymphatic:  No cervical or supraclavicular adenopathy.    Lab/Data Review:  Recent Results (from the past 24 hour(s))   Protime-INR    Collection Time: 07/02/24 10:45 AM   Result Value Ref Range    Protime <10.0 (L) 11.9 - 14.6 sec    INR <0.8 (L) 0.9 - 1.1   CBC with Auto Differential    Collection Time: 07/02/24 10:45 AM   Result Value Ref Range    WBC 11.3 (H) 3.6 - 11.0 K/uL    RBC 2.65 (L) 3.80 - 5.20 M/uL    Hemoglobin 5.7 (LL) 11.5 - 16.0 g/dL    Hematocrit 20.2 (L) 35.0 - 47.0 %    MCV 76.2 (L) 80.0 - 99.0 FL    MCH 21.5 (L) 26.0 - 34.0 PG    MCHC 28.2 (L) 30.0 - 36.5 g/dL    RDW 26.0 (H) 11.5 - 14.5 %    Platelets 240 150 - 400 K/uL    MPV 10.8 8.9 - 12.9 FL    Nucleated RBCs 0.4 (H) 0.0  WBC    nRBC 0.04 (H) 0.00 - 0.01 K/uL    Neutrophils % 72 32 - 75 %    Lymphocytes % 15 12 - 49 %    Monocytes % 10 5 - 13 %    Eosinophils % 1 0 - 7 %    Basophils % 0 0 - 1 %    Immature Granulocytes % 2 (H) 0 - 0.5 %    Neutrophils Absolute 8.2 (H) 1.8 - 8.0 K/UL    Lymphocytes Absolute 1.6 0.8 - 3.5 K/UL    Monocytes Absolute 1.2 (H) 0.0 - 1.0 K/UL    Eosinophils Absolute 0.1 0.0 - 0.4 K/UL    Basophils Absolute 0.1 0.0 - 0.1 K/UL    
duodenitis.  -GI following; on Protonix  -Continue to monitor CBC and for any more bleeding  -Transfuse for hemoglobin less than 7  -recommend iv iron infusions as she has marked iron deficiency from bleeding.   -agree with check for c-diff due to smell and appearance.       Thank you for the consult.    Dr. Watson spoke with the  and he states he had no knowledge of any clotting disorder and denied her being on any anticoagulation medication.    Signed By: JILLIAN Bhatt - CNP     July 3, 2024

## 2024-07-04 LAB
ALBUMIN SERPL-MCNC: 2.3 G/DL (ref 3.5–5)
ALBUMIN/GLOB SERPL: 0.9 (ref 1.1–2.2)
ALP SERPL-CCNC: 49 U/L (ref 45–117)
ALT SERPL-CCNC: 17 U/L (ref 12–78)
ANION GAP SERPL CALC-SCNC: 5 MMOL/L (ref 5–15)
APTT PPP: 29.3 SEC (ref 21.2–34.1)
AST SERPL W P-5'-P-CCNC: 31 U/L (ref 15–37)
BASOPHILS # BLD: 0 K/UL (ref 0–0.1)
BASOPHILS NFR BLD: 0 % (ref 0–1)
BILIRUB SERPL-MCNC: 0.7 MG/DL (ref 0.2–1)
BLD PROD TYP BPU: NORMAL
BLOOD BANK BLOOD PRODUCT EXPIRATION DATE: NORMAL
BLOOD BANK DISPENSE STATUS: NORMAL
BLOOD BANK ISBT PRODUCT BLOOD TYPE: 5100
BLOOD BANK PRODUCT CODE: NORMAL
BLOOD BANK UNIT TYPE AND RH: NORMAL
BPU ID: NORMAL
BUN SERPL-MCNC: 16 MG/DL (ref 6–20)
BUN/CREAT SERPL: 23 (ref 12–20)
C DIFF GDH STL QL: NEGATIVE
C DIFF TOX A+B STL QL IA: NEGATIVE
C DIFF TOXIN INTERPRETATION: NORMAL
CA-I BLD-MCNC: 8.2 MG/DL (ref 8.5–10.1)
CHLORIDE SERPL-SCNC: 113 MMOL/L (ref 97–108)
CK SERPL-CCNC: 64 U/L (ref 26–192)
CO2 SERPL-SCNC: 27 MMOL/L (ref 21–32)
CREAT SERPL-MCNC: 0.71 MG/DL (ref 0.55–1.02)
DIFFERENTIAL METHOD BLD: ABNORMAL
EOSINOPHIL # BLD: 0.1 K/UL (ref 0–0.4)
EOSINOPHIL NFR BLD: 2 % (ref 0–7)
ERYTHROCYTE [DISTWIDTH] IN BLOOD BY AUTOMATED COUNT: 22.2 % (ref 11.5–14.5)
FACT VIII ACT/NOR PPP: 432 % (ref 80–200)
GLOBULIN SER CALC-MCNC: 2.6 G/DL (ref 2–4)
GLUCOSE SERPL-MCNC: 105 MG/DL (ref 65–100)
HCT VFR BLD AUTO: 20 % (ref 35–47)
HCT VFR BLD AUTO: 21.5 % (ref 35–47)
HCT VFR BLD AUTO: 23.7 % (ref 35–47)
HGB BLD-MCNC: 6.2 G/DL (ref 11.5–16)
HGB BLD-MCNC: 6.8 G/DL (ref 11.5–16)
HGB BLD-MCNC: 7.5 G/DL (ref 11.5–16)
IMM GRANULOCYTES # BLD AUTO: 0 K/UL
IMM GRANULOCYTES NFR BLD AUTO: 0 %
INR PPP: 1.2 (ref 0.9–1.1)
LYMPHOCYTES # BLD: 1 K/UL (ref 0.8–3.5)
LYMPHOCYTES NFR BLD: 15 % (ref 12–49)
MCH RBC QN AUTO: 24.6 PG (ref 26–34)
MCHC RBC AUTO-ENTMCNC: 31 G/DL (ref 30–36.5)
MCV RBC AUTO: 79.4 FL (ref 80–99)
METAMYELOCYTES NFR BLD MANUAL: 6 %
MONOCYTES # BLD: 0 K/UL (ref 0–1)
MONOCYTES NFR BLD: 0 % (ref 5–13)
MYELOCYTES NFR BLD MANUAL: 2 %
NEUTS BAND NFR BLD MANUAL: 2 % (ref 0–6)
NEUTS SEG # BLD: 4.7 K/UL (ref 1.8–8)
NEUTS SEG NFR BLD: 72 % (ref 32–75)
NRBC # BLD: 0 K/UL (ref 0–0.01)
NRBC BLD-RTO: 0 PER 100 WBC
PLATELET # BLD AUTO: 127 K/UL (ref 150–400)
PMV BLD AUTO: 10.1 FL (ref 8.9–12.9)
POTASSIUM SERPL-SCNC: 2.9 MMOL/L (ref 3.5–5.1)
POTASSIUM SERPL-SCNC: 3.5 MMOL/L (ref 3.5–5.1)
PROMYELOCYTES NFR BLD MANUAL: 1 %
PROT SERPL-MCNC: 4.9 G/DL (ref 6.4–8.2)
PROTHROMBIN TIME: 15.9 SEC (ref 11.9–14.6)
RBC # BLD AUTO: 2.52 M/UL (ref 3.8–5.2)
RBC MORPH BLD: ABNORMAL
SODIUM SERPL-SCNC: 145 MMOL/L (ref 136–145)
THERAPEUTIC RANGE: NORMAL SEC (ref 82–109)
TRANSFUSION STATUS PATIENT QL: NORMAL
UNIT DIVISION: 0
UNIT ISSUE DATE/TIME: NORMAL
WBC # BLD AUTO: 6.4 K/UL (ref 3.6–11)

## 2024-07-04 PROCEDURE — 82550 ASSAY OF CK (CPK): CPT

## 2024-07-04 PROCEDURE — 85610 PROTHROMBIN TIME: CPT

## 2024-07-04 PROCEDURE — 36430 TRANSFUSION BLD/BLD COMPNT: CPT

## 2024-07-04 PROCEDURE — 85730 THROMBOPLASTIN TIME PARTIAL: CPT

## 2024-07-04 PROCEDURE — 36415 COLL VENOUS BLD VENIPUNCTURE: CPT

## 2024-07-04 PROCEDURE — 85018 HEMOGLOBIN: CPT

## 2024-07-04 PROCEDURE — 2580000003 HC RX 258: Performed by: PHYSICIAN ASSISTANT

## 2024-07-04 PROCEDURE — 6360000002 HC RX W HCPCS: Performed by: INTERNAL MEDICINE

## 2024-07-04 PROCEDURE — 85240 CLOT FACTOR VIII AHG 1 STAGE: CPT

## 2024-07-04 PROCEDURE — 6360000002 HC RX W HCPCS: Performed by: PHYSICIAN ASSISTANT

## 2024-07-04 PROCEDURE — P9016 RBC LEUKOCYTES REDUCED: HCPCS

## 2024-07-04 PROCEDURE — 85025 COMPLETE CBC W/AUTO DIFF WBC: CPT

## 2024-07-04 PROCEDURE — 36410 VNPNXR 3YR/> PHY/QHP DX/THER: CPT

## 2024-07-04 PROCEDURE — 94761 N-INVAS EAR/PLS OXIMETRY MLT: CPT

## 2024-07-04 PROCEDURE — 80053 COMPREHEN METABOLIC PANEL: CPT

## 2024-07-04 PROCEDURE — 2000000000 HC ICU R&B

## 2024-07-04 PROCEDURE — 6360000002 HC RX W HCPCS: Performed by: HOSPITALIST

## 2024-07-04 PROCEDURE — 2580000003 HC RX 258: Performed by: INTERNAL MEDICINE

## 2024-07-04 PROCEDURE — 85014 HEMATOCRIT: CPT

## 2024-07-04 PROCEDURE — 84132 ASSAY OF SERUM POTASSIUM: CPT

## 2024-07-04 RX ORDER — SODIUM CHLORIDE 9 MG/ML
INJECTION, SOLUTION INTRAVENOUS PRN
Status: DISCONTINUED | OUTPATIENT
Start: 2024-07-04 | End: 2024-07-08 | Stop reason: HOSPADM

## 2024-07-04 RX ORDER — SODIUM CHLORIDE AND POTASSIUM CHLORIDE 150; 450 MG/100ML; MG/100ML
INJECTION, SOLUTION INTRAVENOUS CONTINUOUS
Status: DISCONTINUED | OUTPATIENT
Start: 2024-07-04 | End: 2024-07-08 | Stop reason: HOSPADM

## 2024-07-04 RX ORDER — POTASSIUM CHLORIDE 7.45 MG/ML
10 INJECTION INTRAVENOUS
Status: COMPLETED | OUTPATIENT
Start: 2024-07-04 | End: 2024-07-04

## 2024-07-04 RX ORDER — POTASSIUM CHLORIDE 20 MEQ/1
40 TABLET, EXTENDED RELEASE ORAL ONCE
Status: DISCONTINUED | OUTPATIENT
Start: 2024-07-04 | End: 2024-07-08 | Stop reason: HOSPADM

## 2024-07-04 RX ORDER — POTASSIUM CHLORIDE 7.45 MG/ML
10 INJECTION INTRAVENOUS
Status: DISPENSED | OUTPATIENT
Start: 2024-07-04 | End: 2024-07-04

## 2024-07-04 RX ADMIN — POTASSIUM CHLORIDE AND SODIUM CHLORIDE: 450; 150 INJECTION, SOLUTION INTRAVENOUS at 12:48

## 2024-07-04 RX ADMIN — SODIUM CHLORIDE, PRESERVATIVE FREE 10 ML: 5 INJECTION INTRAVENOUS at 21:27

## 2024-07-04 RX ADMIN — SODIUM CHLORIDE, PRESERVATIVE FREE 40 MG: 5 INJECTION INTRAVENOUS at 00:16

## 2024-07-04 RX ADMIN — IRON SUCROSE 100 MG: 20 INJECTION, SOLUTION INTRAVENOUS at 15:48

## 2024-07-04 RX ADMIN — SODIUM CHLORIDE, PRESERVATIVE FREE 10 ML: 5 INJECTION INTRAVENOUS at 08:48

## 2024-07-04 RX ADMIN — SODIUM CHLORIDE, PRESERVATIVE FREE 10 ML: 5 INJECTION INTRAVENOUS at 08:49

## 2024-07-04 RX ADMIN — SODIUM CHLORIDE, PRESERVATIVE FREE 40 MG: 5 INJECTION INTRAVENOUS at 12:48

## 2024-07-04 RX ADMIN — POTASSIUM CHLORIDE 10 MEQ: 7.46 INJECTION, SOLUTION INTRAVENOUS at 10:00

## 2024-07-04 RX ADMIN — SODIUM CHLORIDE, POTASSIUM CHLORIDE, SODIUM LACTATE AND CALCIUM CHLORIDE: 600; 310; 30; 20 INJECTION, SOLUTION INTRAVENOUS at 03:33

## 2024-07-04 RX ADMIN — PHYTONADIONE 10 MG: 10 INJECTION, EMULSION INTRAMUSCULAR; INTRAVENOUS; SUBCUTANEOUS at 22:00

## 2024-07-04 RX ADMIN — POTASSIUM CHLORIDE 10 MEQ: 7.46 INJECTION, SOLUTION INTRAVENOUS at 17:51

## 2024-07-04 RX ADMIN — POTASSIUM CHLORIDE 10 MEQ: 7.46 INJECTION, SOLUTION INTRAVENOUS at 15:54

## 2024-07-04 RX ADMIN — POTASSIUM CHLORIDE 10 MEQ: 7.46 INJECTION, SOLUTION INTRAVENOUS at 08:48

## 2024-07-04 RX ADMIN — POTASSIUM CHLORIDE 10 MEQ: 7.46 INJECTION, SOLUTION INTRAVENOUS at 15:45

## 2024-07-04 ASSESSMENT — PAIN SCALES - GENERAL
PAINLEVEL_OUTOF10: 0

## 2024-07-04 NOTE — PROCEDURES
PROCEDURE NOTE  Date: 7/4/2024   Name: Jimena Sepulveda  YOB: 1955    Procedures    Called to place midline for patient with limited access.  Per patient's nurse, Spring, the patient is blind and has been experiencing anxiety, so Spring requested to be present during insertion to comfort patient throughout procedure.  The left upper extremity was assessed first and a large brachial vein was identified (35%) with good compressibility.  Midline inserted sterilely using modified seldinger technique under ultrasound guidance.  Patient given lidocaine for local anesthetic, which she tolerated well.  During first attempt to access vessel, patient noted discomfort, so needle was withdrawn and additional lidocaine was given, which patient said provided adequate analgesia.  Vessel accessed with good venous blood return noted.  Guidewire advanced fully.  Introducer placed and guidewire removed.  Catheter trimmed to 9 cm and inserted fully with good blood return and easy flushing.  Catheter secured at 0 cm out with stabilization device and covered with CHG dressing.  Patient tolerated procedure well with no obvious adverse effects noted at this time.  Patient's nurse, HERRERA Londono, was made aware that midline is okay to use at this time.      Post insertion arm circumference:  28 cm.        REF:  [id number]  LOT:  [id number]  EXP:  09/30/2025

## 2024-07-05 LAB
ABO + RH BLD: NORMAL
ALBUMIN SERPL-MCNC: 2.3 G/DL (ref 3.5–5)
ALBUMIN/GLOB SERPL: 1 (ref 1.1–2.2)
ALP SERPL-CCNC: 51 U/L (ref 45–117)
ALT SERPL-CCNC: 16 U/L (ref 12–78)
ANION GAP SERPL CALC-SCNC: 7 MMOL/L (ref 5–15)
APTT PPP: 24.6 SEC (ref 21.2–34.1)
AST SERPL W P-5'-P-CCNC: 33 U/L (ref 15–37)
BASOPHILS # BLD: 0 K/UL (ref 0–0.1)
BASOPHILS NFR BLD: 0 % (ref 0–1)
BILIRUB SERPL-MCNC: 0.7 MG/DL (ref 0.2–1)
BLD PROD TYP BPU: NORMAL
BLOOD BANK BLOOD PRODUCT EXPIRATION DATE: NORMAL
BLOOD BANK DISPENSE STATUS: NORMAL
BLOOD BANK ISBT PRODUCT BLOOD TYPE: 5100
BLOOD BANK PRODUCT CODE: NORMAL
BLOOD BANK UNIT TYPE AND RH: NORMAL
BLOOD GROUP ANTIBODIES SERPL: NEGATIVE
BPU ID: NORMAL
BUN SERPL-MCNC: 9 MG/DL (ref 6–20)
BUN/CREAT SERPL: 17 (ref 12–20)
CA-I BLD-MCNC: 7.8 MG/DL (ref 8.5–10.1)
CHLORIDE SERPL-SCNC: 114 MMOL/L (ref 97–108)
CO2 SERPL-SCNC: 24 MMOL/L (ref 21–32)
CREAT SERPL-MCNC: 0.52 MG/DL (ref 0.55–1.02)
CROSSMATCH RESULT: NORMAL
DIFFERENTIAL METHOD BLD: ABNORMAL
EOSINOPHIL # BLD: 0.1 K/UL (ref 0–0.4)
EOSINOPHIL NFR BLD: 2 % (ref 0–7)
ERYTHROCYTE [DISTWIDTH] IN BLOOD BY AUTOMATED COUNT: 19 % (ref 11.5–14.5)
FACT VIII ACT/NOR PPP: 330 % (ref 56–140)
GLOBULIN SER CALC-MCNC: 2.4 G/DL (ref 2–4)
GLUCOSE SERPL-MCNC: 77 MG/DL (ref 65–100)
HCT VFR BLD AUTO: 26.6 % (ref 35–47)
HGB BLD-MCNC: 8.7 G/DL (ref 11.5–16)
IMM GRANULOCYTES # BLD AUTO: 0 K/UL (ref 0–0.04)
IMM GRANULOCYTES NFR BLD AUTO: 0 % (ref 0–0.5)
INR PPP: 1.1 (ref 0.9–1.1)
LYMPHOCYTES # BLD: 1.1 K/UL (ref 0.8–3.5)
LYMPHOCYTES NFR BLD: 19 % (ref 12–49)
MCH RBC QN AUTO: 26.4 PG (ref 26–34)
MCHC RBC AUTO-ENTMCNC: 32.7 G/DL (ref 30–36.5)
MCV RBC AUTO: 80.9 FL (ref 80–99)
MONOCYTES # BLD: 0.6 K/UL (ref 0–1)
MONOCYTES NFR BLD: 9 % (ref 5–13)
NEUTS SEG # BLD: 4.3 K/UL (ref 1.8–8)
NEUTS SEG NFR BLD: 70 % (ref 32–75)
NRBC # BLD: 0 K/UL (ref 0–0.01)
NRBC BLD-RTO: 0 PER 100 WBC
PERIPHERAL SMEAR, MD REVIEW: NORMAL
PLATELET # BLD AUTO: 117 K/UL (ref 150–400)
PMV BLD AUTO: 10.6 FL (ref 8.9–12.9)
POTASSIUM SERPL-SCNC: 3.6 MMOL/L (ref 3.5–5.1)
PROT SERPL-MCNC: 4.7 G/DL (ref 6.4–8.2)
PROTHROMBIN TIME: 14.2 SEC (ref 11.9–14.6)
RBC # BLD AUTO: 3.29 M/UL (ref 3.8–5.2)
SODIUM SERPL-SCNC: 145 MMOL/L (ref 136–145)
SPECIMEN EXP DATE BLD: NORMAL
THERAPEUTIC RANGE: NORMAL SEC (ref 82–109)
TRANSFUSION STATUS PATIENT QL: NORMAL
UNIT DIVISION: 0
UNIT ISSUE DATE/TIME: NORMAL
WBC # BLD AUTO: 6.1 K/UL (ref 3.6–11)

## 2024-07-05 PROCEDURE — 6360000002 HC RX W HCPCS: Performed by: INTERNAL MEDICINE

## 2024-07-05 PROCEDURE — 85610 PROTHROMBIN TIME: CPT

## 2024-07-05 PROCEDURE — 94761 N-INVAS EAR/PLS OXIMETRY MLT: CPT

## 2024-07-05 PROCEDURE — 2000000000 HC ICU R&B

## 2024-07-05 PROCEDURE — 36415 COLL VENOUS BLD VENIPUNCTURE: CPT

## 2024-07-05 PROCEDURE — 85025 COMPLETE CBC W/AUTO DIFF WBC: CPT

## 2024-07-05 PROCEDURE — 6360000002 HC RX W HCPCS: Performed by: PHYSICIAN ASSISTANT

## 2024-07-05 PROCEDURE — 2580000003 HC RX 258: Performed by: PHYSICIAN ASSISTANT

## 2024-07-05 PROCEDURE — 2700000000 HC OXYGEN THERAPY PER DAY

## 2024-07-05 PROCEDURE — 2580000003 HC RX 258: Performed by: INTERNAL MEDICINE

## 2024-07-05 PROCEDURE — 80053 COMPREHEN METABOLIC PANEL: CPT

## 2024-07-05 PROCEDURE — 85730 THROMBOPLASTIN TIME PARTIAL: CPT

## 2024-07-05 RX ADMIN — SODIUM CHLORIDE, PRESERVATIVE FREE 10 ML: 5 INJECTION INTRAVENOUS at 08:46

## 2024-07-05 RX ADMIN — SODIUM CHLORIDE, PRESERVATIVE FREE 10 ML: 5 INJECTION INTRAVENOUS at 20:11

## 2024-07-05 RX ADMIN — POTASSIUM CHLORIDE AND SODIUM CHLORIDE: 450; 150 INJECTION, SOLUTION INTRAVENOUS at 03:23

## 2024-07-05 RX ADMIN — POTASSIUM CHLORIDE AND SODIUM CHLORIDE: 450; 150 INJECTION, SOLUTION INTRAVENOUS at 16:34

## 2024-07-05 RX ADMIN — SODIUM CHLORIDE, PRESERVATIVE FREE 40 MG: 5 INJECTION INTRAVENOUS at 23:14

## 2024-07-05 RX ADMIN — IRON SUCROSE 100 MG: 20 INJECTION, SOLUTION INTRAVENOUS at 14:55

## 2024-07-05 RX ADMIN — SODIUM CHLORIDE, PRESERVATIVE FREE 40 MG: 5 INJECTION INTRAVENOUS at 12:28

## 2024-07-05 RX ADMIN — PHYTONADIONE 10 MG: 10 INJECTION, EMULSION INTRAMUSCULAR; INTRAVENOUS; SUBCUTANEOUS at 21:03

## 2024-07-05 RX ADMIN — SODIUM CHLORIDE, PRESERVATIVE FREE 10 ML: 5 INJECTION INTRAVENOUS at 08:48

## 2024-07-05 RX ADMIN — SODIUM CHLORIDE, PRESERVATIVE FREE 40 MG: 5 INJECTION INTRAVENOUS at 00:04

## 2024-07-05 ASSESSMENT — PAIN SCALES - GENERAL
PAINLEVEL_OUTOF10: 0

## 2024-07-06 LAB
ALBUMIN SERPL-MCNC: 2.4 G/DL (ref 3.5–5)
ALBUMIN/GLOB SERPL: 1 (ref 1.1–2.2)
ALP SERPL-CCNC: 59 U/L (ref 45–117)
ALT SERPL-CCNC: 17 U/L (ref 12–78)
ANION GAP SERPL CALC-SCNC: 6 MMOL/L (ref 5–15)
APTT PPP: 27.8 SEC (ref 21.2–34.1)
AST SERPL W P-5'-P-CCNC: 25 U/L (ref 15–37)
BACTERIA SPEC CULT: NORMAL
BASOPHILS # BLD: 0 K/UL (ref 0–0.1)
BASOPHILS NFR BLD: 0 % (ref 0–1)
BILIRUB SERPL-MCNC: 0.5 MG/DL (ref 0.2–1)
BUN SERPL-MCNC: 5 MG/DL (ref 6–20)
BUN/CREAT SERPL: 9 (ref 12–20)
CA-I BLD-MCNC: 8 MG/DL (ref 8.5–10.1)
CHLORIDE SERPL-SCNC: 108 MMOL/L (ref 97–108)
CO2 SERPL-SCNC: 25 MMOL/L (ref 21–32)
CREAT SERPL-MCNC: 0.56 MG/DL (ref 0.55–1.02)
DIFFERENTIAL METHOD BLD: ABNORMAL
EOSINOPHIL # BLD: 0.1 K/UL (ref 0–0.4)
EOSINOPHIL NFR BLD: 2 % (ref 0–7)
ERYTHROCYTE [DISTWIDTH] IN BLOOD BY AUTOMATED COUNT: 18.6 % (ref 11.5–14.5)
GLOBULIN SER CALC-MCNC: 2.5 G/DL (ref 2–4)
GLUCOSE SERPL-MCNC: 87 MG/DL (ref 65–100)
HCT VFR BLD AUTO: 31 % (ref 35–47)
HGB BLD-MCNC: 9.8 G/DL (ref 11.5–16)
IMM GRANULOCYTES # BLD AUTO: 0 K/UL (ref 0–0.04)
IMM GRANULOCYTES NFR BLD AUTO: 1 % (ref 0–0.5)
INR PPP: 1 (ref 0.9–1.1)
LYMPHOCYTES # BLD: 1.2 K/UL (ref 0.8–3.5)
LYMPHOCYTES NFR BLD: 22 % (ref 12–49)
Lab: NORMAL
MCH RBC QN AUTO: 26 PG (ref 26–34)
MCHC RBC AUTO-ENTMCNC: 31.6 G/DL (ref 30–36.5)
MCV RBC AUTO: 82.2 FL (ref 80–99)
MONOCYTES # BLD: 0.5 K/UL (ref 0–1)
MONOCYTES NFR BLD: 10 % (ref 5–13)
NEUTS SEG # BLD: 3.5 K/UL (ref 1.8–8)
NEUTS SEG NFR BLD: 65 % (ref 32–75)
NRBC # BLD: 0 K/UL (ref 0–0.01)
NRBC BLD-RTO: 0 PER 100 WBC
PLATELET # BLD AUTO: 127 K/UL (ref 150–400)
PMV BLD AUTO: 10.1 FL (ref 8.9–12.9)
POTASSIUM SERPL-SCNC: 3.5 MMOL/L (ref 3.5–5.1)
PROT SERPL-MCNC: 4.9 G/DL (ref 6.4–8.2)
PROTHROMBIN TIME: 13.8 SEC (ref 11.9–14.6)
RBC # BLD AUTO: 3.77 M/UL (ref 3.8–5.2)
SODIUM SERPL-SCNC: 139 MMOL/L (ref 136–145)
THERAPEUTIC RANGE: NORMAL SEC (ref 82–109)
WBC # BLD AUTO: 5.3 K/UL (ref 3.6–11)

## 2024-07-06 PROCEDURE — 80053 COMPREHEN METABOLIC PANEL: CPT

## 2024-07-06 PROCEDURE — 6360000002 HC RX W HCPCS: Performed by: PHYSICIAN ASSISTANT

## 2024-07-06 PROCEDURE — 2580000003 HC RX 258: Performed by: PHYSICIAN ASSISTANT

## 2024-07-06 PROCEDURE — 94761 N-INVAS EAR/PLS OXIMETRY MLT: CPT

## 2024-07-06 PROCEDURE — 85025 COMPLETE CBC W/AUTO DIFF WBC: CPT

## 2024-07-06 PROCEDURE — 1100000000 HC RM PRIVATE

## 2024-07-06 PROCEDURE — 6370000000 HC RX 637 (ALT 250 FOR IP): Performed by: PHYSICIAN ASSISTANT

## 2024-07-06 PROCEDURE — 85610 PROTHROMBIN TIME: CPT

## 2024-07-06 PROCEDURE — 85730 THROMBOPLASTIN TIME PARTIAL: CPT

## 2024-07-06 PROCEDURE — 36415 COLL VENOUS BLD VENIPUNCTURE: CPT

## 2024-07-06 PROCEDURE — 85611 PROTHROMBIN TEST: CPT

## 2024-07-06 RX ADMIN — SODIUM CHLORIDE, PRESERVATIVE FREE 10 ML: 5 INJECTION INTRAVENOUS at 19:57

## 2024-07-06 RX ADMIN — SODIUM CHLORIDE, PRESERVATIVE FREE 10 ML: 5 INJECTION INTRAVENOUS at 09:36

## 2024-07-06 RX ADMIN — HYDRALAZINE HYDROCHLORIDE 10 MG: 20 INJECTION INTRAMUSCULAR; INTRAVENOUS at 03:10

## 2024-07-06 RX ADMIN — POTASSIUM BICARBONATE 40 MEQ: 782 TABLET, EFFERVESCENT ORAL at 05:06

## 2024-07-06 RX ADMIN — POTASSIUM CHLORIDE AND SODIUM CHLORIDE: 450; 150 INJECTION, SOLUTION INTRAVENOUS at 05:06

## 2024-07-06 RX ADMIN — POTASSIUM CHLORIDE AND SODIUM CHLORIDE: 450; 150 INJECTION, SOLUTION INTRAVENOUS at 15:05

## 2024-07-06 RX ADMIN — SODIUM CHLORIDE, PRESERVATIVE FREE 40 MG: 5 INJECTION INTRAVENOUS at 11:46

## 2024-07-06 RX ADMIN — SODIUM CHLORIDE, PRESERVATIVE FREE 10 ML: 5 INJECTION INTRAVENOUS at 19:56

## 2024-07-06 ASSESSMENT — PAIN SCALES - GENERAL
PAINLEVEL_OUTOF10: 0

## 2024-07-07 LAB
ALBUMIN SERPL-MCNC: 2.6 G/DL (ref 3.5–5)
ALBUMIN/GLOB SERPL: 0.9 (ref 1.1–2.2)
ALP SERPL-CCNC: 70 U/L (ref 45–117)
ALT SERPL-CCNC: 15 U/L (ref 12–78)
ANION GAP SERPL CALC-SCNC: 6 MMOL/L (ref 5–15)
AST SERPL W P-5'-P-CCNC: 17 U/L (ref 15–37)
BASOPHILS # BLD: 0 K/UL (ref 0–0.1)
BASOPHILS NFR BLD: 1 % (ref 0–1)
BILIRUB SERPL-MCNC: 0.6 MG/DL (ref 0.2–1)
BUN SERPL-MCNC: 3 MG/DL (ref 6–20)
BUN/CREAT SERPL: 5 (ref 12–20)
CA-I BLD-MCNC: 8.5 MG/DL (ref 8.5–10.1)
CHLORIDE SERPL-SCNC: 109 MMOL/L (ref 97–108)
CO2 SERPL-SCNC: 25 MMOL/L (ref 21–32)
CREAT SERPL-MCNC: 0.64 MG/DL (ref 0.55–1.02)
DIFFERENTIAL METHOD BLD: ABNORMAL
EOSINOPHIL # BLD: 0.1 K/UL (ref 0–0.4)
EOSINOPHIL NFR BLD: 2 % (ref 0–7)
ERYTHROCYTE [DISTWIDTH] IN BLOOD BY AUTOMATED COUNT: 19.1 % (ref 11.5–14.5)
GLOBULIN SER CALC-MCNC: 2.8 G/DL (ref 2–4)
GLUCOSE SERPL-MCNC: 102 MG/DL (ref 65–100)
HCT VFR BLD AUTO: 33.4 % (ref 35–47)
HGB BLD-MCNC: 10.8 G/DL (ref 11.5–16)
IMM GRANULOCYTES # BLD AUTO: 0.1 K/UL (ref 0–0.04)
IMM GRANULOCYTES NFR BLD AUTO: 1 % (ref 0–0.5)
LYMPHOCYTES # BLD: 1.3 K/UL (ref 0.8–3.5)
LYMPHOCYTES NFR BLD: 24 % (ref 12–49)
Lab: NORMAL
MCH RBC QN AUTO: 26.3 PG (ref 26–34)
MCHC RBC AUTO-ENTMCNC: 32.3 G/DL (ref 30–36.5)
MCV RBC AUTO: 81.5 FL (ref 80–99)
MONOCYTES # BLD: 0.6 K/UL (ref 0–1)
MONOCYTES NFR BLD: 11 % (ref 5–13)
NEUTS SEG # BLD: 3.2 K/UL (ref 1.8–8)
NEUTS SEG NFR BLD: 61 % (ref 32–75)
NRBC # BLD: 0 K/UL (ref 0–0.01)
NRBC BLD-RTO: 0 PER 100 WBC
PLATELET # BLD AUTO: 149 K/UL (ref 150–400)
PMV BLD AUTO: 10.3 FL (ref 8.9–12.9)
POTASSIUM SERPL-SCNC: 3.4 MMOL/L (ref 3.5–5.1)
PROT SERPL-MCNC: 5.4 G/DL (ref 6.4–8.2)
RBC # BLD AUTO: 4.1 M/UL (ref 3.8–5.2)
REFERENCE LAB: NORMAL
SODIUM SERPL-SCNC: 140 MMOL/L (ref 136–145)
WBC # BLD AUTO: 5.2 K/UL (ref 3.6–11)

## 2024-07-07 PROCEDURE — 6360000002 HC RX W HCPCS: Performed by: INTERNAL MEDICINE

## 2024-07-07 PROCEDURE — 85025 COMPLETE CBC W/AUTO DIFF WBC: CPT

## 2024-07-07 PROCEDURE — 6370000000 HC RX 637 (ALT 250 FOR IP): Performed by: PHYSICIAN ASSISTANT

## 2024-07-07 PROCEDURE — 36415 COLL VENOUS BLD VENIPUNCTURE: CPT

## 2024-07-07 PROCEDURE — 80053 COMPREHEN METABOLIC PANEL: CPT

## 2024-07-07 PROCEDURE — 94761 N-INVAS EAR/PLS OXIMETRY MLT: CPT

## 2024-07-07 PROCEDURE — 1100000000 HC RM PRIVATE

## 2024-07-07 PROCEDURE — 2580000003 HC RX 258: Performed by: PHYSICIAN ASSISTANT

## 2024-07-07 PROCEDURE — 6360000002 HC RX W HCPCS: Performed by: PHYSICIAN ASSISTANT

## 2024-07-07 RX ORDER — SUCRALFATE 1 G/1
1 TABLET ORAL 4 TIMES DAILY
Qty: 120 TABLET | Refills: 3 | Status: SHIPPED | OUTPATIENT
Start: 2024-07-07

## 2024-07-07 RX ORDER — PANTOPRAZOLE SODIUM 40 MG/1
40 TABLET, DELAYED RELEASE ORAL 2 TIMES DAILY
Qty: 90 TABLET | Refills: 1 | Status: SHIPPED | OUTPATIENT
Start: 2024-07-07

## 2024-07-07 RX ORDER — FERROUS SULFATE 325(65) MG
325 TABLET ORAL 2 TIMES DAILY
Qty: 180 TABLET | Refills: 1 | Status: SHIPPED | OUTPATIENT
Start: 2024-07-07

## 2024-07-07 RX ADMIN — SODIUM CHLORIDE, PRESERVATIVE FREE 40 MG: 5 INJECTION INTRAVENOUS at 13:05

## 2024-07-07 RX ADMIN — POTASSIUM CHLORIDE AND SODIUM CHLORIDE: 450; 150 INJECTION, SOLUTION INTRAVENOUS at 04:21

## 2024-07-07 RX ADMIN — SODIUM CHLORIDE, PRESERVATIVE FREE 10 ML: 5 INJECTION INTRAVENOUS at 09:30

## 2024-07-07 RX ADMIN — SODIUM CHLORIDE, PRESERVATIVE FREE 40 MG: 5 INJECTION INTRAVENOUS at 00:45

## 2024-07-07 RX ADMIN — IRON SUCROSE 100 MG: 20 INJECTION, SOLUTION INTRAVENOUS at 14:47

## 2024-07-07 RX ADMIN — ACETAMINOPHEN 650 MG: 325 TABLET ORAL at 22:23

## 2024-07-07 ASSESSMENT — PAIN SCALES - GENERAL: PAINLEVEL_OUTOF10: 8

## 2024-07-07 NOTE — DISCHARGE SUMMARY
Discharge Summary    Name: Jimena Sepulveda  [id number]  YOB: 1955 (Age: 69 y.o.)   Date of Admission: 7/2/2024  Date of Discharge: 7/7/2024  Attending Physician: Tavo Philip MD    Discharge Diagnosis:   Principal Problem:    GI bleed  Resolved Problems:    * No resolved hospital problems. *  Upper GI bleed  Esophagitis  Gastritis  Hematemesis  Acute blood loss anemia  Iron deficiency anemia  Clotting factor deficiency  Metabolic acidosis  Hypothyroidism  Advanced dementia  Hyperlipidemia  Psychiatric disorder  Hypokalemia    Consultations:  IP CONSULT TO GI  IP CONSULT TO HEMATOLOGY  IP CONSULT TO VASCULAR ACCESS TEAM      Brief Admission History/Reason for Admission Per Tavo Philip MD:       Brief Hospital Course by Main Problems:   69-year-old female with a history of CVA, dementia, chronic respiratory failure with hypoxia, urinary tract infection and essential hypertension with blindness who presented from a long-term care facility, [facility] re[facility] who presented with acute blood loss anemia secondary to hematemesis.  Patient was transfused 2 units of packed red blood cells and underwent EGD by Dr. Newman.  EGD revealed mucosal breaks of the esophagus consistent with esophagitis as well as diffuse moderate inflammation characterized by erythema of the gastric antrum with patchy moderate inflammation of the duodenum consistent with duodenitis.  Erosions were noted but no obvious bleeding.  Blood product was removed consistent with coffee-ground emesis during the procedure.  Continue on Protonix and holding all anticoagulation and NSAIDs.  Patient also has a history of a coagulation deficit of unclear etiology.  DIC panel normal and hematology consultation as patient continues to have bleeding episodes.  CTA of the abdomen pelvis negative for acute bleed.  Patient with advanced dementia and all decisions are via the spouse, Vignesh

## 2024-07-07 NOTE — PLAN OF CARE
Problem: Chronic Conditions and Co-morbidities  Goal: Patient's chronic conditions and co-morbidity symptoms are monitored and maintained or improved  Outcome: Progressing  Flowsheets (Taken 7/4/2024 0711)  Care Plan - Patient's Chronic Conditions and Co-Morbidity Symptoms are Monitored and Maintained or Improved:   Monitor and assess patient's chronic conditions and comorbid symptoms for stability, deterioration, or improvement   Collaborate with multidisciplinary team to address chronic and comorbid conditions and prevent exacerbation or deterioration   Update acute care plan with appropriate goals if chronic or comorbid symptoms are exacerbated and prevent overall improvement and discharge     Problem: Pain  Goal: Verbalizes/displays adequate comfort level or baseline comfort level  Outcome: Progressing  Flowsheets (Taken 7/4/2024 0700)  Verbalizes/displays adequate comfort level or baseline comfort level:   Encourage patient to monitor pain and request assistance   Assess pain using appropriate pain scale   Administer analgesics based on type and severity of pain and evaluate response     Problem: Safety - Adult  Goal: Free from fall injury  Outcome: Progressing     Problem: Skin/Tissue Integrity  Goal: Absence of new skin breakdown  Description: 1.  Monitor for areas of redness and/or skin breakdown  2.  Assess vascular access sites hourly  3.  Every 4-6 hours minimum:  Change oxygen saturation probe site  4.  Every 4-6 hours:  If on nasal continuous positive airway pressure, respiratory therapy assess nares and determine need for appliance change or resting period.  Outcome: Progressing     Problem: ABCDS Injury Assessment  Goal: Absence of physical injury  Outcome: Progressing     Problem: Coping  Goal: Pt/Family able to verbalize concerns and demonstrate effective coping strategies  Description: INTERVENTIONS:  1. Assist patient/family to identify coping skills, available support systems and cultural and 
  Problem: Discharge Planning  Goal: Discharge to home or other facility with appropriate resources  7/7/2024 1315 by Lisa Connelly LPN  Outcome: Progressing  7/7/2024 0450 by Ana Maria Walter RN  Outcome: Progressing  Flowsheets (Taken 7/6/2024 1528 by Pepito Regalado, RN)  Discharge to home or other facility with appropriate resources: Identify barriers to discharge with patient and caregiver     Problem: Chronic Conditions and Co-morbidities  Goal: Patient's chronic conditions and co-morbidity symptoms are monitored and maintained or improved  7/7/2024 1315 by Lisa Connelly LPN  Outcome: Progressing  7/7/2024 0450 by Ana Maria Walter RN  Outcome: Progressing  Flowsheets (Taken 7/6/2024 1528 by Pepito Regalado RN)  Care Plan - Patient's Chronic Conditions and Co-Morbidity Symptoms are Monitored and Maintained or Improved:   Collaborate with multidisciplinary team to address chronic and comorbid conditions and prevent exacerbation or deterioration   Monitor and assess patient's chronic conditions and comorbid symptoms for stability, deterioration, or improvement     Problem: Pain  Goal: Verbalizes/displays adequate comfort level or baseline comfort level  7/7/2024 1315 by Lisa Connelly LPN  Outcome: Progressing  7/7/2024 0450 by Ana Maria Walter RN  Outcome: Progressing     Problem: Safety - Adult  Goal: Free from fall injury  7/7/2024 1315 by Lisa Connelly LPN  Outcome: Progressing  7/7/2024 0450 by Ana Maria Walter RN  Outcome: Progressing     Problem: Skin/Tissue Integrity  Goal: Absence of new skin breakdown  Description: 1.  Monitor for areas of redness and/or skin breakdown  2.  Assess vascular access sites hourly  3.  Every 4-6 hours minimum:  Change oxygen saturation probe site  4.  Every 4-6 hours:  If on nasal continuous positive airway pressure, respiratory therapy assess nares and determine need for appliance change or resting period.  7/7/2024 1315 by Lisa Connelly 
  Problem: Discharge Planning  Goal: Discharge to home or other facility with appropriate resources  7/7/2024 1626 by Lisa Connelly LPN  Outcome: Adequate for Discharge  7/7/2024 1626 by Lisa Connelly LPN  Outcome: Progressing  7/7/2024 1625 by Lisa Connelly LPN  Outcome: Progressing  7/7/2024 1315 by Lisa Connelly LPN  Outcome: Progressing  7/7/2024 0450 by Ana Maria Walter RN  Outcome: Progressing  Flowsheets (Taken 7/6/2024 1528 by Pepito Regalado, RN)  Discharge to home or other facility with appropriate resources: Identify barriers to discharge with patient and caregiver     Problem: Chronic Conditions and Co-morbidities  Goal: Patient's chronic conditions and co-morbidity symptoms are monitored and maintained or improved  7/7/2024 1626 by Lisa Connelly LPN  Outcome: Adequate for Discharge  7/7/2024 1626 by Lisa Connelly LPN  Outcome: Progressing  7/7/2024 1625 by Lisa Connelly LPN  Outcome: Progressing  7/7/2024 1315 by Lisa Connelly LPN  Outcome: Progressing  7/7/2024 0450 by Ana Maria Walter RN  Outcome: Progressing  Flowsheets (Taken 7/6/2024 1528 by Pepito Regalado, RN)  Care Plan - Patient's Chronic Conditions and Co-Morbidity Symptoms are Monitored and Maintained or Improved:   Collaborate with multidisciplinary team to address chronic and comorbid conditions and prevent exacerbation or deterioration   Monitor and assess patient's chronic conditions and comorbid symptoms for stability, deterioration, or improvement     Problem: Pain  Goal: Verbalizes/displays adequate comfort level or baseline comfort level  7/7/2024 1626 by Lisa Connelly LPN  Outcome: Adequate for Discharge  7/7/2024 1626 by Lisa Connelly LPN  Outcome: Progressing  7/7/2024 1625 by Lisa Connelly LPN  Outcome: Progressing  7/7/2024 1315 by Lisa Connelly LPN  Outcome: Progressing  7/7/2024 0450 by Ana Maria Walter RN  Outcome: Progressing     Problem: Safety - Adult  Goal: Free 
  Problem: Discharge Planning  Goal: Discharge to home or other facility with appropriate resources  Outcome: Progressing  Flowsheets (Taken 7/6/2024 1528 by Pepito Regalado, RN)  Discharge to home or other facility with appropriate resources: Identify barriers to discharge with patient and caregiver     Problem: Chronic Conditions and Co-morbidities  Goal: Patient's chronic conditions and co-morbidity symptoms are monitored and maintained or improved  Outcome: Progressing  Flowsheets (Taken 7/6/2024 1528 by Pepito Regalado, RN)  Care Plan - Patient's Chronic Conditions and Co-Morbidity Symptoms are Monitored and Maintained or Improved:   Collaborate with multidisciplinary team to address chronic and comorbid conditions and prevent exacerbation or deterioration   Monitor and assess patient's chronic conditions and comorbid symptoms for stability, deterioration, or improvement     Problem: Pain  Goal: Verbalizes/displays adequate comfort level or baseline comfort level  Outcome: Progressing     Problem: Safety - Adult  Goal: Free from fall injury  Outcome: Progressing     Problem: Skin/Tissue Integrity  Goal: Absence of new skin breakdown  Description: 1.  Monitor for areas of redness and/or skin breakdown  2.  Assess vascular access sites hourly  3.  Every 4-6 hours minimum:  Change oxygen saturation probe site  4.  Every 4-6 hours:  If on nasal continuous positive airway pressure, respiratory therapy assess nares and determine need for appliance change or resting period.  Outcome: Progressing     Problem: ABCDS Injury Assessment  Goal: Absence of physical injury  Outcome: Progressing     Problem: Coping  Goal: Pt/Family able to verbalize concerns and demonstrate effective coping strategies  Description: INTERVENTIONS:  1. Assist patient/family to identify coping skills, available support systems and cultural and spiritual values  2. Provide emotional support, including active listening and acknowledgement 
  Problem: Discharge Planning  Goal: Discharge to home or other facility with appropriate resources  Recent Flowsheet Documentation  Taken 7/3/2024 2000 by Marcelo Doe RN  Discharge to home or other facility with appropriate resources:   Identify barriers to discharge with patient and caregiver   Arrange for needed discharge resources and transportation as appropriate   Identify discharge learning needs (meds, wound care, etc)     Problem: Chronic Conditions and Co-morbidities  Goal: Patient's chronic conditions and co-morbidity symptoms are monitored and maintained or improved  Recent Flowsheet Documentation  Taken 7/3/2024 2000 by Marcelo Doe RN  Care Plan - Patient's Chronic Conditions and Co-Morbidity Symptoms are Monitored and Maintained or Improved:   Monitor and assess patient's chronic conditions and comorbid symptoms for stability, deterioration, or improvement   Collaborate with multidisciplinary team to address chronic and comorbid conditions and prevent exacerbation or deterioration   Update acute care plan with appropriate goals if chronic or comorbid symptoms are exacerbated and prevent overall improvement and discharge     Problem: Pain  Goal: Verbalizes/displays adequate comfort level or baseline comfort level  Recent Flowsheet Documentation  Taken 7/3/2024 2000 by Marcelo Deo RN  Verbalizes/displays adequate comfort level or baseline comfort level:   Encourage patient to monitor pain and request assistance   Assess pain using appropriate pain scale   Administer analgesics based on type and severity of pain and evaluate response     Problem: Safety - Adult  Goal: Free from fall injury  Recent Flowsheet Documentation  Taken 7/3/2024 2000 by Marcelo Doe RN  Free From Fall Injury:   Instruct family/caregiver on patient safety   Based on caregiver fall risk screen, instruct family/caregiver to ask for assistance with transferring infant if caregiver noted to have fall risk factors   
  Problem: Metabolic/Fluid and Electrolytes - Adult  Goal: Electrolytes maintained within normal limits  Recent Flowsheet Documentation  Taken 7/4/2024 2000 by Marcelo Doe RN  Electrolytes maintained within normal limits:   Monitor labs and assess patient for signs and symptoms of electrolyte imbalances   Administer electrolyte replacement as ordered   Monitor response to electrolyte replacements, including repeat lab results as appropriate  7/4/2024 1713 by Spring Rosa RN  Outcome: Not Progressing  Flowsheets (Taken 7/4/2024 0711)  Electrolytes maintained within normal limits:   Monitor labs and assess patient for signs and symptoms of electrolyte imbalances   Administer electrolyte replacement as ordered   Fluid restriction as ordered   Monitor response to electrolyte replacements, including repeat lab results as appropriate   Instruct patient on fluid and nutrition restrictions as appropriate     Problem: Chronic Conditions and Co-morbidities  Goal: Patient's chronic conditions and co-morbidity symptoms are monitored and maintained or improved  Recent Flowsheet Documentation  Taken 7/4/2024 2000 by Marcelo Doe RN  Care Plan - Patient's Chronic Conditions and Co-Morbidity Symptoms are Monitored and Maintained or Improved:   Monitor and assess patient's chronic conditions and comorbid symptoms for stability, deterioration, or improvement   Collaborate with multidisciplinary team to address chronic and comorbid conditions and prevent exacerbation or deterioration   Update acute care plan with appropriate goals if chronic or comorbid symptoms are exacerbated and prevent overall improvement and discharge  7/4/2024 1713 by Spring Rosa RN  Outcome: Progressing  Flowsheets (Taken 7/4/2024 0711)  Care Plan - Patient's Chronic Conditions and Co-Morbidity Symptoms are Monitored and Maintained or Improved:   Monitor and assess patient's chronic conditions and comorbid symptoms for stability, deterioration, or 
  Problem: Safety - Adult  Goal: Free from fall injury  Outcome: Progressing     
  Problem: Safety - Adult  Goal: Free from fall injury  Outcome: Progressing     
level:   Assess pain using appropriate pain scale   Administer analgesics based on type and severity of pain and evaluate response   Implement non-pharmacological measures as appropriate and evaluate response   Consider cultural and social influences on pain and pain management  7/5/2024 1134 by Spring Rosa RN  Outcome: Progressing  Flowsheets (Taken 7/5/2024 0700)  Verbalizes/displays adequate comfort level or baseline comfort level:   Encourage patient to monitor pain and request assistance   Assess pain using appropriate pain scale   Administer analgesics based on type and severity of pain and evaluate response     Problem: Safety - Adult  Goal: Free from fall injury  7/5/2024 2312 by Kaley Oconnell RN  Outcome: Progressing  Flowsheets (Taken 7/5/2024 2000)  Free From Fall Injury:   Instruct family/caregiver on patient safety   Based on caregiver fall risk screen, instruct family/caregiver to ask for assistance with transferring infant if caregiver noted to have fall risk factors  7/5/2024 1134 by Spring Rosa RN  Outcome: Progressing     Problem: Skin/Tissue Integrity  Goal: Absence of new skin breakdown  Description: 1.  Monitor for areas of redness and/or skin breakdown  2.  Assess vascular access sites hourly  3.  Every 4-6 hours minimum:  Change oxygen saturation probe site  4.  Every 4-6 hours:  If on nasal continuous positive airway pressure, respiratory therapy assess nares and determine need for appliance change or resting period.  7/5/2024 2312 by Kaley Oconnell RN  Outcome: Progressing  7/5/2024 1134 by Spring Rosa RN  Outcome: Progressing     Problem: ABCDS Injury Assessment  Goal: Absence of physical injury  7/5/2024 2312 by Kaley Oconnell RN  Outcome: Progressing  Flowsheets (Taken 7/5/2024 2000)  Absence of Physical Injury: Implement safety measures based on patient assessment  7/5/2024 1134 by Spring Rosa RN  Outcome: Progressing     Problem: Coping  Goal: Pt/Family able to verbalize 
sites and surrounding tissue   Implement wound care per orders   Initiate pressure ulcer prevention bundle as indicated     Problem: Musculoskeletal - Adult  Goal: Return ADL status to a safe level of function  Recent Flowsheet Documentation  Taken 7/5/2024 0712 by Spring Rosa RN  Return ADL Status to a Safe Level of Function:   Administer medication as ordered   Assess activities of daily living deficits and provide assistive devices as needed   Obtain physical therapy/occupational therapy consults as needed   Assist and instruct patient to increase activity and self care as tolerated     Problem: Gastrointestinal - Adult  Goal: Maintains adequate nutritional intake  Recent Flowsheet Documentation  Taken 7/5/2024 0712 by Spring Rosa RN  Maintains adequate nutritional intake: Monitor intake and output, weight and lab values     Problem: Genitourinary - Adult  Goal: Absence of urinary retention  Recent Flowsheet Documentation  Taken 7/5/2024 0712 by Spring Rosa RN  Absence of urinary retention:   Assess patient’s ability to void and empty bladder   Monitor intake/output and perform bladder scan as needed     Problem: Infection - Adult  Goal: Absence of infection at discharge  Recent Flowsheet Documentation  Taken 7/5/2024 0712 by Spring Rosa RN  Absence of infection at discharge:   Assess and monitor for signs and symptoms of infection   Monitor lab/diagnostic results   Monitor all insertion sites i.e., indwelling lines, tubes and drains   [place] appropriate cooling/warming therapies per order   Administer medications as ordered   Instruct and encourage patient and family to use good hand hygiene technique   Identify and instruct in appropriate isolation precautions for identified infection/condition

## 2024-07-08 VITALS
SYSTOLIC BLOOD PRESSURE: 127 MMHG | OXYGEN SATURATION: 95 % | TEMPERATURE: 98.6 F | RESPIRATION RATE: 17 BRPM | WEIGHT: 152.12 LBS | HEART RATE: 70 BPM | HEIGHT: 64 IN | BODY MASS INDEX: 25.97 KG/M2 | DIASTOLIC BLOOD PRESSURE: 59 MMHG

## 2024-07-08 LAB
ALBUMIN SERPL-MCNC: 2.5 G/DL (ref 3.5–5)
ALBUMIN/GLOB SERPL: 0.9 (ref 1.1–2.2)
ALP SERPL-CCNC: 68 U/L (ref 45–117)
ALT SERPL-CCNC: 15 U/L (ref 12–78)
ANION GAP SERPL CALC-SCNC: 5 MMOL/L (ref 5–15)
APTT PPP: 27.9 SEC (ref 21.2–34.1)
AST SERPL W P-5'-P-CCNC: 15 U/L (ref 15–37)
BASOPHILS # BLD: 0 K/UL (ref 0–0.1)
BASOPHILS NFR BLD: 1 % (ref 0–1)
BILIRUB SERPL-MCNC: 0.6 MG/DL (ref 0.2–1)
BUN SERPL-MCNC: 2 MG/DL (ref 6–20)
BUN/CREAT SERPL: 3 (ref 12–20)
CA-I BLD-MCNC: 8.5 MG/DL (ref 8.5–10.1)
CHLORIDE SERPL-SCNC: 111 MMOL/L (ref 97–108)
CO2 SERPL-SCNC: 25 MMOL/L (ref 21–32)
CREAT SERPL-MCNC: 0.67 MG/DL (ref 0.55–1.02)
DIFFERENTIAL METHOD BLD: ABNORMAL
EOSINOPHIL # BLD: 0.1 K/UL (ref 0–0.4)
EOSINOPHIL NFR BLD: 3 % (ref 0–7)
ERYTHROCYTE [DISTWIDTH] IN BLOOD BY AUTOMATED COUNT: 19.1 % (ref 11.5–14.5)
GLOBULIN SER CALC-MCNC: 2.7 G/DL (ref 2–4)
GLUCOSE SERPL-MCNC: 88 MG/DL (ref 65–100)
HCT VFR BLD AUTO: 32 % (ref 35–47)
HGB BLD-MCNC: 10.2 G/DL (ref 11.5–16)
IMM GRANULOCYTES # BLD AUTO: 0 K/UL (ref 0–0.04)
IMM GRANULOCYTES NFR BLD AUTO: 1 % (ref 0–0.5)
INR PPP: 0.9 (ref 0.9–1.1)
LYMPHOCYTES # BLD: 1.2 K/UL (ref 0.8–3.5)
LYMPHOCYTES NFR BLD: 31 % (ref 12–49)
MCH RBC QN AUTO: 26 PG (ref 26–34)
MCHC RBC AUTO-ENTMCNC: 31.9 G/DL (ref 30–36.5)
MCV RBC AUTO: 81.4 FL (ref 80–99)
MONOCYTES # BLD: 0.6 K/UL (ref 0–1)
MONOCYTES NFR BLD: 14 % (ref 5–13)
NEUTS SEG # BLD: 2 K/UL (ref 1.8–8)
NEUTS SEG NFR BLD: 50 % (ref 32–75)
NRBC # BLD: 0 K/UL (ref 0–0.01)
NRBC BLD-RTO: 0 PER 100 WBC
PLATELET # BLD AUTO: 147 K/UL (ref 150–400)
PMV BLD AUTO: 10.3 FL (ref 8.9–12.9)
POTASSIUM SERPL-SCNC: 3.4 MMOL/L (ref 3.5–5.1)
PROT SERPL-MCNC: 5.2 G/DL (ref 6.4–8.2)
PROTHROMBIN TIME: 13 SEC (ref 11.9–14.6)
RBC # BLD AUTO: 3.93 M/UL (ref 3.8–5.2)
SODIUM SERPL-SCNC: 141 MMOL/L (ref 136–145)
THERAPEUTIC RANGE: NORMAL SEC (ref 82–109)
WBC # BLD AUTO: 4 K/UL (ref 3.6–11)

## 2024-07-08 PROCEDURE — 36415 COLL VENOUS BLD VENIPUNCTURE: CPT

## 2024-07-08 PROCEDURE — 6360000002 HC RX W HCPCS: Performed by: INTERNAL MEDICINE

## 2024-07-08 PROCEDURE — 85730 THROMBOPLASTIN TIME PARTIAL: CPT

## 2024-07-08 PROCEDURE — 6370000000 HC RX 637 (ALT 250 FOR IP): Performed by: HOSPITALIST

## 2024-07-08 PROCEDURE — 85025 COMPLETE CBC W/AUTO DIFF WBC: CPT

## 2024-07-08 PROCEDURE — 80053 COMPREHEN METABOLIC PANEL: CPT

## 2024-07-08 PROCEDURE — 85610 PROTHROMBIN TIME: CPT

## 2024-07-08 RX ORDER — ASPIRIN 81 MG/1
81 TABLET ORAL DAILY
Status: DISCONTINUED | OUTPATIENT
Start: 2024-07-08 | End: 2024-07-08 | Stop reason: HOSPADM

## 2024-07-08 RX ORDER — TRAZODONE HYDROCHLORIDE 50 MG/1
200 TABLET ORAL NIGHTLY
Status: DISCONTINUED | OUTPATIENT
Start: 2024-07-08 | End: 2024-07-08 | Stop reason: HOSPADM

## 2024-07-08 RX ORDER — PANTOPRAZOLE SODIUM 40 MG/1
40 TABLET, DELAYED RELEASE ORAL ONCE
Status: COMPLETED | OUTPATIENT
Start: 2024-07-08 | End: 2024-07-08

## 2024-07-08 RX ORDER — PANTOPRAZOLE SODIUM 40 MG/1
40 TABLET, DELAYED RELEASE ORAL
Status: DISCONTINUED | OUTPATIENT
Start: 2024-07-08 | End: 2024-07-08 | Stop reason: HOSPADM

## 2024-07-08 RX ORDER — DIVALPROEX SODIUM 125 MG/1
125 CAPSULE, COATED PELLETS ORAL 2 TIMES DAILY
Status: DISCONTINUED | OUTPATIENT
Start: 2024-07-08 | End: 2024-07-08 | Stop reason: HOSPADM

## 2024-07-08 RX ORDER — DONEPEZIL HYDROCHLORIDE 5 MG/1
10 TABLET, FILM COATED ORAL
Status: DISCONTINUED | OUTPATIENT
Start: 2024-07-08 | End: 2024-07-08 | Stop reason: HOSPADM

## 2024-07-08 RX ORDER — QUETIAPINE FUMARATE 100 MG/1
100 TABLET, FILM COATED ORAL NIGHTLY
Status: DISCONTINUED | OUTPATIENT
Start: 2024-07-08 | End: 2024-07-08 | Stop reason: HOSPADM

## 2024-07-08 RX ORDER — ATORVASTATIN CALCIUM 40 MG/1
40 TABLET, FILM COATED ORAL DAILY
Status: DISCONTINUED | OUTPATIENT
Start: 2024-07-08 | End: 2024-07-08 | Stop reason: HOSPADM

## 2024-07-08 RX ORDER — TROSPIUM CHLORIDE 20 MG/1
20 TABLET, FILM COATED ORAL
Status: DISCONTINUED | OUTPATIENT
Start: 2024-07-08 | End: 2024-07-08 | Stop reason: HOSPADM

## 2024-07-08 RX ORDER — ACETAMINOPHEN 325 MG/1
650 TABLET ORAL EVERY 6 HOURS PRN
Status: DISCONTINUED | OUTPATIENT
Start: 2024-07-08 | End: 2024-07-08 | Stop reason: HOSPADM

## 2024-07-08 RX ORDER — SUCRALFATE 1 G/1
1 TABLET ORAL EVERY 8 HOURS SCHEDULED
Status: DISCONTINUED | OUTPATIENT
Start: 2024-07-08 | End: 2024-07-08 | Stop reason: HOSPADM

## 2024-07-08 RX ADMIN — IRON SUCROSE 100 MG: 20 INJECTION, SOLUTION INTRAVENOUS at 15:09

## 2024-07-08 RX ADMIN — SUCRALFATE 1 G: 1 TABLET ORAL at 15:11

## 2024-07-08 RX ADMIN — PANTOPRAZOLE SODIUM 40 MG: 40 TABLET, DELAYED RELEASE ORAL at 16:30

## 2024-07-08 RX ADMIN — DIVALPROEX SODIUM 125 MG: 125 CAPSULE, COATED PELLETS ORAL at 11:34

## 2024-07-08 RX ADMIN — TROSPIUM CHLORIDE 20 MG: 20 TABLET, FILM COATED ORAL at 16:30

## 2024-07-08 RX ADMIN — PANTOPRAZOLE SODIUM 40 MG: 40 TABLET, DELAYED RELEASE ORAL at 03:10

## 2024-07-08 NOTE — DISCHARGE SUMMARY
Discharge Summary    Name: Jimena Sepulveda  [id number]  YOB: 1955 (Age: 69 y.o.)   Date of Admission: 7/2/2024  Date of Discharge: 7/8/2024  Attending Physician: Micky Davis MD    Discharge Diagnosis:   Principal Problem:    GI bleed  Resolved Problems:    * No resolved hospital problems. *  Upper GI bleed  Esophagitis  Gastritis  Hematemesis  Acute blood loss anemia  Iron deficiency anemia  Clotting factor deficiency  Metabolic acidosis  Hypothyroidism  Advanced dementia  Hyperlipidemia  Psychiatric disorder  Hypokalemia    Consultations:  IP CONSULT TO GI  IP CONSULT TO HEMATOLOGY  IP CONSULT TO VASCULAR ACCESS TEAM      Brief Admission History/Reason for Admission Per Tavo Philip MD:       Brief Hospital Course by Main Problems:   69-year-old female with a history of CVA, dementia, chronic respiratory failure with hypoxia, urinary tract infection and essential hypertension with blindness who presented from a long-term care facility, [facility] rehab who presented with acute blood loss anemia secondary to hematemesis.  Patient was transfused 2 units of packed red blood cells and underwent EGD by Dr. Newman.  EGD revealed mucosal breaks of the esophagus consistent with esophagitis as well as diffuse moderate inflammation characterized by erythema of the gastric antrum with patchy moderate inflammation of the duodenum consistent with duodenitis.  Erosions were noted but no obvious bleeding.  Blood product was removed consistent with coffee-ground emesis during the procedure.  Continue on Protonix and holding all anticoagulation and NSAIDs.  Patient also has a history of a coagulation deficit of unclear etiology.  DIC panel normal and hematology consultation as patient continues to have bleeding episodes.  CTA of the abdomen pelvis negative for acute bleed.  Patient with advanced dementia and all decisions are via the spouse, Vignesh Rollins.

## 2024-07-08 NOTE — CARE COORDINATION
CM reviewed Pt medicals, Pt lives with her  and son. Pt has a PCA come in and need assistance with ADL.    Pt stated that she is wheelchair bound.     Will need PT/OT eval/recommendations.   
CM spoke with patients .  Patient is a patient of [facility].  Pt  request CM to send over and referral and clinicals back to [facility].  CM will continue to follow-up.  
Pt was returned from SNF yesterday after DC. CM contacted SNF this AM, updated clinical information and requested a bed assignment. [facility] requested that auth be obtained for pt, CM explained pt is at baseline and has not been seen by therapy, further the pt's [employer] would likely take 72 hours for auth. CM spoke w/ MICHAEL Corea at [facility] who indicated that pt's bed was not held it has been filled and that they do not have another bed to offer today. CM confirmed that pt has been a resident there for about 2 months. CM will cont to follow closely    1515 - Pt can now dc to [facility] per Devyn Crandall, [phone], admissions rep at [facility].   
Transition of Care Plan:    RUR: 18%  Prior Level of Functioning: MOD DEP  Disposition: SNF LTC  If SNF or IPR: Date FOC offered: 7/7/24  Date FOC received: 7/7/24  Accepting facility: [facility]   Date authorization started with reference number: NA  Date authorization received and expires: NA  Follow up appointments: PER SNF  DME needed: NONE  Transportation at discharge: [facility]  IM/IMM Medicare/ letter given: YES  Is patient a [place] and connected with [facility]? NO   If yes, was  transfer form completed and VA notified?   Caregiver Contact: CHIN CASTANEDA  Discharge Caregiver contacted prior to discharge? NO  Care Conference needed? NO   Barriers to discharge: NONE    CM was contacted by Devyn Cradnall, [phone], / [facility], who indicated that they CAN accept the pt back today without therapy notes and/or insurance authorization, and that they will have the pt assessed at the facility to be skilled. Pt is going to room 174B. Due to communication issues at the facility, the SNF nurse will call  nurses station to request report. CM has left multiple vm's for pt's spouse to inform of dc.   
HealthCare Decision Makers including selection of the Healthcare Decision Maker Relationship (ie \"Primary\")       Content/Action Overview:    Reviewed DNR/DNI and patient elects Full Code (Attempt Resuscitation)

## 2024-07-09 NOTE — PROGRESS NOTES
Hematology/Oncology progress note  Patient: Jimena Sepulveda MRN: [id number]     YOB: 1955  Age: 69 y.o.  Sex: female      HPI      Jimena Sepulveda is a 69 y.o. female who is being seen for GI bleed with abnormal coagulation labs.     Patient was in outpatient services for her first day of a 3-day iron infusion when she started vomiting and having large amounts of black tarry stool/blood in stool  she received a unit of blood and transferred to [place] for GI evaluation.     Apparently she had problems with GI bleeding since 2/2024 and was [facility] and then Dr. Pelayo has seen her for this and had colonoscopy which did not reveal any problems.     She is now admitted to icu  for ongoing GI bleeding. She is not on any pressors. NO other bleeding is reported. NO bruises or petechia or echycmosis. NO oozing at the iv sites.     Patient received a EGD on July 2 which showed esophagitis with no bleeding and gastritis along with duodenitis. Patient has dementia, all information pulled from chart review  and alos discussion with her  and ICU RN. Now she is having abdominal pain and maroon stools. Interestingly PT/INR is normal yesterday but today it is very high with INR of 6 , PT of 53 sec , Ptt of 55 sec normal fibrinogen.     Patient had h./o clotting disorder and had 2 strokes in the past. NO known bleeding disorder as per . Had hysterectomy and other surgeries in the past with no bleeding reported after this in the past. .    Interval history: 7/6/24: No acute event overnight. No bleeding reported. Reports diarrhea is improving. Hb is stable and improved. Coags are normal yesterday and today.      Current Facility-Administered Medications   Medication Dose Route Frequency Provider Last Rate Last Admin    0.9 % sodium chloride infusion   IntraVENous PRN Al Rhodes MD        potassium chloride (KLOR-CON M) extended release tablet 40 mEq  40 
              Hematology/Oncology progress note  Patient: Jimena Sepulveda MRN: [id number]     YOB: 1955  Age: 69 y.o.  Sex: female      HPI      Jimena Sepulveda is a 69 y.o. female who is being seen for GI bleed with abnormal coagulation labs.     Patient was in outpatient services for her first day of a 3-day iron infusion when she started vomiting and having large amounts of black tarry stool/blood in stool  she received a unit of blood and transferred to [place] for GI evaluation.     Apparently she had problems with GI bleeding since 2/2024 and was [facility] and then Dr. Pelayo has seen her for this and had colonoscopy which did not reveal any problems.     She is now admitted to icu  for ongoing GI bleeding. She is not on any pressors. NO other bleeding is reported. NO bruises or petechia or echycmosis. NO oozing at the iv sites.     Patient received a EGD on July 2 which showed esophagitis with no bleeding and gastritis along with duodenitis. Patient has dementia, all information pulled from chart review  and alos discussion with her  and ICU RN. Now she is having abdominal pain and maroon stools. Interestingly PT/INR is normal yesterday but today it is very high with INR of 6 , PT of 53 sec , Ptt of 55 sec normal fibrinogen.     Patient had h./o clotting disorder and had 2 strokes in the past. NO known bleeding disorder as per . Had hysterectomy and other surgeries in the past with no bleeding reported after this in the past. .    Interval history: 7/4/24: Hemoglobin dropped to 6.2 g/dL this morning and received blood transfusions.  Hemoglobin improved to 7.5 g/dL  Coags remain normalized INR is 1.2 and PTT is normal this morning.  She did not require any further fresh frozen plasma.  GI bleeding has subsided.  Discussed with RN in the ICU.  C. difficile is negative     Current Facility-Administered Medications   Medication Dose Route Frequency Provider 
              Hematology/Oncology progress note  Patient: Jimena Sepulveda MRN: [id number]     YOB: 1955  Age: 69 y.o.  Sex: female      HPI      Jimena Sepulveda is a 69 y.o. female who is being seen for GI bleed with abnormal coagulation labs.     Patient was in outpatient services for her first day of a 3-day iron infusion when she started vomiting and having large amounts of black tarry stool/blood in stool  she received a unit of blood and transferred to [place] for GI evaluation.     Apparently she had problems with GI bleeding since 2/2024 and was [facility] and then Dr. Pelayo has seen her for this and had colonoscopy which did not reveal any problems.     She is now admitted to icu  for ongoing GI bleeding. She is not on any pressors. NO other bleeding is reported. NO bruises or petechia or echycmosis. NO oozing at the iv sites.     Patient received a EGD on July 2 which showed esophagitis with no bleeding and gastritis along with duodenitis. Patient has dementia, all information pulled from chart review  and alos discussion with her  and ICU RN. Now she is having abdominal pain and maroon stools. Interestingly PT/INR is normal yesterday but today it is very high with INR of 6 , PT of 53 sec , Ptt of 55 sec normal fibrinogen.     Patient had h./o clotting disorder and had 2 strokes in the past. NO known bleeding disorder as per . Had hysterectomy and other surgeries in the past with no bleeding reported after this in the past. .    Interval history: 7/5/24: Hemoglobin dropped to 6.8 g/dLlast night  and received blood transfusions.Hemoglobin is 8.7gm/dl.this morning.repeat coags are pending today.  She did not require any further fresh frozen plasma. Repeat   GI bleeding has subsided.  Discussed with RN in the ICU.  C. difficile is negative     Current Facility-Administered Medications   Medication Dose Route Frequency Provider Last Rate Last Admin    0.9 
       Hospitalist Progress Note    NAME:   Jimena Sepulveda   : 1955   MRN: [id number]     Subjective:   Daily Progress Note:  2024    Chief complaint:  Chief Complaint   Patient presents with    GI Bleeding         Hospital course to date/HPI from H&P:    69-year-old female with a history of CVA, dementia, chronic respiratory failure with hypoxia, urinary tract infection and essential hypertension with blindness who presented from a long-term care facility, [facility] and rehab who presented with acute blood loss anemia secondary to hematemesis.  Patient was transfused 2 units of packed red blood cells and underwent EGD by Dr. Newman.  EGD revealed mucosal breaks of the esophagus consistent with esophagitis as well as diffuse moderate inflammation characterized by erythema of the gastric antrum with patchy moderate inflammation of the duodenum consistent with duodenitis.  Erosions were noted but no obvious bleeding.  Blood product was removed consistent with coffee-ground emesis during the procedure.  Continue on Protonix and holding all anticoagulation and NSAIDs.  Patient also has a history of a coagulation deficit of unclear etiology.  DIC panel normal and hematology consultation as patient continues to have bleeding episodes.  CTA of the abdomen pelvis negative for acute bleed.  Patient with advanced dementia and all decisions are via the spouse, Vignesh Rollins.  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  No further bleeding episodes.  Hemoglobin has increased over the last 48 hours with no further bleeding episodes.  Patient will follow-up with hematology in regards to the clotting deficiency and mixing study results.  Patient will continue Protonix twice daily and sucralfate to assist with the healing of the 
      Hospitalist Progress Note    NAME:   Jimena Sepulveda   : 1955   MRN: [id number]     Date/Time: 2024 1:42 PM  Patient PCP: Bertha Contreras APRN - NP    Estimated discharge date: 24 hours  Barriers: Resolution of GI bleed, clearance from hematology    Hospital course:    69-year-old female with a history of CVA, dementia, chronic respiratory failure with hypoxia, urinary tract infection and essential hypertension with blindness who presented from a long-term care facility, [facility] and rehab who presented with acute blood loss anemia secondary to hematemesis.  Patient was transfused 2 units of packed red blood cells and underwent EGD by Dr. Newman.  EGD revealed mucosal breaks of the esophagus consistent with esophagitis as well as diffuse moderate inflammation characterized by erythema of the gastric antrum with patchy moderate inflammation of the duodenum consistent with duodenitis.  Erosions were noted but no obvious bleeding.  Blood product was removed consistent with coffee-ground emesis during the procedure.  Continue on Protonix drip and holding all anticoagulation and NSAIDs.  Patient also has a history of a coagulation deficit of unclear etiology.  DIC panel normal and hematology consultation as patient continues to have bleeding episodes.  CTA of the abdomen pelvis negative for acute bleed.  Patient with advanced dementia and all decisions are via the spouse, Vignesh Rollins.  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  Hematology performing mixing studies and factor VIII is slightly elevated although patient did receive 2 units of FFP.  No further bleeding episodes.  Most likely ready for discharge within 24 hours.  Still unable to obtain medical reconciliation from [place].      Assessment / Plan:    Acute upper GI bleed  Hold aspirin  Continue Protonix twice daily  EGD with gastritis, duodenitis and 
  Physician Progress Note      PATIENT:               NNAMDI CASTANEDA  CSN #:                  [id number]  :                       1955  ADMIT DATE:       2024 12:28 PM  DISCH DATE:  RESPONDING  PROVIDER #:        Eugene Hammond PA-C          QUERY TEXT:    Patient admitted with GI bleeding. Noted to have dietician assessment with   malnutrition diagnosis in 7/3 note. If possible, please document in progress   notes and discharge summary if you are evaluating and /or treating any of the   following:    The medical record reflects the following:  Risk Factors: 68 yo female with anemia, weight loss,  dementia  Clinical Indicators: Albumin 2.3;  Nutritional Consult: Severe malnutrition -   Greater than 7.5% weight loss over 3 months - Mild body fat loss Triceps-   Moderate muscle mass loss Thigh (quadriceps), Calf (gastrocnemius)  Treatment: Ensure shakes x2/d    Thank you,  Mey Vazquez RN, CCDS    ASPEN Criteria:    https://aspenjournals.onlinelibrary.bashir.com/doi/full/10.1177/265982936946913  5  Options provided:  -- Protein calorie malnutrition severe  -- Other - I will add my own diagnosis  -- Disagree - Not applicable / Not valid  -- Disagree - Clinically unable to determine / Unknown  -- Refer to Clinical Documentation Reviewer    PROVIDER RESPONSE TEXT:    This patient has severe protein calorie malnutrition.    Query created by: Mey Vaqzuez on 2024 1:22 PM      Electronically signed by:  Eugene Hammond PA-C 2024 1:59 PM          
2056- Low BP noted in 90's SBP and map between 64-66. Patient is asymptomatic. Informed Dr. Rosenberg and ordered to repeat hemoglobin.    2200- Hemoglobin resulted 6.8, latest /41 map 58. Dr. Rosenberg ordered to give 1 unit PRBC.  
4 Eyes Skin Assessment     NAME:  Jimena Sepulveda  YOB: 1955  MEDICAL RECORD NUMBER:  [id number]    The patient is being assessed for  Transfer to New Unit    I agree that at least one RN has performed a thorough Head to Toe Skin Assessment on the patient. ALL assessment sites listed below have been assessed.      Areas assessed by both nurses:    Head, Face, Ears, Shoulders, Back, Chest, Arms, Elbows, Hands, Sacrum. Buttock, Coccyx, Ischium, Legs. Feet and Heels, Under Medical Devices , and Other ***        Does the Patient have a Wound? No noted wound(s)       Jr Prevention initiated by RN: No  Wound Care Orders initiated by RN: No    Pressure Injury (Stage 3,4, Unstageable, DTI, NWPT, and Complex wounds) if present, place Wound referral order by RN under : No    New Ostomies, if present place, Ostomy referral order under : No     Nurse 1 eSignature: Electronically signed by Pepito Regalado RN on 7/6/24 at 4:41 PM EDT    **SHARE this note so that the co-signing nurse can place an eSignature**    Nurse 2 eSignature: {Esignature:[id number]}   
Comprehensive Nutrition Assessment    Type and Reason for Visit:  Initial, Positive Nutrition Screen (MST 2)    Nutrition Recommendations/Plan:   NPO, when medically appropriate consider starting diet and advancing to goal fo GI [place], DONNA as appropriate/tolerated.    Ensure shakes x2/d -- encourage vanilla or strawberry flavors vs chocolate given concern gastritis.    Monitor and record PO and ONS intakes, BM in I/Os.      Malnutrition Assessment:  Malnutrition Status:  Severe malnutrition (07/03/24 1147)    Context:  Acute Illness     Findings of the 6 clinical characteristics of malnutrition:  Energy Intake:  50% or less of estimated energy requirements for 5 or more days  Weight Loss:  Greater than 7.5% over 3 months     Body Fat Loss:  Mild body fat loss Triceps   Muscle Mass Loss:  Moderate muscle mass loss Thigh (quadriceps), Calf (gastrocnemius)  Fluid Accumulation:  No significant fluid accumulation     Strength:  Not Performed    Nutrition Assessment:    Transferred from [facility] for anemia, hematemesis and hematochezia. Screened for MST 2 d/t noted unsure weight loss PTA. Pt's  at bedside, endorsed poor intakes x6 weeks d/t losing dentures and no feeding assistance w/ meals offered at [facility]. Pt's  denied modifying of meals for better intakes, no ONS offered despite Pt's family requesting supplements. Pt currently NPO, EGD on 7/2 w/ findings of gastritis, duodenitis and esophagitis w/ mucosal breakdown, no obvious bleeding source. 2 uPRBCs given. CT abd done today, results pending. Noted concern for perforation; if no SBO nor perforation on CT scan, consider advancing to goal of GI [place], DONNA and add Ensure shakes. Labs: K 3.3, Cl 110, , BUN 27, Ca 7.6, AST 40, H/H 8.4/27.1. Meds: LCR, PPI, compazine, zofran.    Nutrition Related Findings:    Brief NFPE w/ moderate fat and muscle wasting. Multiple episodes of coffee ground emesis x24 hrs- on meds for management. +intolerance w/ CLD x24 hrs 
Nurse to nurse report given to Anderia ABEBE) at [facility].    
Patient discharged from hospital and is being [facility]  [facility]. [facility] called 5 times and got a busy tone.  was notified of discharge and transfer.  
Patient is for discharge to [facility]. [facility] the [facility], [facility].  
Patient refused vitals at this time. She is in bed waiting on breakfast to arrive  
Pt unable to take po potassium per protocol and per PA no need to give IV potassium. Potassium level is 3.5. Will recheck in the AM.   
TRANSFER - IN REPORT:    Verbal report received from HERRERA Smith on Jimena Sepulveda  being received from ED for routine progression of patient care      Report consisted of patient's Situation, Background, Assessment and   Recommendations(SBAR).     Information from the following report(s) Nurse Handoff Report was reviewed with the receiving nurse.    Opportunity for questions and clarification was provided.      Assessment completed upon patient's arrival to unit and care assumed.     
TRANSFER - OUT REPORT:    Verbal report given to HERRERA Beyer on Jimena Sepulveda  being transferred to Replaced by [facility] for routine progression of patient care       Report consisted of patient's Situation, Background, Assessment and   Recommendations(SBAR).     Information from the following report(s) Nurse Handoff Report, Intake/Output, MAR, Recent Results, and Neuro Assessment was reviewed with the receiving nurse.           Lines:   Peripheral IV 07/04/24 Left;Ventral Forearm (Active)   Site Assessment Clean, dry & intact 07/06/24 1109   Line Status Flushed;No blood return;Normal saline locked;Capped 07/06/24 1109   Line Care Cap changed;Connections checked and tightened 07/06/24 1109   Phlebitis Assessment No symptoms 07/06/24 1109   Infiltration Assessment 0 07/06/24 1109   Alcohol Cap Used Yes 07/06/24 1109   Dressing Status Clean, dry & intact 07/06/24 1109   Dressing Type Transparent w/CHG gel 07/06/24 1109   Dressing Intervention New 07/04/24 1200        Opportunity for questions and clarification was provided.      Patient transported with:  Registered Nurse        
personally reviewed and interpreted, includes: Chest x-ray normal  [x] Data Review (any 3)  [x] All available Consultant notes from yesterday/today were reviewed  [x] All current labs were reviewed and interpreted for clinical significance   [x] Appropriate follow-up labs were ordered  [] Collateral history obtained from:           Code Status: Full code  DVT Prophylaxis: SCDs due to bleeding  GI Prophylaxis: Protonix    Subjective:     Chief Complaint / Reason for Physician Visit  Confused.  Discussed with RN events overnight.       Objective:     VITALS:   Last 24hrs VS reviewed since prior progress note. Most recent are:  Patient Vitals for the past 24 hrs:   BP Temp Temp src Pulse Resp SpO2   07/05/24 1100 138/62 98.1 °F (36.7 °C) Oral 75 22 100 %   07/05/24 1000 (!) 136/112 -- -- 73 13 100 %   07/05/24 0900 130/67 -- -- 71 18 100 %   07/05/24 0800 (!) 116/51 -- -- 71 14 --   07/05/24 0700 (!) 123/50 98 °F (36.7 °C) Oral 70 19 98 %   07/05/24 0600 120/69 -- -- 71 13 99 %   07/05/24 0500 119/64 -- -- 68 17 98 %   07/05/24 0400 (!) 138/49 97.9 °F (36.6 °C) Axillary 79 21 99 %   07/05/24 0300 (!) 124/51 97.9 °F (36.6 °C) Axillary 73 16 97 %   07/05/24 0200 (!) 135/54 -- -- 73 20 97 %   07/05/24 0100 (!) 119/59 98.4 °F (36.9 °C) Oral 73 16 99 %   07/05/24 0057 (!) 103/57 98.4 °F (36.9 °C) Oral 68 17 97 %   07/05/24 0000 (!) 114/46 98 °F (36.7 °C) Oral 69 19 97 %   07/04/24 2315 -- 98 °F (36.7 °C) Oral -- -- --   07/04/24 2300 (!) 116/59 -- -- 92 22 96 %   07/04/24 2245 (!) 114/44 98.3 °F (36.8 °C) Oral 69 18 94 %   07/04/24 2219 (!) 103/50 98.3 °F (36.8 °C) Oral 78 18 94 %   07/04/24 2200 (!) 103/41 -- -- 69 18 96 %   07/04/24 2100 (!) 107/47 -- -- 68 19 96 %   07/04/24 2000 (!) 98/47 97.9 °F (36.6 °C) Oral 69 21 98 %   07/04/24 1930 -- 97.9 °F (36.6 °C) Oral -- -- --   07/04/24 1900 (!) 100/37 -- -- 72 17 96 %   07/04/24 1730 (!) 107/47 -- -- 78 19 98 %   07/04/24 1700 -- -- -- 89 19 92 %   07/04/24 1600 (!) 121/51 
  Extremities No visible deformities or edema.    Skin No rashes or echymosis  No petechiae, purpura.   Neurologic Alert, confused.      Lab/Data Review:  Recent Labs     07/05/24 0218 07/06/24  0300 07/07/24 0619   WBC 6.1 5.3 5.2   HGB 8.7* 9.8* 10.8*   HCT 26.6* 31.0* 33.4*   * 127* 149*       Recent Labs     07/05/24 0218 07/05/24  1045 07/06/24  0300 07/07/24 0619     --  139 140   K 3.6  --  3.5 3.4*   *  --  108 109*   CO2 24  --  25 25   BUN 9  --  5* 3*   ALT 16  --  17 15   INR  --  1.1 1.0  --        No results for input(s): \"PH\", \"PCO2\", \"PO2\", \"HCO3\", \"FIO2\" in the last 72 hours.  Recent Results (from the past 24 hour(s))   CBC with Auto Differential    Collection Time: 07/07/24  6:19 AM   Result Value Ref Range    WBC 5.2 3.6 - 11.0 K/uL    RBC 4.10 3.80 - 5.20 M/uL    Hemoglobin 10.8 (L) 11.5 - 16.0 g/dL    Hematocrit 33.4 (L) 35.0 - 47.0 %    MCV 81.5 80.0 - 99.0 FL    MCH 26.3 26.0 - 34.0 PG    MCHC 32.3 30.0 - 36.5 g/dL    RDW 19.1 (H) 11.5 - 14.5 %    Platelets 149 (L) 150 - 400 K/uL    MPV 10.3 8.9 - 12.9 FL    Nucleated RBCs 0.0 0.0  WBC    nRBC 0.00 0.00 - 0.01 K/uL    Neutrophils % 61 32 - 75 %    Lymphocytes % 24 12 - 49 %    Monocytes % 11 5 - 13 %    Eosinophils % 2 0 - 7 %    Basophils % 1 0 - 1 %    Immature Granulocytes % 1 (H) 0 - 0.5 %    Neutrophils Absolute 3.2 1.8 - 8.0 K/UL    Lymphocytes Absolute 1.3 0.8 - 3.5 K/UL    Monocytes Absolute 0.6 0.0 - 1.0 K/UL    Eosinophils Absolute 0.1 0.0 - 0.4 K/UL    Basophils Absolute 0.0 0.0 - 0.1 K/UL    Immature Granulocytes Absolute 0.1 (H) 0.00 - 0.04 K/UL    Differential Type AUTOMATED     Comprehensive Metabolic Panel    Collection Time: 07/07/24  6:19 AM   Result Value Ref Range    Sodium 140 136 - 145 mmol/L    Potassium 3.4 (L) 3.5 - 5.1 mmol/L    Chloride 109 (H) 97 - 108 mmol/L    CO2 25 21 - 32 mmol/L    Anion Gap 6 5 - 15 mmol/L    Glucose 102 (H) 65 - 100 mg/dL    BUN 3 (L) 6 - 20 mg/dL    Creatinine 
1511 (!) 175/90 -- -- 95 18 -- -- 69 kg (152 lb 1.9 oz)   07/02/24 1414 (!) 181/74 -- -- 95 15 -- -- --   07/02/24 1359 (!) 180/76 -- -- (!) 117 21 100 % -- --   07/02/24 1331 (!) 182/70 -- -- 93 16 99 % -- --   07/02/24 1321 (!) 177/127 -- -- -- -- -- -- --   07/02/24 1310 (!) 170/68 -- -- 92 20 98 % -- --   07/02/24 1307 -- -- -- 90 30 98 % -- --   07/02/24 1301 (!) 170/69 -- -- 89 30 98 % -- --   07/02/24 1241 (!) 168/72 -- -- 86 17 99 % -- --   07/02/24 1238 -- 98.6 °F (37 °C) -- -- -- -- -- --   07/02/24 1234 (!) 172/76 -- -- 91 18 100 % -- --   07/02/24 1233 -- -- -- -- -- -- 1.626 m (5' 4\") 87.1 kg (192 lb)         Intake/Output Summary (Last 24 hours) at 7/3/2024 1102  Last data filed at 7/3/2024 0400  Gross per 24 hour   Intake 1453.33 ml   Output --   Net 1453.33 ml        I had a face to face encounter and independently examined this patient on 7/3/2024, as outlined below:    Review of Systems   Unable to perform ROS: Dementia        PHYSICAL EXAM:  Physical Exam  Vitals reviewed.   HENT:      Head: Normocephalic and atraumatic.   Cardiovascular:      Rate and Rhythm: Normal rate and regular rhythm.      Heart sounds: Normal heart sounds.   Pulmonary:      Effort: Pulmonary effort is normal.      Breath sounds: Normal breath sounds.   Abdominal:      General: Abdomen is flat. Bowel sounds are normal.      Palpations: Abdomen is soft.      Comments: Tenderness present in epigastric region   Musculoskeletal:         General: Normal range of motion.      Cervical back: Normal range of motion and neck supple.   Skin:     General: Skin is warm and dry.   Neurological:      Mental Status: She is alert. She is disoriented.          Reviewed most current lab test results and cultures  YES  Reviewed most current radiology test results   YES  Review and summation of old records today    NO  Reviewed patient's current orders and MAR    YES  PMH/SH reviewed - no change compared to 
H&P  ________________________________________________________________________  Care Plan discussed with:    Comments   Patient x    Family      RN x    Care Manager     Consultant                        Multidiciplinary team rounds were held today with , nursing, pharmacist and clinical coordinator.  Patient's plan of care was discussed; medications were reviewed and discharge planning was addressed.     ________________________________________________________________________  Total NON critical care TIME:  35  Minutes    Total CRITICAL CARE TIME Spent:   Minutes non procedure based      Comments   >50% of visit spent in counseling and coordination of care     ________________________________________________________________________  Eugene Hammond PA-C     Procedures: see electronic medical records for all procedures/Xrays and details which were not copied into this note but were reviewed prior to creation of Plan.      LABS:  I reviewed today's most current labs and imaging studies.  Pertinent labs include:  Recent Labs     07/03/24  0240 07/03/24  0925 07/04/24  0312   WBC 6.0 6.8 6.4   HGB 8.4* 7.9* 6.2*   HCT 27.1* 24.2* 20.0*    166 127*       Recent Labs     07/02/24  1045 07/03/24  0240 07/03/24  1315 07/03/24  1630 07/03/24  1903 07/04/24  0312    142  --   --   --  145   K 3.5 3.3*  --   --   --  2.9*   CL 98 110*  --   --   --  113*   CO2 19* 21  --   --   --  27   BUN 27* 27*  --   --   --  16   ALT 15 18  --   --   --  17   INR <0.8*  --    < > 6.0* 1.6* 1.2*    < > = values in this interval not displayed.         Signed: Eugene Hamomnd PA-C

## 2024-07-15 LAB
Lab: NORMAL
Lab: NORMAL
REFERENCE LAB: NORMAL

## 2024-11-25 ENCOUNTER — HOSPITAL ENCOUNTER (OUTPATIENT)
Facility: HOSPITAL | Age: 69
Setting detail: INFUSION SERIES
Discharge: HOME | End: 2024-11-25
Payer: MEDICARE

## 2024-11-25 VITALS
TEMPERATURE: 98.2 F | RESPIRATION RATE: 16 BRPM | HEART RATE: 67 BPM | DIASTOLIC BLOOD PRESSURE: 55 MMHG | SYSTOLIC BLOOD PRESSURE: 110 MMHG

## 2024-11-25 PROCEDURE — 96365 THER/PROPH/DIAG IV INF INIT: CPT

## 2024-11-25 PROCEDURE — 6360000002 HC RX W HCPCS: Performed by: INTERNAL MEDICINE

## 2024-11-25 PROCEDURE — 2580000003 HC RX 258: Performed by: INTERNAL MEDICINE

## 2024-11-25 RX ADMIN — SODIUM CHLORIDE: 9 INJECTION, SOLUTION INTRAVENOUS at 10:23

## 2024-11-25 ASSESSMENT — PAIN SCALES - GENERAL
PAINLEVEL_OUTOF10: 0
PAINLEVEL_OUTOF10: 0

## 2024-11-25 NOTE — PROGRESS NOTES
Report called to MIAH Corbin at [facility]. D/C papers printed and sent back with patient. [facility] transport notified to return to [facility] and  patient to return to nursing home.Spouse remains at patients bedside.

## 2024-11-25 NOTE — DISCHARGE INSTRUCTIONS
Return to [facility] 12/02/24, 12/09/24, 12/16/24, and 12/23/24 for Venofer infusions.

## 2024-11-25 NOTE — PROGRESS NOTES
Discharged via stretcher to return back to [facility] nursing home. Alert and talking and laughing with staff.

## 2024-11-25 NOTE — PROGRESS NOTES
Patient arrived via stretcher from Nursing home accompanied by spouse.  IV accessed and iron infusion started. Lungs clear in all lobes. Call light with in reach . Spouse at side.

## 2024-12-02 ENCOUNTER — HOSPITAL ENCOUNTER (OUTPATIENT)
Facility: HOSPITAL | Age: 69
Setting detail: INFUSION SERIES
Discharge: HOME | End: 2024-12-02
Payer: MEDICARE

## 2024-12-02 VITALS
RESPIRATION RATE: 18 BRPM | HEART RATE: 65 BPM | DIASTOLIC BLOOD PRESSURE: 48 MMHG | OXYGEN SATURATION: 100 % | TEMPERATURE: 98.4 F | SYSTOLIC BLOOD PRESSURE: 113 MMHG

## 2024-12-02 PROCEDURE — 96365 THER/PROPH/DIAG IV INF INIT: CPT

## 2024-12-02 PROCEDURE — 6360000002 HC RX W HCPCS: Performed by: INTERNAL MEDICINE

## 2024-12-02 PROCEDURE — 2580000003 HC RX 258: Performed by: INTERNAL MEDICINE

## 2024-12-02 RX ADMIN — IRON SUCROSE: 20 INJECTION, SOLUTION INTRAVENOUS at 11:05

## 2024-12-02 ASSESSMENT — PAIN SCALES - GENERAL
PAINLEVEL_OUTOF10: 0
PAINLEVEL_OUTOF10: 0

## 2024-12-02 NOTE — PROGRESS NOTES
Recived via stretcher by [facility] ambulance service for IV Iron infusion plan of care discussed with patient and her  lungs clear.

## 2024-12-02 NOTE — DISCHARGE INSTRUCTIONS
1245 Return to [facility] on 12-9-24, 12-16-24, 12-23-24 for next Venofer in fusion

## 2024-12-09 ENCOUNTER — HOSPITAL ENCOUNTER (OUTPATIENT)
Facility: HOSPITAL | Age: 69
Setting detail: INFUSION SERIES
Discharge: HOME | End: 2024-12-09
Payer: MEDICARE

## 2024-12-09 VITALS
DIASTOLIC BLOOD PRESSURE: 87 MMHG | OXYGEN SATURATION: 97 % | HEART RATE: 69 BPM | TEMPERATURE: 97.7 F | SYSTOLIC BLOOD PRESSURE: 143 MMHG | RESPIRATION RATE: 18 BRPM

## 2024-12-09 PROCEDURE — 6360000002 HC RX W HCPCS: Performed by: INTERNAL MEDICINE

## 2024-12-09 PROCEDURE — 96365 THER/PROPH/DIAG IV INF INIT: CPT

## 2024-12-09 PROCEDURE — 2580000003 HC RX 258: Performed by: INTERNAL MEDICINE

## 2024-12-09 RX ADMIN — IRON SUCROSE: 20 INJECTION, SOLUTION INTRAVENOUS at 10:21

## 2024-12-09 ASSESSMENT — PAIN SCALES - GENERAL
PAINLEVEL_OUTOF10: 0
PAINLEVEL_OUTOF10: 0

## 2024-12-09 NOTE — PROGRESS NOTES
Venofer conpleted at 1052. Jose called for transportation. Report call to HIMA Gilliam Lpn at [facility].

## 2024-12-16 ENCOUNTER — HOSPITAL ENCOUNTER (OUTPATIENT)
Facility: HOSPITAL | Age: 69
Setting detail: INFUSION SERIES
Discharge: HOME | End: 2024-12-16
Payer: MEDICARE

## 2024-12-16 VITALS
RESPIRATION RATE: 20 BRPM | SYSTOLIC BLOOD PRESSURE: 116 MMHG | TEMPERATURE: 97 F | DIASTOLIC BLOOD PRESSURE: 54 MMHG | HEART RATE: 70 BPM

## 2024-12-16 PROCEDURE — 6360000002 HC RX W HCPCS: Performed by: FAMILY MEDICINE

## 2024-12-16 PROCEDURE — 2580000003 HC RX 258: Performed by: FAMILY MEDICINE

## 2024-12-16 PROCEDURE — 96365 THER/PROPH/DIAG IV INF INIT: CPT

## 2024-12-16 RX ADMIN — IRON SUCROSE 200 MG: 20 INJECTION, SOLUTION INTRAVENOUS at 10:14

## 2024-12-16 ASSESSMENT — PAIN SCALES - GENERAL
PAINLEVEL_OUTOF10: 0
PAINLEVEL_OUTOF10: 0

## 2024-12-16 NOTE — PROGRESS NOTES
Report Called to Nurse Ursula DOOLEY, Jose in patient Transferred back to [facility] via stretcher

## 2024-12-16 NOTE — PROGRESS NOTES
IV started in the right wrist after one unsuccessful stick with # 22 abbocath. 1010 Venofer infusion began with no problems  at bedside

## 2024-12-23 ENCOUNTER — HOSPITAL ENCOUNTER (OUTPATIENT)
Facility: HOSPITAL | Age: 69
Setting detail: INFUSION SERIES
Discharge: HOME | End: 2024-12-23
Payer: MEDICARE

## 2024-12-23 VITALS
SYSTOLIC BLOOD PRESSURE: 134 MMHG | OXYGEN SATURATION: 97 % | TEMPERATURE: 97.7 F | HEART RATE: 70 BPM | DIASTOLIC BLOOD PRESSURE: 50 MMHG | RESPIRATION RATE: 16 BRPM

## 2024-12-23 PROCEDURE — 96365 THER/PROPH/DIAG IV INF INIT: CPT

## 2024-12-23 PROCEDURE — 6360000002 HC RX W HCPCS: Performed by: INTERNAL MEDICINE

## 2024-12-23 PROCEDURE — 2580000003 HC RX 258: Performed by: INTERNAL MEDICINE

## 2024-12-23 RX ADMIN — IRON SUCROSE: 20 INJECTION, SOLUTION INTRAVENOUS at 10:30

## 2024-12-23 ASSESSMENT — PAIN SCALES - GENERAL
PAINLEVEL_OUTOF10: 0
PAINLEVEL_OUTOF10: 0

## 2024-12-23 NOTE — PROGRESS NOTES
Patient arrived via stretcher from [facility] accompanied by spouse. VSS. IV started and medication infusion started without complications. Soda and crackers offered. Spouse remains at patients side. Patient is blind. Informed patient of each event as performed so that patient was aware.

## 2024-12-23 NOTE — PROGRESS NOTES
Infusion complete at 1110 without complications. IV d/c'd.  VSS. Report called to Coco Cordero LPN at [facility]. [facility] No complaints.

## 2025-02-14 ENCOUNTER — OFFICE VISIT (OUTPATIENT)
Age: 70
End: 2025-02-14

## 2025-02-14 VITALS
BODY MASS INDEX: 27.31 KG/M2 | WEIGHT: 160 LBS | RESPIRATION RATE: 18 BRPM | HEIGHT: 64 IN | SYSTOLIC BLOOD PRESSURE: 122 MMHG | HEART RATE: 73 BPM | DIASTOLIC BLOOD PRESSURE: 68 MMHG | OXYGEN SATURATION: 97 % | TEMPERATURE: 98.2 F

## 2025-02-14 DIAGNOSIS — D50.0 IRON DEFICIENCY ANEMIA DUE TO CHRONIC BLOOD LOSS: ICD-10-CM

## 2025-02-14 DIAGNOSIS — Z86.0100 HISTORY OF COLON POLYPS: ICD-10-CM

## 2025-02-14 DIAGNOSIS — R19.5 OCCULT BLOOD POSITIVE STOOL: Primary | ICD-10-CM

## 2025-02-14 RX ORDER — POLYETHYLENE GLYCOL 3350 17 G/17G
POWDER, FOR SOLUTION ORAL
Qty: 510 G | Refills: 0 | Status: SHIPPED | OUTPATIENT
Start: 2025-02-14

## 2025-02-14 NOTE — PROGRESS NOTES
Chief Complaint   Patient presents with    Follow-up     Positive hemoccult       \"Have you been to the ER, urgent care clinic since your last visit?  Hospitalized since your last visit?\"    NO    “Have you seen or consulted any other health care providers outside our system since your last visit?”    NO    Have you had a mammogram?”   NO    No breast cancer screening on file

## 2025-02-17 ENCOUNTER — PREP FOR PROCEDURE (OUTPATIENT)
Age: 70
End: 2025-02-17

## 2025-02-17 DIAGNOSIS — D50.0 IRON DEFICIENCY ANEMIA SECONDARY TO BLOOD LOSS (CHRONIC): ICD-10-CM

## 2025-02-17 DIAGNOSIS — R19.5 ABNORMAL FECES: ICD-10-CM

## 2025-02-17 DIAGNOSIS — Z86.0100 HX OF COLONIC POLYPS: ICD-10-CM

## 2025-02-17 NOTE — PROGRESS NOTES
PT NEEDS TO BE SCHEDULED FOR A COLONOSCOPY. NEED TO CALL [facility] [phone]. CALLED TODAY MARLON MALIK WAS IN A MEETING THEREFOR WILL CALL BACK TOMORROW.

## 2025-02-17 NOTE — PROGRESS NOTES
SPOKE WITH [facility] TAPIA. SCHEDULED COLONSCOPY FOR 3/19/25. FAXED ORDERS AND PRE OP INSTRUCTIONS TO FACILITY [phone]

## 2025-02-20 ASSESSMENT — ENCOUNTER SYMPTOMS
BLOOD IN STOOL: 0
DIARRHEA: 0
ALLERGIC/IMMUNOLOGIC NEGATIVE: 1
VOMITING: 0
RECTAL PAIN: 0
CONSTIPATION: 0
RESPIRATORY NEGATIVE: 1
ABDOMINAL DISTENTION: 0
NAUSEA: 0
ABDOMINAL PAIN: 0
BACK PAIN: 1
ANAL BLEEDING: 0

## 2025-02-20 NOTE — H&P (VIEW-ONLY)
content normal.         Judgment: Judgment normal.        1. Occult blood positive stool  We will schedule patient for an upper endoscopy to evaluate the occult bleeding.  - COLONOSCOPY,DIAGNOSTIC  - polyethylene glycol (GLYCOLAX) 17 GM/SCOOP powder; Use as directed by physician.  Dispense: 510 g; Refill: 0    2. Iron deficiency anemia due to chronic blood loss  Schedule patient with iron deficiency anemia.  - COLONOSCOPY,DIAGNOSTIC  - polyethylene glycol (GLYCOLAX) 17 GM/SCOOP powder; Use as directed by physician.  Dispense: 510 g; Refill: 0    3. History of colon polyps

## 2025-02-20 NOTE — PROGRESS NOTES
Jimena Sepulveda is a 69 y.o. female who presents today for the following:  Chief Complaint   Patient presents with    Follow-up     Positive hemoccult           Allergies   Allergen Reactions    Food Rash     Terriaki sauce    Penicillins Nausea And Vomiting       Current Outpatient Medications   Medication Sig Dispense Refill    polyethylene glycol (GLYCOLAX) 17 GM/SCOOP powder Use as directed by physician. 510 g 0    pantoprazole (PROTONIX) 40 MG tablet Take 1 tablet by mouth in the morning and at bedtime 90 tablet 1    sucralfate (CARAFATE) 1 GM tablet Take 1 tablet by mouth 4 times daily 120 tablet 3    ferrous sulfate (IRON 325) 325 (65 Fe) MG tablet Take 1 tablet by mouth 2 times daily 180 tablet 1    divalproex (DEPAKOTE SPRINKLE) 125 MG DR capsule Take 1 capsule by mouth 2 times daily      acetaminophen (TYLENOL) 325 MG tablet Take 2 tablets by mouth every 6 hours as needed for Pain      QUEtiapine (SEROQUEL) 100 MG tablet Take 1.5 tablets by mouth at bedtime      sertraline (ZOLOFT) 100 MG tablet Take 2 tablets by mouth daily      donepezil (ARICEPT) 10 MG tablet TAKE 1 TABLET BY MOUTH EVERYDAY AT BEDTIME      levothyroxine (SYNTHROID) 150 MCG tablet Take 125 mcg by mouth every morning (before breakfast)      metoprolol tartrate (LOPRESSOR) 25 MG tablet Take 1 tablet by mouth 2 times daily      atorvastatin (LIPITOR) 40 MG tablet Take 1 tablet by mouth daily       No current facility-administered medications for this visit.       Past Medical History:   Diagnosis Date    Adjustment disorder with mixed disturbance of emotions and conduct     Anemia     Blind     Blood in stool 06/02/2023    Calculus of kidney 03/24/2024    Calculus of ureter 03/21/2024    Cerebral infarction (HCC)     Chronic obstructive pulmonary disease (HCC)     Chronic respiratory failure with hypoxia (HCC)     Coagulation defect     Cognitive communication deficit     Dementia (HCC)     Depression     Diarrhea     History of colonic

## 2025-03-19 ENCOUNTER — ANESTHESIA EVENT (OUTPATIENT)
Facility: HOSPITAL | Age: 70
End: 2025-03-19
Payer: MEDICARE

## 2025-03-19 ENCOUNTER — ANESTHESIA (OUTPATIENT)
Facility: HOSPITAL | Age: 70
End: 2025-03-19
Payer: MEDICARE

## 2025-03-19 ENCOUNTER — HOSPITAL ENCOUNTER (OUTPATIENT)
Facility: HOSPITAL | Age: 70
Setting detail: OUTPATIENT SURGERY
Discharge: HOME | End: 2025-03-19
Attending: INTERNAL MEDICINE | Admitting: INTERNAL MEDICINE
Payer: MEDICARE

## 2025-03-19 VITALS
WEIGHT: 170 LBS | HEART RATE: 61 BPM | HEIGHT: 66 IN | TEMPERATURE: 97.7 F | SYSTOLIC BLOOD PRESSURE: 130 MMHG | BODY MASS INDEX: 27.32 KG/M2 | RESPIRATION RATE: 20 BRPM | DIASTOLIC BLOOD PRESSURE: 61 MMHG | OXYGEN SATURATION: 96 %

## 2025-03-19 PROCEDURE — 7100000011 HC PHASE II RECOVERY - ADDTL 15 MIN: Performed by: INTERNAL MEDICINE

## 2025-03-19 PROCEDURE — 88305 TISSUE EXAM BY PATHOLOGIST: CPT

## 2025-03-19 PROCEDURE — 7100000010 HC PHASE II RECOVERY - FIRST 15 MIN: Performed by: INTERNAL MEDICINE

## 2025-03-19 PROCEDURE — 3700000001 HC ADD 15 MINUTES (ANESTHESIA): Performed by: INTERNAL MEDICINE

## 2025-03-19 PROCEDURE — 3600007502: Performed by: INTERNAL MEDICINE

## 2025-03-19 PROCEDURE — 3600007512: Performed by: INTERNAL MEDICINE

## 2025-03-19 PROCEDURE — 3700000000 HC ANESTHESIA ATTENDED CARE: Performed by: INTERNAL MEDICINE

## 2025-03-19 PROCEDURE — 6360000002 HC RX W HCPCS: Performed by: NURSE ANESTHETIST, CERTIFIED REGISTERED

## 2025-03-19 PROCEDURE — 2709999900 HC NON-CHARGEABLE SUPPLY: Performed by: INTERNAL MEDICINE

## 2025-03-19 PROCEDURE — 2580000003 HC RX 258: Performed by: INTERNAL MEDICINE

## 2025-03-19 RX ORDER — SODIUM CHLORIDE 9 MG/ML
25 INJECTION, SOLUTION INTRAVENOUS PRN
Status: DISCONTINUED | OUTPATIENT
Start: 2025-03-19 | End: 2025-03-19 | Stop reason: HOSPADM

## 2025-03-19 RX ORDER — LIDOCAINE HYDROCHLORIDE 20 MG/ML
INJECTION, SOLUTION EPIDURAL; INFILTRATION; INTRACAUDAL; PERINEURAL
Status: DISCONTINUED | OUTPATIENT
Start: 2025-03-19 | End: 2025-03-19 | Stop reason: SDUPTHER

## 2025-03-19 RX ORDER — PROPOFOL 10 MG/ML
INJECTION, EMULSION INTRAVENOUS
Status: DISCONTINUED | OUTPATIENT
Start: 2025-03-19 | End: 2025-03-19 | Stop reason: SDUPTHER

## 2025-03-19 RX ADMIN — PROPOFOL 50 MG: 10 INJECTION, EMULSION INTRAVENOUS at 12:32

## 2025-03-19 RX ADMIN — PROPOFOL 50 MG: 10 INJECTION, EMULSION INTRAVENOUS at 12:38

## 2025-03-19 RX ADMIN — PROPOFOL 50 MG: 10 INJECTION, EMULSION INTRAVENOUS at 12:53

## 2025-03-19 RX ADMIN — PROPOFOL 50 MG: 10 INJECTION, EMULSION INTRAVENOUS at 12:42

## 2025-03-19 RX ADMIN — LIDOCAINE HYDROCHLORIDE 50 MG: 20 INJECTION, SOLUTION EPIDURAL; INFILTRATION; INTRACAUDAL; PERINEURAL at 12:21

## 2025-03-19 RX ADMIN — PROPOFOL 50 MG: 10 INJECTION, EMULSION INTRAVENOUS at 12:21

## 2025-03-19 RX ADMIN — PROPOFOL 50 MG: 10 INJECTION, EMULSION INTRAVENOUS at 12:49

## 2025-03-19 RX ADMIN — SODIUM CHLORIDE 25 ML: 0.9 INJECTION, SOLUTION INTRAVENOUS at 10:51

## 2025-03-19 RX ADMIN — PROPOFOL 50 MG: 10 INJECTION, EMULSION INTRAVENOUS at 12:27

## 2025-03-19 ASSESSMENT — PAIN - FUNCTIONAL ASSESSMENT
PAIN_FUNCTIONAL_ASSESSMENT: NONE - DENIES PAIN
PAIN_FUNCTIONAL_ASSESSMENT: 0-10
PAIN_FUNCTIONAL_ASSESSMENT: NONE - DENIES PAIN
PAIN_FUNCTIONAL_ASSESSMENT: NONE - DENIES PAIN

## 2025-03-19 NOTE — ANESTHESIA POSTPROCEDURE EVALUATION
Department of Anesthesiology  Postprocedure Note    Patient: Jimena Sepulveda  MRN: [id number]  YOB: 1955  Date of evaluation: 3/19/2025    Procedure Summary       Date: 03/19/25 Room / Location: [facility] ENDO 01 / [facility] ENDOSCOPY    Anesthesia Start: 1210 Anesthesia Stop: 1312    Procedure: COLONOSCOPY (Anus) Diagnosis:       Abnormal feces      Iron deficiency anemia secondary to blood loss (chronic)      Hx of colonic polyps      (Abnormal feces [R19.5])      (Iron deficiency anemia secondary to blood loss (chronic) [D50.0])      (Hx of colonic polyps [Z86.0100])    Surgeons: Jh Pelayo Jr., MD Responsible Provider: Margarita Summers APRN - CRNA    Anesthesia Type: TIVA ASA Status: 3            Anesthesia Type: TIVA    Sowmya Phase I: Sowmya Score: 10    Sowmya Phase II: Sowmya Score: 10    Anesthesia Post Evaluation    Patient location during evaluation: bedside  Patient participation: complete - patient participated  Level of consciousness: awake and alert  Pain score: 0  Airway patency: patent  Nausea & Vomiting: no nausea  Cardiovascular status: hemodynamically stable  Respiratory status: acceptable  Hydration status: euvolemic  Pain management: adequate    No notable events documented.

## 2025-03-19 NOTE — DISCHARGE INSTRUCTIONS
For the next 12 hours you should not:   1. drive   2. drink alcohol   3. operate any machinery   4. engage in activities that require mental sharpness or manual dexterity such as     cooking   5. take any drugs other than those prescribed by a physician   6. make any legal or financial decisions    Call your doctor's office immediately, if there is is anything unusual:   1. increased and continuing rectal bleeding   2. fever   3. Unusual abdominal pain    Take it easy today and resume normal activity tomorrow.It is common to have gas and mild bloating for a few hours. Pain is NOT normal. You may be groggy off and on for a few hours.    Resume previous diet.        Jh Pelayo Jr, MD  3/19/2025  1:16 PM

## 2025-03-19 NOTE — ANESTHESIA PRE PROCEDURE
Department of Anesthesiology  Preprocedure Note       Name:  Jimena Sepulveda   Age:  70 y.o.  :  1955                                          MRN:  [id number]         Date:  3/19/2025      Surgeon: Surgeon(s):  Jh Pelayo Jr., MD    Procedure: Procedure(s):  COLONOSCOPY    Medications prior to admission:   Prior to Admission medications    Medication Sig Start Date End Date Taking? Authorizing Provider   acetaminophen (TYLENOL) 325 MG tablet Take 2 tablets by mouth every 6 hours as needed for Pain   Yes Meenakshi Starks MD   polyethylene glycol (GLYCOLAX) 17 GM/SCOOP powder Use as directed by physician. 25   Jh Pelayo Jr., MD   pantoprazole (PROTONIX) 40 MG tablet Take 1 tablet by mouth in the morning and at bedtime 24   Eugene Hammond PA-C   sucralfate (CARAFATE) 1 GM tablet Take 1 tablet by mouth 4 times daily 24   Eugene Hammond PA-C   ferrous sulfate (IRON 325) 325 (65 Fe) MG tablet Take 1 tablet by mouth 2 times daily 24   Eugene Hammond PA-C   divalproex (DEPAKOTE SPRINKLE) 125 MG DR capsule Take 1 capsule by mouth 2 times daily    Meenakshi Starks MD   QUEtiapine (SEROQUEL) 100 MG tablet Take 1.5 tablets by mouth at bedtime    Meenakshi Starks MD   sertraline (ZOLOFT) 100 MG tablet Take 2 tablets by mouth daily    Meenakshi Starks MD   donepezil (ARICEPT) 10 MG tablet TAKE 1 TABLET BY MOUTH EVERYDAY AT BEDTIME 3/20/23   Meenakshi Starks MD   levothyroxine (SYNTHROID) 150 MCG tablet Take 125 mcg by mouth every morning (before breakfast) 4/3/23   Meenakshi Starks MD   metoprolol tartrate (LOPRESSOR) 25 MG tablet Take 1 tablet by mouth 2 times daily 23   Meenakshi Starks MD   atorvastatin (LIPITOR) 40 MG tablet Take 1 tablet by mouth daily    Meenakshi Starks MD       Current medications:    Current Facility-Administered Medications   Medication Dose Route Frequency Provider Last Rate Last Admin   • 0.9 %

## 2025-03-19 NOTE — OP NOTE
Colonoscopy Procedure Note      Patient: Jimena Sepulveda MRN: [id number]  SSN: [id number]    YOB: 1955  Age: 70 y.o.  Sex: female      Date of Procedure: 3/19/2025  Date/Time:  3/19/2025 1:11 PM       IMPRESSION:     1.  Ascending colon polyp   2.  Left-sided diverticulosis         RECOMMENDATIONS:     1) Check biopsy results.  2) Await pathology report. Call me in 2 weeks if you have not received any information from my office regarding your results.  3) Repeat colonoscopy in 2 to 3 years or as determined by the pathology report.       INDICATION: Heme positive stools, iron deficiency anemia, history of colon polyps    PROCEDURE PERFORMED: Colonoscopy with cold biopsy     DESCRIPTION OF PROCEDURE: An informed consent was obtained.  The patient was placed in left lateral position.  Perianal inspection and a digital rectal exam was performed.  Video colonoscope was introduced into the rectum and advanced under direct vision up to the terminal ileum.  With adequate insufflation and maneuvering of the withdrawing scope, the colonic mucosa was visualized carefully.  Retroflexion was performed in the rectum to see the anorectum and also in the ascending colon to look behind the folds.  Vital signs, pulse oximetry, single lead cardiac monitor were monitored throughout the procedure as the sedation was titrated to the desired effect ensuring patient comfort and safety.  The patient tolerated the procedure very well and was transferred to the recovery area. Following is the summary of findings: In the ascending colon was a polyp measuring 0.5 cm that was removed via multiple cold biopsies.  Patient had diverticulosis involving the sigmoid and descending colon which was mild to moderate in number and size.  Patient had dark liquid stool that was noted prior related to her iron therapy.  Much which could be evacuated.        ENDOSCOPIST: Jh Pelayo Jr, MD     ENDOSCOPE: [employer]

## 2025-03-19 NOTE — INTERVAL H&P NOTE
Update History & Physical    The patient's History and Physical of March 19, 2025 was reviewed with the patient and I examined the patient. There was no change. The surgical site was confirmed by the patient and me.     Plan: The risks, benefits, expected outcome, and alternative to the recommended procedure have been discussed with the patient. Patient understands and wants to proceed with the procedure.     Electronically signed by Jh Pelayo Jr, MD on 3/19/2025 at 10:08 AM

## 2025-03-19 NOTE — PROGRESS NOTES
Patient states \" can feel when peed & I have peed since returning from procedure\" spouse at bedside, clean brief applied, no complaints voiced no distress noted.

## 2025-03-19 NOTE — PROGRESS NOTES
[facility] [employer] employees arrived for transport back to facility, [place] papers provided to staff, patient with no complaints, no distress noted.   Discharged back to facility by [facility]

## 2025-03-27 ENCOUNTER — RESULTS FOLLOW-UP (OUTPATIENT)
Age: 70
End: 2025-03-27

## 2025-06-28 ENCOUNTER — APPOINTMENT (OUTPATIENT)
Facility: HOSPITAL | Age: 70
End: 2025-06-28
Payer: MEDICARE

## 2025-06-28 ENCOUNTER — HOSPITAL ENCOUNTER (EMERGENCY)
Facility: HOSPITAL | Age: 70
Discharge: HOME | End: 2025-06-28
Attending: EMERGENCY MEDICINE
Payer: MEDICARE

## 2025-06-28 VITALS
TEMPERATURE: 97.7 F | HEART RATE: 74 BPM | HEIGHT: 66 IN | WEIGHT: 190.3 LBS | DIASTOLIC BLOOD PRESSURE: 98 MMHG | BODY MASS INDEX: 30.58 KG/M2 | OXYGEN SATURATION: 97 % | RESPIRATION RATE: 16 BRPM | SYSTOLIC BLOOD PRESSURE: 121 MMHG

## 2025-06-28 DIAGNOSIS — J44.1 COPD EXACERBATION (HCC): Primary | ICD-10-CM

## 2025-06-28 LAB
ALBUMIN SERPL-MCNC: 3.4 G/DL (ref 3.5–5)
ALBUMIN/GLOB SERPL: 1.1 (ref 1.1–2.2)
ALP SERPL-CCNC: 89 U/L (ref 45–117)
ALT SERPL-CCNC: 30 U/L (ref 12–78)
ANION GAP SERPL CALC-SCNC: 7 MMOL/L (ref 2–12)
AST SERPL W P-5'-P-CCNC: 26 U/L (ref 15–37)
BASOPHILS # BLD: 0.05 K/UL (ref 0–0.1)
BASOPHILS NFR BLD: 0.7 % (ref 0–1)
BILIRUB SERPL-MCNC: 0.2 MG/DL (ref 0.2–1)
BNP SERPL-MCNC: 229 PG/ML
BUN SERPL-MCNC: 16 MG/DL (ref 6–20)
BUN/CREAT SERPL: 14 (ref 12–20)
CA-I BLD-MCNC: 9.1 MG/DL (ref 8.5–10.1)
CHLORIDE SERPL-SCNC: 106 MMOL/L (ref 97–108)
CO2 SERPL-SCNC: 30 MMOL/L (ref 21–32)
CREAT SERPL-MCNC: 1.12 MG/DL (ref 0.55–1.02)
DIFFERENTIAL METHOD BLD: ABNORMAL
EKG ATRIAL RATE: 71 BPM
EKG DIAGNOSIS: NORMAL
EKG P AXIS: 79 DEGREES
EKG P-R INTERVAL: 179 MS
EKG Q-T INTERVAL: 395 MS
EKG QRS DURATION: 86 MS
EKG QTC CALCULATION (BAZETT): 430 MS
EKG R AXIS: 25 DEGREES
EKG T AXIS: 74 DEGREES
EKG VENTRICULAR RATE: 71 BPM
EOSINOPHIL # BLD: 0.24 K/UL (ref 0–0.4)
EOSINOPHIL NFR BLD: 3.3 % (ref 0–7)
ERYTHROCYTE [DISTWIDTH] IN BLOOD BY AUTOMATED COUNT: 13.2 % (ref 11.5–14.5)
GLOBULIN SER CALC-MCNC: 3 G/DL (ref 2–4)
GLUCOSE SERPL-MCNC: 165 MG/DL (ref 65–100)
HCT VFR BLD AUTO: 32.6 % (ref 35–47)
HGB BLD-MCNC: 10.6 G/DL (ref 11.5–16)
IMM GRANULOCYTES # BLD AUTO: 0.03 K/UL (ref 0–0.04)
IMM GRANULOCYTES NFR BLD AUTO: 0.4 % (ref 0–0.5)
LYMPHOCYTES # BLD: 0.78 K/UL (ref 0.8–3.5)
LYMPHOCYTES NFR BLD: 10.7 % (ref 12–49)
MAGNESIUM SERPL-MCNC: 1.7 MG/DL (ref 1.6–2.4)
MCH RBC QN AUTO: 31.7 PG (ref 26–34)
MCHC RBC AUTO-ENTMCNC: 32.5 G/DL (ref 30–36.5)
MCV RBC AUTO: 97.6 FL (ref 80–99)
MONOCYTES # BLD: 0.53 K/UL (ref 0–1)
MONOCYTES NFR BLD: 7.3 % (ref 5–13)
NEUTS SEG # BLD: 5.67 K/UL (ref 1.8–8)
NEUTS SEG NFR BLD: 77.6 % (ref 32–75)
NRBC # BLD: 0 K/UL (ref 0–0.01)
NRBC BLD-RTO: 0 PER 100 WBC
PLATELET # BLD AUTO: 172 K/UL (ref 150–400)
PMV BLD AUTO: 10.1 FL (ref 8.9–12.9)
POTASSIUM SERPL-SCNC: 4.1 MMOL/L (ref 3.5–5.1)
PROT SERPL-MCNC: 6.4 G/DL (ref 6.4–8.2)
RBC # BLD AUTO: 3.34 M/UL (ref 3.8–5.2)
RBC MORPH BLD: ABNORMAL
SODIUM SERPL-SCNC: 143 MMOL/L (ref 136–145)
TROPONIN I SERPL HS-MCNC: 6 NG/L (ref 0–51)
WBC # BLD AUTO: 7.3 K/UL (ref 3.6–11)

## 2025-06-28 PROCEDURE — 99285 EMERGENCY DEPT VISIT HI MDM: CPT

## 2025-06-28 PROCEDURE — 94762 N-INVAS EAR/PLS OXIMTRY CONT: CPT

## 2025-06-28 PROCEDURE — 94640 AIRWAY INHALATION TREATMENT: CPT

## 2025-06-28 PROCEDURE — 36415 COLL VENOUS BLD VENIPUNCTURE: CPT

## 2025-06-28 PROCEDURE — 85025 COMPLETE CBC W/AUTO DIFF WBC: CPT

## 2025-06-28 PROCEDURE — 6370000000 HC RX 637 (ALT 250 FOR IP): Performed by: EMERGENCY MEDICINE

## 2025-06-28 PROCEDURE — 71275 CT ANGIOGRAPHY CHEST: CPT

## 2025-06-28 PROCEDURE — 6360000004 HC RX CONTRAST MEDICATION: Performed by: EMERGENCY MEDICINE

## 2025-06-28 PROCEDURE — 83735 ASSAY OF MAGNESIUM: CPT

## 2025-06-28 PROCEDURE — 96374 THER/PROPH/DIAG INJ IV PUSH: CPT

## 2025-06-28 PROCEDURE — 84484 ASSAY OF TROPONIN QUANT: CPT

## 2025-06-28 PROCEDURE — 80053 COMPREHEN METABOLIC PANEL: CPT

## 2025-06-28 PROCEDURE — 2500000003 HC RX 250 WO HCPCS: Performed by: EMERGENCY MEDICINE

## 2025-06-28 PROCEDURE — 83880 ASSAY OF NATRIURETIC PEPTIDE: CPT

## 2025-06-28 PROCEDURE — 93005 ELECTROCARDIOGRAM TRACING: CPT | Performed by: EMERGENCY MEDICINE

## 2025-06-28 PROCEDURE — 6360000002 HC RX W HCPCS: Performed by: EMERGENCY MEDICINE

## 2025-06-28 RX ORDER — ALBUTEROL SULFATE 90 UG/1
2 INHALANT RESPIRATORY (INHALATION) 4 TIMES DAILY PRN
Qty: 54 G | Refills: 1 | Status: SHIPPED | OUTPATIENT
Start: 2025-06-28

## 2025-06-28 RX ORDER — PREDNISONE 20 MG/1
40 TABLET ORAL ONCE
Status: DISCONTINUED | OUTPATIENT
Start: 2025-06-28 | End: 2025-06-28

## 2025-06-28 RX ORDER — PREDNISONE 20 MG/1
40 TABLET ORAL DAILY
Qty: 10 TABLET | Refills: 0 | Status: SHIPPED | OUTPATIENT
Start: 2025-06-28 | End: 2025-07-03

## 2025-06-28 RX ORDER — IPRATROPIUM BROMIDE AND ALBUTEROL SULFATE 2.5; .5 MG/3ML; MG/3ML
1 SOLUTION RESPIRATORY (INHALATION)
Status: COMPLETED | OUTPATIENT
Start: 2025-06-28 | End: 2025-06-28

## 2025-06-28 RX ORDER — IOPAMIDOL 755 MG/ML
100 INJECTION, SOLUTION INTRAVASCULAR
Status: COMPLETED | OUTPATIENT
Start: 2025-06-28 | End: 2025-06-28

## 2025-06-28 RX ORDER — AZITHROMYCIN 250 MG/1
TABLET, FILM COATED ORAL
Qty: 6 TABLET | Refills: 0 | Status: SHIPPED | OUTPATIENT
Start: 2025-06-28 | End: 2025-07-08

## 2025-06-28 RX ORDER — AZITHROMYCIN 250 MG/1
500 TABLET, FILM COATED ORAL
Status: COMPLETED | OUTPATIENT
Start: 2025-06-28 | End: 2025-06-28

## 2025-06-28 RX ADMIN — IOPAMIDOL 100 ML: 755 INJECTION, SOLUTION INTRAVENOUS at 14:11

## 2025-06-28 RX ADMIN — IPRATROPIUM BROMIDE AND ALBUTEROL SULFATE 1 DOSE: .5; 3 SOLUTION RESPIRATORY (INHALATION) at 13:34

## 2025-06-28 RX ADMIN — WATER 40 MG: 1 INJECTION INTRAMUSCULAR; INTRAVENOUS; SUBCUTANEOUS at 13:47

## 2025-06-28 RX ADMIN — AZITHROMYCIN DIHYDRATE 500 MG: 250 TABLET ORAL at 13:56

## 2025-06-28 ASSESSMENT — PAIN SCALES - GENERAL: PAINLEVEL_OUTOF10: 0

## 2025-06-28 ASSESSMENT — PAIN - FUNCTIONAL ASSESSMENT
PAIN_FUNCTIONAL_ASSESSMENT: 0-10
PAIN_FUNCTIONAL_ASSESSMENT: ACTIVITIES ARE NOT PREVENTED

## 2025-06-28 NOTE — DISCHARGE INSTRUCTIONS
You were seen in the ER for your episode of difficulty breathing.  Thankfully, we are able to rule out dangerous causes like a heart attack, pneumonia, collapsed lung, blood clot in your lungs, or heart failure.    You do have episodes where you cough and this causes some slight wheezing, so we are treating this as a potential COPD exacerbation.  We are giving you some steroids to reduce inflammation in your lung, and an antibiotic to treat this.  You can use an albuterol inhaler as needed.  Return to emergency room for any new or worsening difficulty breathing, or any other new or concerning symptoms.          Thank you for choosing our Emergency Department for your care.  It is our privilege to care for you in your time of need.  In the next several days, you may receive a survey via email or mailed to your home about your experience with our team.  We would greatly appreciate you taking a few minutes to complete the survey, as we use this information to learn what we have done well and what we could be doing better. Thank you for trusting us with your care!    Below you will find a list of your tests from today's visit.   Labs and Radiology Studies  Recent Results (from the past 12 hours)   EKG 12 Lead    Collection Time: 06/28/25  1:04 PM   Result Value Ref Range    Ventricular Rate 71 BPM    Atrial Rate 71 BPM    P-R Interval 179 ms    QRS Duration 86 ms    Q-T Interval 395 ms    QTc Calculation (Bazett) 430 ms    P Axis 79 degrees    R Axis 25 degrees    T Axis 74 degrees    Diagnosis Sinus rhythm  Artifact in lead(s) II,III,aVR,aVF      CBC with Auto Differential    Collection Time: 06/28/25  1:15 PM   Result Value Ref Range    WBC 7.3 3.6 - 11.0 K/uL    RBC 3.34 (L) 3.80 - 5.20 M/uL    Hemoglobin 10.6 (L) 11.5 - 16.0 g/dL    Hematocrit 32.6 (L) 35.0 - 47.0 %    MCV 97.6 80.0 - 99.0 FL    MCH 31.7 26.0 - 34.0 PG    MCHC 32.5 30.0 - 36.5 g/dL    RDW 13.2 11.5 - 14.5 %    Platelets 172 150 - 400 K/uL    MPV

## 2025-06-28 NOTE — ED PROVIDER NOTES
[facility] EMERGENCY DEPT  EMERGENCY DEPARTMENT HISTORY AND PHYSICAL EXAM      Date: 6/28/2025  Patient Name: Jimena Sepulveda  MRN: [id number]  Birthdate 1955  Date of evaluation: 6/28/2025  Provider: Joan Peña MD   Note Started: 1:18 PM EDT 6/28/25    HISTORY OF PRESENT ILLNESS     Chief Complaint   Patient presents with    Shortness of Breath       History Provided By: patient, EMS    HPI: Jimena Sepulveda is a 70 y.o. female with PMH COPD presenting with SOB. Onset 1hr ago, states she does not feel SOB now, however began feeling SOB again when respiratory exam begun. Denies CP, F/C/N/V/D, dysuria but has had a cough a few days. Was sitting comfortably when it began all of a sudden. Not on AC.     PAST MEDICAL HISTORY   Past Medical History:  Past Medical History:   Diagnosis Date    Adjustment disorder with mixed disturbance of emotions and conduct     Anemia     Blind     Blood in stool 06/02/2023    Calculus of kidney 03/24/2024    Calculus of ureter 03/21/2024    Cerebral infarction (HCC)     Chronic obstructive pulmonary disease (HCC)     Chronic respiratory failure with hypoxia (HCC)     Coagulation defect     Cognitive communication deficit     Dementia (HCC)     Depression     Diarrhea     History of colonic polyps 03/08/2023    [facility]    Hypertension     Lower abdominal pain     Muscle weakness     Stroke (HCC) 2018    Thyroid disorder     UTI (urinary tract infection) 03/06/2024    Visual loss        Past Surgical History:  Past Surgical History:   Procedure Laterality Date    COLONOSCOPY N/A     march 8, 2023 at [facility]   colon polyps    COLONOSCOPY N/A 06/08/2023    COLONOSCOPY WITH HOT SNARE POLYPECTOMY  performed by Jh Pelayo Jr., MD at [facility] ENDOSCOPY    COLONOSCOPY N/A 3/19/2025    COLONOSCOPY performed by Jh Pelayo Jr., MD at [facility] ENDOSCOPY    CYSTOSCOPY Right 03/17/2024    CYSTOSCOPY, RIGHT DOUBLE J STENT PLACEMENT performed by Doc Diaz MD at [facility] MAIN OR    CYSTOSCOPY

## 2025-06-28 NOTE — ED TRIAGE NOTES
Patient presents for shortness of breath and congestive cough via [facility] from [facility].  PMH dementia.

## 2025-06-29 NOTE — ED NOTES
Patient discharged and [facility]  Patient stable at time of transfer.

## 2025-07-01 LAB
EKG ATRIAL RATE: 71 BPM
EKG DIAGNOSIS: NORMAL
EKG P AXIS: 79 DEGREES
EKG P-R INTERVAL: 179 MS
EKG Q-T INTERVAL: 395 MS
EKG QRS DURATION: 86 MS
EKG QTC CALCULATION (BAZETT): 430 MS
EKG R AXIS: 25 DEGREES
EKG T AXIS: 74 DEGREES
EKG VENTRICULAR RATE: 71 BPM

## (undated) DEVICE — LINE SAMPLING ADVANCE ORAL NASAL MICROSTREAM O2 TUBING 6.5'

## (undated) DEVICE — GLOVE ORANGE PI 7 1/2   MSG9075

## (undated) DEVICE — TUBING, SUCTION, 9/32" X 10', STRAIGHT: Brand: MEDLINE

## (undated) DEVICE — SOLUTION IRRIG 1000ML STRL H2O USP PLAS POUR BTL

## (undated) DEVICE — RADIFOCUS GLIDEWIRE: Brand: GLIDEWIRE

## (undated) DEVICE — SYRINGE,TOOMEY,IRRIGATION,70CC,STERILE: Brand: MEDLINE

## (undated) DEVICE — JELLY,LUBE,STERILE,FLIP TOP,TUBE,4-OZ: Brand: MEDLINE

## (undated) DEVICE — FORCEPS BX L240CM JAW DIA2.4MM ORNG L CAP W/ NDL DISP RAD

## (undated) DEVICE — CATHETER ETER URET 5FR L70CM 0038IN FLX OPN TIP NO SIDE EYE

## (undated) DEVICE — NCIRCLE, NITINOL TIPLESS STONE EXTRACTOR MODIFIED BASKET: Brand: NCIRCLE

## (undated) DEVICE — PREP PAD BNS: Brand: CONVERTORS

## (undated) DEVICE — GLOVE SURG SZ 65 THK91MIL LTX FREE SYN POLYISOPRENE

## (undated) DEVICE — 200 MICRON SINGLE USE FIBER ASSEMBLY WITH FLAT TIP

## (undated) DEVICE — SPONGE GZ W4XL4IN COT 12 PLY TYP VII WVN C FLD DSGN STERILE

## (undated) DEVICE — SOLUTION IRRIG 1000ML H2O PIC PLAS SHATTERPROOF CONTAINER

## (undated) DEVICE — ELECTRODE PT RET AD L9FT HI MOIST COND ADH HYDRGEL CORDED

## (undated) DEVICE — CYSTO PACK: Brand: MEDLINE INDUSTRIES, INC.

## (undated) DEVICE — SOLUTION SCRB 4OZ 4% CHG H2O AIDED FOR PREOPERATIVE SKIN

## (undated) DEVICE — DRAPE EQUIP C ARM 74X42 IN MOB XR W/ TIE RUBBER BND LF

## (undated) DEVICE — PAD,PREPPING,CUFFED,24X48,7",NONSTERILE: Brand: MEDLINE

## (undated) DEVICE — TUBING, SUCTION, 1/4" X 12', STRAIGHT: Brand: MEDLINE

## (undated) DEVICE — SOLUTION IRRIG 3000ML 0.9% SOD CHL USP UROMATIC PLAS CONT

## (undated) DEVICE — KIT ENDOSCOPIC  PROC VIA

## (undated) DEVICE — 1LYRTR 16FR10ML100%SIL UMS SNP: Brand: MEDLINE INDUSTRIES, INC.

## (undated) DEVICE — 1200CC GUARDIAN II: Brand: GUARDIAN

## (undated) DEVICE — CANNULA NSL O2 AD 7 FT END-TIDAL CARBON DIOX VENTFLO

## (undated) DEVICE — GARMENT,MEDLINE,DVT,INT,CALF,MED, GEN2: Brand: MEDLINE

## (undated) DEVICE — SEAL INSTR PRT SELF SEAL

## (undated) DEVICE — BAG,DRAINAGE,ANTI-REFLUX TOWER,2000ML: Brand: MEDLINE

## (undated) DEVICE — SINGLE-USE POLYPECTOMY SNARE: Brand: CAPTIFLEX

## (undated) DEVICE — SOLUTION IRRIG 500ML STRL H2O NONPYROGENIC

## (undated) DEVICE — NAKAO SPIDER-NET RETRIEVAL DEVICE 230 X 2.5 X 5.5 CM: Brand: NAKAO